# Patient Record
Sex: MALE | Race: WHITE | NOT HISPANIC OR LATINO | Employment: OTHER | URBAN - METROPOLITAN AREA
[De-identification: names, ages, dates, MRNs, and addresses within clinical notes are randomized per-mention and may not be internally consistent; named-entity substitution may affect disease eponyms.]

---

## 2017-06-12 ENCOUNTER — HISTORICAL (OUTPATIENT)
Dept: ADMINISTRATIVE | Facility: HOSPITAL | Age: 72
End: 2017-06-12

## 2017-06-12 LAB
CHOLEST SERPL-MCNC: 213 MG/DL (ref 0–200)
CHOLEST/HDLC SERPL: 3.4 {RATIO} (ref 0–5)
HDLC SERPL-MCNC: 62 MG/DL (ref 35–60)
LDLC SERPL CALC-MCNC: 143 MG/DL (ref 0–129)
PSA SERPL-MCNC: 1.09 NG/ML (ref 0–4)
TRIGL SERPL-MCNC: 42 MG/DL (ref 30–150)
VLDLC SERPL CALC-MCNC: 8 MG/DL

## 2018-05-01 ENCOUNTER — HISTORICAL (OUTPATIENT)
Dept: LAB | Facility: HOSPITAL | Age: 73
End: 2018-05-01

## 2018-05-01 LAB
ABS NEUT (OLG): 4.55 X10(3)/MCL (ref 2.1–9.2)
ALBUMIN SERPL-MCNC: 3.9 GM/DL (ref 3.4–5)
ALBUMIN/GLOB SERPL: 1.2 RATIO (ref 1.1–2)
ALP SERPL-CCNC: 89 UNIT/L (ref 50–136)
ALT SERPL-CCNC: 25 UNIT/L (ref 12–78)
APPEARANCE, UA: CLEAR
AST SERPL-CCNC: 14 UNIT/L (ref 15–37)
BACTERIA SPEC CULT: NORMAL /HPF
BASOPHILS # BLD AUTO: 0 X10(3)/MCL (ref 0–0.2)
BASOPHILS NFR BLD AUTO: 0 %
BILIRUB SERPL-MCNC: 0.8 MG/DL (ref 0.2–1)
BILIRUB UR QL STRIP: NEGATIVE
BILIRUBIN DIRECT+TOT PNL SERPL-MCNC: 0.1 MG/DL (ref 0–0.5)
BILIRUBIN DIRECT+TOT PNL SERPL-MCNC: 0.7 MG/DL (ref 0–0.8)
BUN SERPL-MCNC: 20 MG/DL (ref 7–18)
CALCIUM SERPL-MCNC: 8.7 MG/DL (ref 8.5–10.1)
CHLORIDE SERPL-SCNC: 108 MMOL/L (ref 98–107)
CHOLEST SERPL-MCNC: 228 MG/DL (ref 0–200)
CHOLEST/HDLC SERPL: 3.8 {RATIO} (ref 0–5)
CO2 SERPL-SCNC: 30 MMOL/L (ref 21–32)
COLOR UR: YELLOW
CREAT SERPL-MCNC: 0.95 MG/DL (ref 0.7–1.3)
EOSINOPHIL # BLD AUTO: 0.1 X10(3)/MCL (ref 0–0.9)
EOSINOPHIL NFR BLD AUTO: 2 %
ERYTHROCYTE [DISTWIDTH] IN BLOOD BY AUTOMATED COUNT: 13.2 % (ref 11.5–17)
GLOBULIN SER-MCNC: 3.2 GM/DL (ref 2.4–3.5)
GLUCOSE (UA): NEGATIVE
GLUCOSE SERPL-MCNC: 90 MG/DL (ref 74–106)
HCT VFR BLD AUTO: 45.2 % (ref 42–52)
HDLC SERPL-MCNC: 60 MG/DL (ref 35–60)
HGB BLD-MCNC: 14.7 GM/DL (ref 14–18)
HGB UR QL STRIP: NEGATIVE
KETONES UR QL STRIP: NEGATIVE
LDLC SERPL CALC-MCNC: 155 MG/DL (ref 0–129)
LEUKOCYTE ESTERASE UR QL STRIP: NEGATIVE
LYMPHOCYTES # BLD AUTO: 2.1 X10(3)/MCL (ref 0.6–4.6)
LYMPHOCYTES NFR BLD AUTO: 28 %
MCH RBC QN AUTO: 31.5 PG (ref 27–31)
MCHC RBC AUTO-ENTMCNC: 32.5 GM/DL (ref 33–36)
MCV RBC AUTO: 96.8 FL (ref 80–94)
MONOCYTES # BLD AUTO: 0.6 X10(3)/MCL (ref 0.1–1.3)
MONOCYTES NFR BLD AUTO: 8 %
NEUTROPHILS # BLD AUTO: 4.55 X10(3)/MCL (ref 1.4–7.9)
NEUTROPHILS NFR BLD AUTO: 61 %
NITRITE UR QL STRIP: NEGATIVE
PH UR STRIP: 6.5 [PH] (ref 5–9)
PLATELET # BLD AUTO: 228 X10(3)/MCL (ref 130–400)
PMV BLD AUTO: 9.9 FL (ref 9.4–12.4)
POTASSIUM SERPL-SCNC: 3.7 MMOL/L (ref 3.5–5.1)
PROT SERPL-MCNC: 7.1 GM/DL (ref 6.4–8.2)
PROT UR QL STRIP: NEGATIVE
PSA SERPL-MCNC: 0.68 NG/ML (ref 0–4)
RBC # BLD AUTO: 4.67 X10(6)/MCL (ref 4.7–6.1)
RBC #/AREA URNS HPF: NORMAL /[HPF]
SODIUM SERPL-SCNC: 142 MMOL/L (ref 136–145)
SP GR UR STRIP: 1.01 (ref 1–1.03)
SQUAMOUS EPITHELIAL, UA: NORMAL
TRIGL SERPL-MCNC: 63 MG/DL (ref 30–150)
UROBILINOGEN UR STRIP-ACNC: 0.2
VLDLC SERPL CALC-MCNC: 13 MG/DL
WBC # SPEC AUTO: 7.4 X10(3)/MCL (ref 4.5–11.5)
WBC #/AREA URNS HPF: NORMAL /HPF

## 2018-11-07 ENCOUNTER — HISTORICAL (OUTPATIENT)
Dept: LAB | Facility: HOSPITAL | Age: 73
End: 2018-11-07

## 2018-11-07 LAB
BUN SERPL-MCNC: 18 MG/DL (ref 7–18)
CALCIUM SERPL-MCNC: 8.8 MG/DL (ref 8.5–10.1)
CHLORIDE SERPL-SCNC: 109 MMOL/L (ref 98–107)
CO2 SERPL-SCNC: 29 MMOL/L (ref 21–32)
CREAT SERPL-MCNC: 0.84 MG/DL (ref 0.7–1.3)
CREAT/UREA NIT SERPL: 21.4
GLUCOSE SERPL-MCNC: 90 MG/DL (ref 74–106)
POTASSIUM SERPL-SCNC: 4.2 MMOL/L (ref 3.5–5.1)
SODIUM SERPL-SCNC: 142 MMOL/L (ref 136–145)

## 2018-11-08 ENCOUNTER — HISTORICAL (OUTPATIENT)
Dept: CARDIOLOGY | Facility: HOSPITAL | Age: 73
End: 2018-11-08

## 2019-05-13 ENCOUNTER — HISTORICAL (OUTPATIENT)
Dept: ADMINISTRATIVE | Facility: HOSPITAL | Age: 74
End: 2019-05-13

## 2019-05-13 LAB
ABS NEUT (OLG): 5.87 X10(3)/MCL (ref 2.1–9.2)
ALBUMIN SERPL-MCNC: 3.6 GM/DL (ref 3.4–5)
ALBUMIN/GLOB SERPL: 1.2 RATIO (ref 1.1–2)
ALP SERPL-CCNC: 89 UNIT/L (ref 50–136)
ALT SERPL-CCNC: 27 UNIT/L (ref 12–78)
APPEARANCE, UA: CLEAR
AST SERPL-CCNC: 19 UNIT/L (ref 15–37)
BACTERIA SPEC CULT: NORMAL /HPF
BASOPHILS # BLD AUTO: 0 X10(3)/MCL (ref 0–0.2)
BASOPHILS NFR BLD AUTO: 0 %
BILIRUB SERPL-MCNC: 0.5 MG/DL (ref 0.2–1)
BILIRUB UR QL STRIP: NEGATIVE
BILIRUBIN DIRECT+TOT PNL SERPL-MCNC: 0.1 MG/DL (ref 0–0.5)
BILIRUBIN DIRECT+TOT PNL SERPL-MCNC: 0.4 MG/DL (ref 0–0.8)
BUN SERPL-MCNC: 20 MG/DL (ref 7–18)
CALCIUM SERPL-MCNC: 8.9 MG/DL (ref 8.5–10.1)
CHLORIDE SERPL-SCNC: 109 MMOL/L (ref 98–107)
CHOLEST SERPL-MCNC: 197 MG/DL (ref 0–200)
CHOLEST/HDLC SERPL: 3.1 {RATIO} (ref 0–5)
CO2 SERPL-SCNC: 30 MMOL/L (ref 21–32)
COLOR UR: YELLOW
CREAT SERPL-MCNC: 0.88 MG/DL (ref 0.7–1.3)
DEPRECATED CALCIDIOL+CALCIFEROL SERPL-MC: 33.69 NG/ML (ref 30–80)
EOSINOPHIL # BLD AUTO: 0.1 X10(3)/MCL (ref 0–0.9)
EOSINOPHIL NFR BLD AUTO: 1 %
ERYTHROCYTE [DISTWIDTH] IN BLOOD BY AUTOMATED COUNT: 13.1 % (ref 11.5–17)
EST. AVERAGE GLUCOSE BLD GHB EST-MCNC: 120 MG/DL
GLOBULIN SER-MCNC: 3.1 GM/DL (ref 2.4–3.5)
GLUCOSE (UA): NEGATIVE
GLUCOSE SERPL-MCNC: 95 MG/DL (ref 74–106)
HBA1C MFR BLD: 5.8 % (ref 4.2–6.3)
HCT VFR BLD AUTO: 45.8 % (ref 42–52)
HDLC SERPL-MCNC: 64 MG/DL (ref 35–60)
HGB BLD-MCNC: 14.8 GM/DL (ref 14–18)
HGB UR QL STRIP: NEGATIVE
KETONES UR QL STRIP: NEGATIVE
LDLC SERPL CALC-MCNC: 124 MG/DL (ref 0–129)
LEUKOCYTE ESTERASE UR QL STRIP: NEGATIVE
LYMPHOCYTES # BLD AUTO: 1.2 X10(3)/MCL (ref 0.6–4.6)
LYMPHOCYTES NFR BLD AUTO: 16 %
MCH RBC QN AUTO: 30.7 PG (ref 27–31)
MCHC RBC AUTO-ENTMCNC: 32.3 GM/DL (ref 33–36)
MCV RBC AUTO: 95 FL (ref 80–94)
MONOCYTES # BLD AUTO: 0.5 X10(3)/MCL (ref 0.1–1.3)
MONOCYTES NFR BLD AUTO: 6 %
NEUTROPHILS # BLD AUTO: 5.87 X10(3)/MCL (ref 2.1–9.2)
NEUTROPHILS NFR BLD AUTO: 76 %
NITRITE UR QL STRIP: NEGATIVE
PH UR STRIP: 6.5 [PH] (ref 5–9)
PLATELET # BLD AUTO: 217 X10(3)/MCL (ref 130–400)
PMV BLD AUTO: 9.9 FL (ref 9.4–12.4)
POTASSIUM SERPL-SCNC: 4.5 MMOL/L (ref 3.5–5.1)
PROT SERPL-MCNC: 6.7 GM/DL (ref 6.4–8.2)
PROT UR QL STRIP: NEGATIVE
RBC # BLD AUTO: 4.82 X10(6)/MCL (ref 4.7–6.1)
RBC #/AREA URNS HPF: NORMAL /[HPF]
SODIUM SERPL-SCNC: 142 MMOL/L (ref 136–145)
SP GR UR STRIP: 1.01 (ref 1–1.03)
SQUAMOUS EPITHELIAL, UA: NORMAL
TRIGL SERPL-MCNC: 47 MG/DL (ref 30–150)
TSH SERPL-ACNC: 2.48 MIU/L (ref 0.36–3.74)
UROBILINOGEN UR STRIP-ACNC: 0.2
VLDLC SERPL CALC-MCNC: 9 MG/DL
WBC # SPEC AUTO: 7.7 X10(3)/MCL (ref 4.5–11.5)
WBC #/AREA URNS HPF: NORMAL /HPF

## 2019-11-14 ENCOUNTER — HISTORICAL (OUTPATIENT)
Dept: ADMINISTRATIVE | Facility: HOSPITAL | Age: 74
End: 2019-11-14

## 2019-11-14 LAB
BUN SERPL-MCNC: 23 MG/DL (ref 7–18)
CALCIUM SERPL-MCNC: 8.5 MG/DL (ref 8.5–10.1)
CHLORIDE SERPL-SCNC: 110 MMOL/L (ref 98–107)
CHOLEST SERPL-MCNC: 209 MG/DL (ref 0–200)
CHOLEST/HDLC SERPL: 3.5 {RATIO} (ref 0–5)
CO2 SERPL-SCNC: 30 MMOL/L (ref 21–32)
CREAT SERPL-MCNC: 0.9 MG/DL (ref 0.7–1.3)
CREAT/UREA NIT SERPL: 25.6
GLUCOSE SERPL-MCNC: 102 MG/DL (ref 74–106)
HDLC SERPL-MCNC: 60 MG/DL (ref 35–60)
LDLC SERPL CALC-MCNC: 140 MG/DL (ref 0–129)
POTASSIUM SERPL-SCNC: 3.9 MMOL/L (ref 3.5–5.1)
SODIUM SERPL-SCNC: 143 MMOL/L (ref 136–145)
TRIGL SERPL-MCNC: 45 MG/DL (ref 30–150)
VLDLC SERPL CALC-MCNC: 9 MG/DL

## 2020-07-16 ENCOUNTER — HISTORICAL (OUTPATIENT)
Dept: ADMINISTRATIVE | Facility: HOSPITAL | Age: 75
End: 2020-07-16

## 2020-07-16 LAB
ABS NEUT (OLG): 4.69 X10(3)/MCL (ref 2.1–9.2)
ALBUMIN SERPL-MCNC: 4 GM/DL (ref 3.4–4.8)
ALBUMIN/GLOB SERPL: 1.7 RATIO (ref 1.1–2)
ALP SERPL-CCNC: 76 UNIT/L (ref 40–150)
ALT SERPL-CCNC: 20 UNIT/L (ref 0–55)
APPEARANCE, UA: CLEAR
AST SERPL-CCNC: 21 UNIT/L (ref 5–34)
BACTERIA SPEC CULT: NORMAL /HPF
BASOPHILS # BLD AUTO: 0 X10(3)/MCL (ref 0–0.2)
BASOPHILS NFR BLD AUTO: 0 %
BILIRUB SERPL-MCNC: 0.6 MG/DL
BILIRUB UR QL STRIP: NEGATIVE
BILIRUBIN DIRECT+TOT PNL SERPL-MCNC: 0.2 MG/DL (ref 0–0.5)
BILIRUBIN DIRECT+TOT PNL SERPL-MCNC: 0.4 MG/DL (ref 0–0.8)
BUN SERPL-MCNC: 24.8 MG/DL (ref 8.4–25.7)
CALCIUM SERPL-MCNC: 8.9 MG/DL (ref 8.8–10)
CHLORIDE SERPL-SCNC: 106 MMOL/L (ref 98–107)
CHOLEST SERPL-MCNC: 218 MG/DL
CHOLEST/HDLC SERPL: 4 {RATIO} (ref 0–5)
CO2 SERPL-SCNC: 31 MMOL/L (ref 23–31)
COLOR UR: YELLOW
CREAT SERPL-MCNC: 0.85 MG/DL (ref 0.73–1.18)
DEPRECATED CALCIDIOL+CALCIFEROL SERPL-MC: 37.8 NG/ML (ref 6.6–49.9)
EOSINOPHIL # BLD AUTO: 0.1 X10(3)/MCL (ref 0–0.9)
EOSINOPHIL NFR BLD AUTO: 2 %
ERYTHROCYTE [DISTWIDTH] IN BLOOD BY AUTOMATED COUNT: 13.4 % (ref 11.5–17)
EST. AVERAGE GLUCOSE BLD GHB EST-MCNC: 105.4 MG/DL
GLOBULIN SER-MCNC: 2.4 GM/DL (ref 2.4–3.5)
GLUCOSE (UA): NEGATIVE
GLUCOSE SERPL-MCNC: 90 MG/DL (ref 82–115)
HBA1C MFR BLD: 5.3 %
HCT VFR BLD AUTO: 43.6 % (ref 42–52)
HDLC SERPL-MCNC: 56 MG/DL (ref 35–60)
HGB BLD-MCNC: 14.1 GM/DL (ref 14–18)
HGB UR QL STRIP: NEGATIVE
KETONES UR QL STRIP: NEGATIVE
LDLC SERPL CALC-MCNC: 152 MG/DL (ref 50–140)
LEUKOCYTE ESTERASE UR QL STRIP: NEGATIVE
LYMPHOCYTES # BLD AUTO: 1.2 X10(3)/MCL (ref 0.6–4.6)
LYMPHOCYTES NFR BLD AUTO: 18 %
MCH RBC QN AUTO: 30.9 PG (ref 27–31)
MCHC RBC AUTO-ENTMCNC: 32.3 GM/DL (ref 33–36)
MCV RBC AUTO: 95.4 FL (ref 80–94)
MONOCYTES # BLD AUTO: 0.5 X10(3)/MCL (ref 0.1–1.3)
MONOCYTES NFR BLD AUTO: 8 %
NEUTROPHILS # BLD AUTO: 4.69 X10(3)/MCL (ref 2.1–9.2)
NEUTROPHILS NFR BLD AUTO: 72 %
NITRITE UR QL STRIP: NEGATIVE
PH UR STRIP: 6.5 [PH] (ref 5–9)
PLATELET # BLD AUTO: 229 X10(3)/MCL (ref 130–400)
PMV BLD AUTO: 9.8 FL (ref 9.4–12.4)
POTASSIUM SERPL-SCNC: 4.6 MMOL/L (ref 3.5–5.1)
PROT SERPL-MCNC: 6.4 GM/DL (ref 5.8–7.6)
PROT UR QL STRIP: NEGATIVE
PSA SERPL-MCNC: 0.64 NG/ML
RBC # BLD AUTO: 4.57 X10(6)/MCL (ref 4.7–6.1)
RBC #/AREA URNS HPF: NORMAL /[HPF]
SODIUM SERPL-SCNC: 143 MMOL/L (ref 136–145)
SP GR UR STRIP: 1.01 (ref 1–1.03)
SQUAMOUS EPITHELIAL, UA: NORMAL
TRIGL SERPL-MCNC: 51 MG/DL (ref 34–140)
TSH SERPL-ACNC: 2.3 UIU/ML (ref 0.35–4.94)
UROBILINOGEN UR STRIP-ACNC: 0.2
VLDLC SERPL CALC-MCNC: 10 MG/DL
WBC # SPEC AUTO: 6.5 X10(3)/MCL (ref 4.5–11.5)
WBC #/AREA URNS HPF: NORMAL /HPF

## 2021-01-21 ENCOUNTER — HISTORICAL (OUTPATIENT)
Dept: ADMINISTRATIVE | Facility: HOSPITAL | Age: 76
End: 2021-01-21

## 2021-01-21 LAB
ALBUMIN SERPL-MCNC: 3.9 GM/DL (ref 3.4–4.8)
ALP SERPL-CCNC: 92 UNIT/L (ref 40–150)
ALT SERPL-CCNC: 18 UNIT/L (ref 0–55)
AST SERPL-CCNC: 19 UNIT/L (ref 5–34)
BILIRUB SERPL-MCNC: 0.4 MG/DL
BILIRUBIN DIRECT+TOT PNL SERPL-MCNC: 0.2 MG/DL (ref 0–0.5)
BILIRUBIN DIRECT+TOT PNL SERPL-MCNC: 0.2 MG/DL (ref 0–0.8)
BUN SERPL-MCNC: 27.5 MG/DL (ref 8.4–25.7)
CALCIUM SERPL-MCNC: 8.7 MG/DL (ref 8.8–10)
CHLORIDE SERPL-SCNC: 107 MMOL/L (ref 98–107)
CHOLEST SERPL-MCNC: 181 MG/DL
CHOLEST/HDLC SERPL: 3 {RATIO} (ref 0–5)
CO2 SERPL-SCNC: 28 MMOL/L (ref 23–31)
CREAT SERPL-MCNC: 0.87 MG/DL (ref 0.73–1.18)
CREAT/UREA NIT SERPL: 32
GLUCOSE SERPL-MCNC: 103 MG/DL (ref 82–115)
HDLC SERPL-MCNC: 53 MG/DL (ref 35–60)
LDLC SERPL CALC-MCNC: 123 MG/DL (ref 50–140)
LIVER PROFILE INTERP: NORMAL
POTASSIUM SERPL-SCNC: 4.8 MMOL/L (ref 3.5–5.1)
PROT SERPL-MCNC: 6.4 GM/DL (ref 5.8–7.6)
SODIUM SERPL-SCNC: 144 MMOL/L (ref 136–145)
TRIGL SERPL-MCNC: 27 MG/DL (ref 34–140)
VLDLC SERPL CALC-MCNC: 5 MG/DL

## 2021-04-30 ENCOUNTER — HISTORICAL (OUTPATIENT)
Dept: ADMINISTRATIVE | Facility: HOSPITAL | Age: 76
End: 2021-04-30

## 2021-04-30 LAB
ABS NEUT (OLG): 4.78 X10(3)/MCL (ref 2.1–9.2)
BASOPHILS # BLD AUTO: 0 X10(3)/MCL (ref 0–0.2)
BASOPHILS NFR BLD AUTO: 1 %
EOSINOPHIL # BLD AUTO: 0.1 X10(3)/MCL (ref 0–0.9)
EOSINOPHIL NFR BLD AUTO: 1 %
ERYTHROCYTE [DISTWIDTH] IN BLOOD BY AUTOMATED COUNT: 13.9 % (ref 11.5–17)
HCT VFR BLD AUTO: 43.5 % (ref 42–52)
HGB BLD-MCNC: 13.9 GM/DL (ref 14–18)
LYMPHOCYTES # BLD AUTO: 1.4 X10(3)/MCL (ref 0.6–4.6)
LYMPHOCYTES NFR BLD AUTO: 21 %
MCH RBC QN AUTO: 30.6 PG (ref 27–31)
MCHC RBC AUTO-ENTMCNC: 32 GM/DL (ref 33–36)
MCV RBC AUTO: 95.8 FL (ref 80–94)
MONOCYTES # BLD AUTO: 0.5 X10(3)/MCL (ref 0.1–1.3)
MONOCYTES NFR BLD AUTO: 7 %
NEUTROPHILS # BLD AUTO: 4.78 X10(3)/MCL (ref 2.1–9.2)
NEUTROPHILS NFR BLD AUTO: 70 %
PLATELET # BLD AUTO: 297 X10(3)/MCL (ref 130–400)
PMV BLD AUTO: 10 FL (ref 9.4–12.4)
RBC # BLD AUTO: 4.54 X10(6)/MCL (ref 4.7–6.1)
WBC # SPEC AUTO: 6.9 X10(3)/MCL (ref 4.5–11.5)

## 2022-02-08 ENCOUNTER — HISTORICAL (OUTPATIENT)
Dept: ADMINISTRATIVE | Facility: HOSPITAL | Age: 77
End: 2022-02-08

## 2022-02-08 LAB
ABS NEUT (OLG): 6.25 (ref 2.1–9.2)
ALBUMIN SERPL-MCNC: 3.9 G/DL (ref 3.4–4.8)
ALBUMIN/GLOB SERPL: 1.3 {RATIO} (ref 1.1–2)
ALP SERPL-CCNC: 98 U/L (ref 40–150)
ALT SERPL-CCNC: 30 U/L (ref 0–55)
APPEARANCE, UA: CLEAR
APTT PPP: 31.1 S (ref 23.2–33.7)
AST SERPL-CCNC: 27 U/L (ref 5–34)
BACTERIA SPEC CULT: NORMAL
BASOPHILS # BLD AUTO: 0 10*3/UL (ref 0–0.2)
BASOPHILS NFR BLD AUTO: 0 %
BILIRUB SERPL-MCNC: 0.6 MG/DL
BILIRUB UR QL STRIP: NORMAL
BILIRUBIN DIRECT+TOT PNL SERPL-MCNC: 0.3 (ref 0–0.5)
BILIRUBIN DIRECT+TOT PNL SERPL-MCNC: 0.3 (ref 0–0.8)
BUDDING YEAST: NORMAL
BUN SERPL-MCNC: 28.2 MG/DL (ref 8.4–25.7)
CALCIUM SERPL-MCNC: 9.5 MG/DL (ref 8.7–10.5)
CASTS, UA: 14
CHLORIDE SERPL-SCNC: 107 MMOL/L (ref 98–107)
CO2 SERPL-SCNC: 29 MMOL/L (ref 23–31)
COLOR UR: NORMAL
CREAT SERPL-MCNC: 1.25 MG/DL (ref 0.73–1.18)
CRYSTALS: NORMAL
EOSINOPHIL # BLD AUTO: 0 10*3/UL (ref 0–0.9)
EOSINOPHIL NFR BLD AUTO: 0 %
ERYTHROCYTE [DISTWIDTH] IN BLOOD BY AUTOMATED COUNT: 13.8 % (ref 11.5–17)
GLOBULIN SER-MCNC: 3 G/DL (ref 2.4–3.5)
GLUCOSE (UA): NEGATIVE
GLUCOSE SERPL-MCNC: 96 MG/DL (ref 82–115)
HCT VFR BLD AUTO: 45.3 % (ref 42–52)
HEMOLYSIS INTERF INDEX SERPL-ACNC: <0
HGB BLD-MCNC: 14.6 G/DL (ref 14–18)
HGB UR QL STRIP: NEGATIVE
HYALINE CASTS #/AREA URNS LPF: NORMAL /[LPF]
ICTERIC INTERF INDEX SERPL-ACNC: 1
INR PPP: 1 (ref 0–1.3)
KETONES UR QL STRIP: NORMAL
LEUKOCYTE ESTERASE UR QL STRIP: NORMAL
LIPEMIC INTERF INDEX SERPL-ACNC: 19
LYMPHOCYTES # BLD AUTO: 2 10*3/UL (ref 0.6–4.6)
LYMPHOCYTES NFR BLD AUTO: 22 %
MANUAL DIFF? (OHS): NO
MCH RBC QN AUTO: 30.9 PG (ref 27–31)
MCHC RBC AUTO-ENTMCNC: 32.2 G/DL (ref 33–36)
MCV RBC AUTO: 96 FL (ref 80–94)
MONOCYTES # BLD AUTO: 0.6 10*3/UL (ref 0.1–1.3)
MONOCYTES NFR BLD AUTO: 7 %
MUCOUS THREADS URNS QL MICRO: NORMAL
NEUTROPHILS # BLD AUTO: 6.25 10*3/UL (ref 2.1–9.2)
NEUTROPHILS NFR BLD AUTO: 70 %
NITRITE UR QL STRIP: NEGATIVE
PH UR STRIP: 5 [PH] (ref 5–9)
PLATELET # BLD AUTO: 256 10*3/UL (ref 130–400)
PMV BLD AUTO: 9.8 FL (ref 9.4–12.4)
POTASSIUM SERPL-SCNC: 4.3 MMOL/L (ref 3.5–5.1)
PROT SERPL-MCNC: 6.9 G/DL (ref 5.8–7.6)
PROT UR QL STRIP: NORMAL
PROTHROMBIN TIME: 13.4 S (ref 12.5–14.5)
RBC # BLD AUTO: 4.72 10*6/UL (ref 4.7–6.1)
RBC #/AREA URNS HPF: NORMAL /[HPF] (ref 0–2)
SMALL ROUND CELLS, UA: NORMAL
SODIUM SERPL-SCNC: 146 MMOL/L (ref 136–145)
SP GR UR STRIP: 1.02 (ref 1–1.03)
SPERM URNS QL MICRO: NORMAL
SQUAMOUS EPITHELIAL, UA: NORMAL (ref 0–4)
UROBILINOGEN UR STRIP-ACNC: 1
WBC # SPEC AUTO: 9 10*3/UL (ref 4.5–11.5)
WBC #/AREA URNS HPF: NORMAL /[HPF] (ref 0–2)

## 2022-04-11 ENCOUNTER — HISTORICAL (OUTPATIENT)
Dept: ADMINISTRATIVE | Facility: HOSPITAL | Age: 77
End: 2022-04-11
Payer: MEDICARE

## 2022-04-29 VITALS
DIASTOLIC BLOOD PRESSURE: 71 MMHG | SYSTOLIC BLOOD PRESSURE: 123 MMHG | OXYGEN SATURATION: 96 % | BODY MASS INDEX: 26.4 KG/M2 | HEIGHT: 68 IN | WEIGHT: 174.19 LBS

## 2022-06-29 ENCOUNTER — OFFICE VISIT (OUTPATIENT)
Dept: INTERNAL MEDICINE | Facility: CLINIC | Age: 77
End: 2022-06-29
Payer: MEDICARE

## 2022-06-29 VITALS
HEART RATE: 60 BPM | SYSTOLIC BLOOD PRESSURE: 144 MMHG | BODY MASS INDEX: 28.44 KG/M2 | DIASTOLIC BLOOD PRESSURE: 86 MMHG | TEMPERATURE: 98 F | RESPIRATION RATE: 16 BRPM | HEIGHT: 68 IN | OXYGEN SATURATION: 99 % | WEIGHT: 187.63 LBS

## 2022-06-29 DIAGNOSIS — G57.02 PIRIFORMIS SYNDROME OF LEFT SIDE: ICD-10-CM

## 2022-06-29 PROCEDURE — 96372 PR INJECTION,THERAP/PROPH/DIAG2ST, IM OR SUBCUT: ICD-10-PCS | Mod: ,,, | Performed by: INTERNAL MEDICINE

## 2022-06-29 PROCEDURE — 96372 THER/PROPH/DIAG INJ SC/IM: CPT | Mod: ,,, | Performed by: INTERNAL MEDICINE

## 2022-06-29 PROCEDURE — 99214 PR OFFICE/OUTPT VISIT, EST, LEVL IV, 30-39 MIN: ICD-10-PCS | Mod: 25,,, | Performed by: INTERNAL MEDICINE

## 2022-06-29 PROCEDURE — 99214 OFFICE O/P EST MOD 30 MIN: CPT | Mod: 25,,, | Performed by: INTERNAL MEDICINE

## 2022-06-29 RX ORDER — AMLODIPINE AND BENAZEPRIL HYDROCHLORIDE 10; 40 MG/1; MG/1
1 CAPSULE ORAL DAILY
COMMUNITY
Start: 2022-06-24 | End: 2022-09-29

## 2022-06-29 RX ORDER — HYDROXYCHLOROQUINE SULFATE 200 MG/1
200 TABLET, FILM COATED ORAL 2 TIMES DAILY
COMMUNITY
Start: 2022-06-09

## 2022-06-29 RX ORDER — KETOROLAC TROMETHAMINE 30 MG/ML
30 INJECTION, SOLUTION INTRAMUSCULAR; INTRAVENOUS
Status: COMPLETED | OUTPATIENT
Start: 2022-06-29 | End: 2022-06-29

## 2022-06-29 RX ORDER — PREDNISONE 5 MG/1
5 TABLET ORAL DAILY
Status: ON HOLD | COMMUNITY
Start: 2022-06-03 | End: 2023-02-24

## 2022-06-29 RX ORDER — ZINC GLUCONATE 50 MG
50 TABLET ORAL DAILY
COMMUNITY

## 2022-06-29 RX ORDER — CHOLECALCIFEROL (VITAMIN D3) 25 MCG
1000 TABLET ORAL DAILY
COMMUNITY

## 2022-06-29 RX ORDER — MELOXICAM 15 MG/1
15 TABLET ORAL DAILY
Status: ON HOLD | COMMUNITY
Start: 2022-06-03 | End: 2023-02-24

## 2022-06-29 RX ORDER — MONTELUKAST SODIUM 4 MG/1
1 TABLET, CHEWABLE ORAL 2 TIMES DAILY
COMMUNITY
Start: 2022-04-27 | End: 2022-09-29

## 2022-06-29 RX ORDER — CYCLOBENZAPRINE HCL 10 MG
10 TABLET ORAL 3 TIMES DAILY PRN
Qty: 30 TABLET | Refills: 0 | Status: SHIPPED | OUTPATIENT
Start: 2022-06-29 | End: 2022-07-11

## 2022-06-29 RX ORDER — ASPIRIN 81 MG/1
81 TABLET ORAL DAILY
Status: ON HOLD | COMMUNITY
End: 2023-02-24

## 2022-06-29 RX ORDER — TAMSULOSIN HYDROCHLORIDE 0.4 MG/1
1 CAPSULE ORAL 2 TIMES DAILY
COMMUNITY
Start: 2022-05-05 | End: 2022-09-01

## 2022-06-29 RX ORDER — DEXAMETHASONE SODIUM PHOSPHATE 4 MG/ML
4 INJECTION, SOLUTION INTRA-ARTICULAR; INTRALESIONAL; INTRAMUSCULAR; INTRAVENOUS; SOFT TISSUE
Status: COMPLETED | OUTPATIENT
Start: 2022-06-29 | End: 2022-06-29

## 2022-06-29 RX ADMIN — KETOROLAC TROMETHAMINE 30 MG: 30 INJECTION, SOLUTION INTRAMUSCULAR; INTRAVENOUS at 02:06

## 2022-06-29 RX ADMIN — DEXAMETHASONE SODIUM PHOSPHATE 4 MG: 4 INJECTION, SOLUTION INTRA-ARTICULAR; INTRALESIONAL; INTRAMUSCULAR; INTRAVENOUS; SOFT TISSUE at 02:06

## 2022-06-29 NOTE — PROGRESS NOTES
Subjective:      Patient ID: Deshaun Lang is a 76 y.o. male.    Chief Complaint: Hip Pain (Left hip pain since Sunday 6/26/22)      HPI:  76-year-old  male here today with complaints of left buttock/piriformis discomfort present since Sotero morning he was working on 1 of his port scars on Saturdays and lifted up on a convertible top that was extremely heavy he denies hearing any pop had no pain at that time but over the course of several hours began having pain in the left buttock and paresthesias down in the left leg.  He also reports somewhat not as strong urinary stream but reports that he is not incontinent of urine and has full control of his bowels as well.  Straight leg raise is negative tenderness noted in the sciatic notch in the area of the piriformis on the left;  normal external range of motion of the left hip        Problem List Items Addressed This Visit        Neuro    Piriformis syndrome of left side              Past Medical History:  Past Medical History:   Diagnosis Date    Acute renal failure syndrome     Carpal tunnel syndrome     Cataracts, bilateral     HLD (hyperlipidemia)     HTN (hypertension)     Unspecified osteoarthritis, unspecified site      Past Surgical History:   Procedure Laterality Date    CARPAL TUNNEL RELEASE      CATARACT EXTRACTION      NEUROPLASTY  03/22/2012    TOTAL KNEE ARTHROPLASTY  08/22/2017    TRANSURETHRAL RESECTION OF PROSTATE  08/30/2017     Review of patient's allergies indicates:   Allergen Reactions    Penicillins Other (See Comments)     Unknown what type of reaction      Statins-hmg-coa reductase inhibitors Other (See Comments)     Unknown what type of reaction     Current Outpatient Medications on File Prior to Visit   Medication Sig Dispense Refill    amLODIPine-benazepriL (LOTREL) 10-40 mg per capsule Take 1 capsule by mouth Daily.      apixaban (ELIQUIS) 5 mg Tab Take 5 mg by mouth 2 (two) times a day.      aspirin  (ECOTRIN) 81 MG EC tablet Take 81 mg by mouth Daily.      colestipoL (COLESTID) 1 gram Tab Take 1 tablet by mouth 2 (two) times a day. With a full glass of water      eszopiclone (LUNESTA) 2 MG Tab TAKE ONE TABLET AT BEDTIME AS NEEDED FOR insomnia 30 tablet 0    hydrOXYchloroQUINE (PLAQUENIL) 200 mg tablet Take 200 mg by mouth 2 (two) times daily.      meloxicam (MOBIC) 15 MG tablet Take 15 mg by mouth once daily.      predniSONE (DELTASONE) 5 MG tablet Take 5 mg by mouth once daily.      tamsulosin (FLOMAX) 0.4 mg Cap Take 1 capsule by mouth 2 (two) times daily.      traMADoL (ULTRAM) 50 mg tablet TAKE ONE TABLET EVERY 6 HOURS. AS NEEDED FOR PAIN. MUST LAST 30 DAYS 60 tablet 0    vitamin D (VITAMIN D3) 1000 units Tab Take 1,000 Units by mouth once daily.      zinc gluconate 50 mg tablet Take 50 mg by mouth once daily.       No current facility-administered medications on file prior to visit.     Social History     Socioeconomic History    Marital status:    Tobacco Use    Smoking status: Never Smoker    Smokeless tobacco: Never Used   Substance and Sexual Activity    Alcohol use: Not Currently    Drug use: Never    Sexual activity: Not Currently     History reviewed. No pertinent family history.        Review of Systems  Constitutional: No fever,  no fatigue, no chills, no night sweats, no weight gain, no weight loss, no changes in appetite.   Eye: No redness, no acute vision loss, no blurred vision, no double vision, no eye pain  ENMT: No sore throat, no nasal drainage, no nose bleeds,  no headache, no ear pain, no ear drainage, no acute hearing loss  Respiratory: No cough, no sputum production, no shortness of breath, no hemoptysis, no wheezing.  Cardiovascular: No chest pain, no chest tightness, no [place], no PND, no orthopnea, no swelling, no palpitations.  Gastrointestinal: No abdominal pain, no nausea, no vomiting, no diarrhea, no constipation, no difficulty swallowing, no change in bowel  "habits, no rectal bleeding  Genitourinary: no urgency, no frequency, no burning or pain when urinating, no blood in urine, no incontinence  Heme/Lymph: No easy bruising and/or bleeding, no swollen or painful glands.  Endocrine: No polyuria, no polydipsia, no polyphagia, no heat or cold intolerance.  Musculoskeletal: + muscle pain, no muscle weakness, no joint pain, no red or swollen joints.  Integumentary: No rash, no pruritis, no hair or nail changes.  Neurologic: No dizziness, no fainting, no tremors, + tingling and/ or numbness.  Psychiatric: No anxiety, no depression, no memory loss  All Other ROS: Negative with exception of what is documented in the history of present illness     Objective:   BP (!) 144/86 (BP Location: Left arm, Patient Position: Sitting, BP Method: Medium (Manual))   Pulse 60   Temp 97.5 °F (36.4 °C) (Temporal)   Resp 16   Ht 5' 8" (1.727 m)   Wt 85.1 kg (187 lb 9.6 oz)   SpO2 99%   BMI 28.52 kg/m²     Physical Exam  General : Alert and oriented, No acute distress, well, developed, well nourished, afebrile   Eye : PERRLA. EOMI.   Musculoskeletal :  Tenderness in the left sciatic notch, straight leg raise negative, normal external range of motion of the left hip    Neurologic : Alert and oriented  Psychiatric : Cooperative, Appropriate mood & affect. Normal judgment.          Assessment:     1. Piriformis syndrome of left side          Plan:       I am having Luis Armando Lang start on cyclobenzaprine. I am also having him maintain his traMADoL, eszopiclone, amLODIPine-benazepriL, colestipoL, meloxicam, predniSONE, tamsulosin, hydrOXYchloroQUINE, vitamin D, zinc gluconate, apixaban, and aspirin. We will continue to administer dexamethasone and ketorolac.      Problem List Items Addressed This Visit        Neuro    Piriformis syndrome of left side            Deshaun was seen today for hip pain.    Diagnoses and all orders for this visit:    Piriformis syndrome of left side    Other " orders  -     dexamethasone injection 4 mg  -     ketorolac injection 30 mg  -     cyclobenzaprine (FLEXERIL) 10 MG tablet; Take 1 tablet (10 mg total) by mouth 3 (three) times daily as needed for Muscle spasms.            Medications Ordered This Encounter   Medications    cyclobenzaprine (FLEXERIL) 10 MG tablet     Sig: Take 1 tablet (10 mg total) by mouth 3 (three) times daily as needed for Muscle spasms.     Dispense:  30 tablet     Refill:  0    dexamethasone injection 4 mg    ketorolac injection 30 mg     [unfilled]  No orders of the defined types were placed in this encounter.      Medication List with Changes/Refills   New Medications    CYCLOBENZAPRINE (FLEXERIL) 10 MG TABLET    Take 1 tablet (10 mg total) by mouth 3 (three) times daily as needed for Muscle spasms.   Current Medications    AMLODIPINE-BENAZEPRIL (LOTREL) 10-40 MG PER CAPSULE    Take 1 capsule by mouth Daily.    APIXABAN (ELIQUIS) 5 MG TAB    Take 5 mg by mouth 2 (two) times a day.    ASPIRIN (ECOTRIN) 81 MG EC TABLET    Take 81 mg by mouth Daily.    COLESTIPOL (COLESTID) 1 GRAM TAB    Take 1 tablet by mouth 2 (two) times a day. With a full glass of water    ESZOPICLONE (LUNESTA) 2 MG TAB    TAKE ONE TABLET AT BEDTIME AS NEEDED FOR insomnia    HYDROXYCHLOROQUINE (PLAQUENIL) 200 MG TABLET    Take 200 mg by mouth 2 (two) times daily.    MELOXICAM (MOBIC) 15 MG TABLET    Take 15 mg by mouth once daily.    PREDNISONE (DELTASONE) 5 MG TABLET    Take 5 mg by mouth once daily.    TAMSULOSIN (FLOMAX) 0.4 MG CAP    Take 1 capsule by mouth 2 (two) times daily.    TRAMADOL (ULTRAM) 50 MG TABLET    TAKE ONE TABLET EVERY 6 HOURS. AS NEEDED FOR PAIN. MUST LAST 30 DAYS    VITAMIN D (VITAMIN D3) 1000 UNITS TAB    Take 1,000 Units by mouth once daily.    ZINC GLUCONATE 50 MG TABLET    Take 50 mg by mouth once daily.      Medication List with Changes/Refills   New Medications    CYCLOBENZAPRINE (FLEXERIL) 10 MG TABLET    Take 1 tablet (10 mg total) by mouth  3 (three) times daily as needed for Muscle spasms.       Start Date: 6/29/2022 End Date: 7/9/2022   Current Medications    AMLODIPINE-BENAZEPRIL (LOTREL) 10-40 MG PER CAPSULE    Take 1 capsule by mouth Daily.       Start Date: 6/24/2022 End Date: --    APIXABAN (ELIQUIS) 5 MG TAB    Take 5 mg by mouth 2 (two) times a day.       Start Date: 2/8/2022  End Date: --    ASPIRIN (ECOTRIN) 81 MG EC TABLET    Take 81 mg by mouth Daily.       Start Date: --        End Date: --    COLESTIPOL (COLESTID) 1 GRAM TAB    Take 1 tablet by mouth 2 (two) times a day. With a full glass of water       Start Date: 4/27/2022 End Date: --    ESZOPICLONE (LUNESTA) 2 MG TAB    TAKE ONE TABLET AT BEDTIME AS NEEDED FOR insomnia       Start Date: 6/9/2022  End Date: --    HYDROXYCHLOROQUINE (PLAQUENIL) 200 MG TABLET    Take 200 mg by mouth 2 (two) times daily.       Start Date: 6/9/2022  End Date: --    MELOXICAM (MOBIC) 15 MG TABLET    Take 15 mg by mouth once daily.       Start Date: 6/3/2022  End Date: --    PREDNISONE (DELTASONE) 5 MG TABLET    Take 5 mg by mouth once daily.       Start Date: 6/3/2022  End Date: --    TAMSULOSIN (FLOMAX) 0.4 MG CAP    Take 1 capsule by mouth 2 (two) times daily.       Start Date: 5/5/2022  End Date: --    TRAMADOL (ULTRAM) 50 MG TABLET    TAKE ONE TABLET EVERY 6 HOURS. AS NEEDED FOR PAIN. MUST LAST 30 DAYS       Start Date: 6/3/2022  End Date: --    VITAMIN D (VITAMIN D3) 1000 UNITS TAB    Take 1,000 Units by mouth once daily.       Start Date: --        End Date: --    ZINC GLUCONATE 50 MG TABLET    Take 50 mg by mouth once daily.       Start Date: --        End Date: --            Follow up if symptoms worsen or fail to improve.

## 2022-06-29 NOTE — ASSESSMENT & PLAN NOTE
Toradol x1, Decadron x1  Patient on chronic Mobic, Flexeril sent to pharmacy  If no improvement over the next several days will consider imaging of the lumbar and sacrum

## 2022-07-05 ENCOUNTER — TELEPHONE (OUTPATIENT)
Dept: INTERNAL MEDICINE | Facility: CLINIC | Age: 77
End: 2022-07-05
Payer: MEDICARE

## 2022-07-05 DIAGNOSIS — M54.9 DORSALGIA, UNSPECIFIED: ICD-10-CM

## 2022-07-05 DIAGNOSIS — G54.1 LUMBOSACRAL PLEXUS DISORDERS: ICD-10-CM

## 2022-07-05 NOTE — TELEPHONE ENCOUNTER
----- Message from Letty Rowe sent at 7/5/2022 10:06 AM CDT -----  Pt stated his hip pain is getting worse and the medication is not working .  Pt would like to get the MRI done

## 2022-07-05 NOTE — TELEPHONE ENCOUNTER
Is it possible for him to go to the ER at the orthopedic [place] for us to get his MRI done today please; the 8th is just too long

## 2022-07-05 NOTE — TELEPHONE ENCOUNTER
----- Message from Lenard Miranda MD sent at 7/5/2022 10:45 AM CDT -----  Mri orders placed    ----- Message -----  From: Letty Rowe  Sent: 7/5/2022  10:08 AM CDT  To: Lenard Miranda MD    Pt stated his hip pain is getting worse and the medication is not working .  Pt would like to get the MRI done

## 2022-07-06 ENCOUNTER — TELEPHONE (OUTPATIENT)
Dept: ADMINISTRATIVE | Facility: HOSPITAL | Age: 77
End: 2022-07-06
Payer: MEDICARE

## 2022-07-06 NOTE — TELEPHONE ENCOUNTER
Type:  Patient Returning Call    Who Called:self  Who Left Message for Patient:kaz  Does the patient know what this is regarding?:no  Would the patient rather a call back or a response via [facility]? Call back  Best Call Back Number:[phone]  Additional Information:

## 2022-07-07 ENCOUNTER — HOSPITAL ENCOUNTER (EMERGENCY)
Facility: HOSPITAL | Age: 77
Discharge: HOME | End: 2022-07-07
Attending: FAMILY MEDICINE
Payer: MEDICARE

## 2022-07-07 VITALS
TEMPERATURE: 98 F | WEIGHT: 189 LBS | HEART RATE: 91 BPM | RESPIRATION RATE: 18 BRPM | SYSTOLIC BLOOD PRESSURE: 114 MMHG | OXYGEN SATURATION: 97 % | DIASTOLIC BLOOD PRESSURE: 68 MMHG | BODY MASS INDEX: 28.64 KG/M2 | HEIGHT: 68 IN

## 2022-07-07 DIAGNOSIS — M54.32 LEFT SIDED SCIATICA: Primary | ICD-10-CM

## 2022-07-07 PROCEDURE — 63600175 PHARM REV CODE 636 W HCPCS: Performed by: FAMILY MEDICINE

## 2022-07-07 PROCEDURE — 96372 THER/PROPH/DIAG INJ SC/IM: CPT | Performed by: FAMILY MEDICINE

## 2022-07-07 PROCEDURE — 99284 EMERGENCY DEPT VISIT MOD MDM: CPT

## 2022-07-07 RX ORDER — DEXAMETHASONE SODIUM PHOSPHATE 4 MG/ML
8 INJECTION, SOLUTION INTRA-ARTICULAR; INTRALESIONAL; INTRAMUSCULAR; INTRAVENOUS; SOFT TISSUE
Status: COMPLETED | OUTPATIENT
Start: 2022-07-07 | End: 2022-07-07

## 2022-07-07 RX ADMIN — DEXAMETHASONE SODIUM PHOSPHATE 8 MG: 4 INJECTION, SOLUTION INTRA-ARTICULAR; INTRALESIONAL; INTRAMUSCULAR; INTRAVENOUS; SOFT TISSUE at 10:07

## 2022-07-07 NOTE — ED PROVIDER NOTES
Encounter Date: 7/7/2022       History     Chief Complaint   Patient presents with    Back Pain     Pt c/o pain to back, left hip radiating down to left knee. States feels like his sciatic nerve.     76-year-old male presents to the ED with complaint of back pain.  Onset 1 week ago. Pt reports he was working on his car and the next day started having back pain. Pt reports he went to his PCP who gave him a shot and a muscle relaxer.  Pt reports the pain got so bad so he called PCP today who tole him to come to the ER to get an MRI.  Patient reports back pain radiates down to left leg, feels like my sciatica nerve again .  Patient denies lower extremity weakness, bowel or bladder incontinence, saddle numbness, urinary symptoms, chest pain, shortness of breath, headache, fever, chills.    The history is provided by the patient. No  was used.     Review of patient's allergies indicates:   Allergen Reactions    Penicillins Other (See Comments)     Unknown what type of reaction      Statins-hmg-coa reductase inhibitors Other (See Comments)     Unknown what type of reaction     Past Medical History:   Diagnosis Date    Acute renal failure syndrome     Carpal tunnel syndrome     Cataracts, bilateral     HLD (hyperlipidemia)     HTN (hypertension)     Unspecified osteoarthritis, unspecified site      Past Surgical History:   Procedure Laterality Date    CARPAL TUNNEL RELEASE      CATARACT EXTRACTION      NEUROPLASTY  03/22/2012    TOTAL KNEE ARTHROPLASTY  08/22/2017    TRANSURETHRAL RESECTION OF PROSTATE  08/30/2017     No family history on file.  Social History     Tobacco Use    Smoking status: Never Smoker    Smokeless tobacco: Never Used   Substance Use Topics    Alcohol use: Not Currently    Drug use: Never     Review of Systems   Constitutional: Negative for fever.   Respiratory: Negative for shortness of breath.    Cardiovascular: Negative for chest pain.   Gastrointestinal:  "Negative for nausea and vomiting.   Genitourinary: Negative for dysuria.   Musculoskeletal: Positive for back pain.   Skin: Negative for rash.   Neurological: Negative for weakness.   All other systems reviewed and are negative.      Physical Exam     Initial Vitals [07/07/22 0919]   BP Pulse Resp Temp SpO2   114/68 91 18 97.9 °F (36.6 °C) 97 %      MAP       --         Physical Exam    Nursing note and vitals reviewed.  Constitutional: He appears well-developed and well-nourished. No distress.   HENT:   Head: Normocephalic and atraumatic.   Eyes: Conjunctivae are normal.   Cardiovascular: Regular rhythm.   Pulmonary/Chest: Breath sounds normal.   Abdominal: Abdomen is soft. Bowel sounds are normal. There is no abdominal tenderness. There is no rebound and no guarding.   Musculoskeletal:         General: Normal range of motion.      Lumbar back: Spasms and tenderness present. No swelling, deformity, signs of trauma or bony tenderness.      Comments: Lumbar spine- left paravertebral tenderness.       Neurological: He is alert and oriented to person, place, and time. He has normal strength. Gait normal.   Pt able to get off ER bed with no difficulty or assistance. Pt reports after a few minutes of walking, " I will start to feel the pain"    Skin: Skin is warm and dry.   Psychiatric: He has a normal mood and affect. His behavior is normal. Judgment and thought content normal.         ED Course   Procedures  Labs Reviewed - No data to display       Imaging Results    None          Medications   dexamethasone injection 8 mg (8 mg Intramuscular Given 7/7/22 1023)     Medical Decision Making:   ED Management:  76-year-old male presents to the ER with complaint of sciatic pain.  Patient requesting MRI per his PCP.  Reviewed medical records.  Patient was sent by his PCP today for an MRI however his MRI scheduled for tomorrow, 07/08/2022.  Patient with no red flags regarding his back pain.  The patient denies bowel or " bladder incontinence, lower extremity weakness, saddle numbness.  He is able to get off the hospital bed with no difficulty or assistance and walk normally With no assistance observed by me.   Reviewed patient's medications-patient prescribed tramadol and Flexeril by PCP on 07/05/2022.  Discussed situation with the charge nurse Margaret and house supervisor  Pancho.  At this time no emergent MRI needed from the emergency department.  Explained this to the patient.  Patient is upset however verbalized understanding.  Patient given steroid shot in the ED today.  Strict ER precautions given to the patient who verbalized understanding.  Patient is stable for discharge.                      Clinical Impression:   Final diagnoses:  [M54.32] Left sided sciatica (Primary)          ED Disposition Condition    Discharge Stable        ED Prescriptions     None        Follow-up Information     Follow up With Specialties Details Why Contact Info    Lenard Miranda MD Internal Medicine In 1 day  [address]  [facility] [address]  [phone]      [facility] - Emergency Dept Emergency Medicine  As needed, If symptoms worsen [id number]  [facility] [id number] [phone]    [facility]  On 7/8/2022 Arrive at 545 am for your MRI [address]   [facility] [address]           Mary Ann De Los Santos MD  07/07/22 [id number]

## 2022-07-07 NOTE — ED NOTES
Chronic back pain w/no improvement, referred to ED by PCP to have MRI< MRI scheduled for tomorrow @ 06AM, denies bladder or bowel incontinence, ambulates w/ cane.

## 2022-07-08 ENCOUNTER — TELEPHONE (OUTPATIENT)
Dept: INTERNAL MEDICINE | Facility: CLINIC | Age: 77
End: 2022-07-08
Payer: MEDICARE

## 2022-07-08 DIAGNOSIS — M51.36 DDD (DEGENERATIVE DISC DISEASE), LUMBAR: Primary | ICD-10-CM

## 2022-07-08 DIAGNOSIS — M48.062 SPINAL STENOSIS OF LUMBAR REGION WITH NEUROGENIC CLAUDICATION: ICD-10-CM

## 2022-07-08 NOTE — TELEPHONE ENCOUNTER
----- Message from JOSE Sweeney sent at 7/8/2022  9:59 AM CDT -----  Dr. mcgowan reviewed MRI, patient with severe arthritis and stenosis of lumbar spine   Recommends referral to dr becerra, spinal specialist, referral placed  She would also like patient to go to physical therapy, ask where he would like to go and send referral  Dx: lumbar DDD

## 2022-07-08 NOTE — TELEPHONE ENCOUNTER
----- Message from Maeve Hong sent at 7/8/2022  8:11 AM CDT -----  Regarding: APPT  .Type:  Needs Medical Advice    Who Called: Pt  Symptoms (please be specific):    How long has patient had these symptoms:    Pharmacy name and phone #:    Would the patient rather a call back or a response via [facility]? Call back  Best Call Back Number: [phone]  Additional Information: Pt had MRI's done this morning and said he was supposed to f/u with clinic today. NO APPT is on pt's chart, pls f/u ..

## 2022-07-08 NOTE — TELEPHONE ENCOUNTER
Patient requested labs but did not want to do labs. Patient states he had labs done with Dr. Frazier, did not recall when those labs were done. Results requested 07.08.22 @11:07am

## 2022-07-08 NOTE — TELEPHONE ENCOUNTER
Patient informed and verbalized understanding       Patient states he wished to hold off on treatment , he schedule a visit with Dr. Lee 07.12.22 he will discuss results at that visit

## 2022-07-12 ENCOUNTER — OFFICE VISIT (OUTPATIENT)
Dept: INTERNAL MEDICINE | Facility: CLINIC | Age: 77
End: 2022-07-12
Payer: MEDICARE

## 2022-07-12 VITALS
TEMPERATURE: 98 F | HEART RATE: 56 BPM | SYSTOLIC BLOOD PRESSURE: 126 MMHG | WEIGHT: 187.63 LBS | OXYGEN SATURATION: 99 % | HEIGHT: 68 IN | DIASTOLIC BLOOD PRESSURE: 82 MMHG | RESPIRATION RATE: 16 BRPM | BODY MASS INDEX: 28.44 KG/M2

## 2022-07-12 DIAGNOSIS — G57.00 PIRIFORMIS SYNDROME, UNSPECIFIED LATERALITY: Primary | ICD-10-CM

## 2022-07-12 DIAGNOSIS — M54.59 DISCOGENIC LUMBAR PAIN: ICD-10-CM

## 2022-07-12 DIAGNOSIS — G57.02 PIRIFORMIS SYNDROME OF LEFT SIDE: ICD-10-CM

## 2022-07-12 DIAGNOSIS — M51.37 DEGENERATIVE DISC DISEASE AT L5-S1 LEVEL: ICD-10-CM

## 2022-07-12 DIAGNOSIS — R29.898 LEFT LEG WEAKNESS: ICD-10-CM

## 2022-07-12 PROBLEM — M51.379 DEGENERATIVE DISC DISEASE AT L5-S1 LEVEL: Status: ACTIVE | Noted: 2022-07-12

## 2022-07-12 PROCEDURE — 99213 PR OFFICE/OUTPT VISIT, EST, LEVL III, 20-29 MIN: ICD-10-PCS | Mod: ,,, | Performed by: INTERNAL MEDICINE

## 2022-07-12 PROCEDURE — 99213 OFFICE O/P EST LOW 20 MIN: CPT | Mod: ,,, | Performed by: INTERNAL MEDICINE

## 2022-07-12 RX ORDER — METHOCARBAMOL 500 MG/1
500 TABLET, FILM COATED ORAL 4 TIMES DAILY
Qty: 40 TABLET | Refills: 0 | Status: SHIPPED | OUTPATIENT
Start: 2022-07-12 | End: 2022-07-22

## 2022-07-12 NOTE — ASSESSMENT & PLAN NOTE
Patient has been treated with IM steroids both here and at the emergency room he reports that the Flexeril is very helpful but it is too sedating so will try him on some Robaxin pain he has pending appointment with [facility] in 2 weeks.  Has not med and attempt to go to physical therapy yet I have encouraged him the importance of doing that so send referral again today and hopefully they can see him tomorrow

## 2022-07-12 NOTE — PROGRESS NOTES
"TIME UP & GO (TUG)  Test begins with patient sitting back in standard arm chair.   When "Go" is said, the patient stands up and walks 10 feet at a normal pace before turning, walking back and sitting down.    Observe the patients postural stability, gait, stride length, and sway.  Check all that apply:  ? [] Slow tentative pace  ? [] Loss of balance  ? [] Short strides  ? [] Little or no arm swing  ? [] Steadying self on walls  ? [] Shuffling  ? [] En bloc turning  ? [] Not using assistive device properly    Time in seconds:  10 Seconds  (Older adults who takes = or > 12 seconds to complete TUG is at risk for falling.      WHISPER TEST  Test begins with patient standing arms length away (2 feet), facing away from examiner.  Patient covers the ear that is NOT being tested with one finger over the tragus.  Whisper a number-letter-number combination.  If a patient gets 3 total letters and/or numbers correct after a second attempt, it is considered a pass.    Right Ear: passed    [] 8-M-3   [] K-5-R   [] 2-K-7   [] S-4-G  Left Ear: passed       [] 8-M-3   [] K-5-R   [] 2-K-7   [] S-4-G      VISION SCREENING  unable to measure      MINI-COGNITIVE  Three Word Registration   []Version 1 []Version 2 []Version 3 []Version 4 []Version 5 []Version 6   [facility] Captain Daughter   [place] [employer] Heaven   Chair Table Baby Finger Picture Moutain     Word Recall 3 points  Clock Drawing 2      HOME SAFETY QUESTIONNAIRE  Are emergency numbers kept by the phone and regularly updated? Yes  Are all household members aware of the dangers of smoking, especially in bed? Yes  Are working smoke alarm(s) and fire extinguisher(s) available for use? Yes  Do all household members know how to use them? Yes  Are firearms stored unloaded and securely locked? N/A  Have throw rugs been removed or fastened down? Yes  Are non-slip mats in all bathtubs and showers?  Yes  Do all stairways have a railing or " banister?  Yes  Are sidewalks and all outdoor steps clear of tools, toys and other articles?  Yes  Are doorways, halls, and stairs free of clutter?  Yes  Are all electrical cords in working order, easily seen, and not run under rug/carpets or wrapped around nails? Yes

## 2022-07-12 NOTE — PROGRESS NOTES
Subjective:      Patient ID: Deshaun Lang is a 76 y.o. male.    Chief Complaint: Medicare AWV      HPI:  Medicare wellness is not due to August 3rd  76 year-old  male presents to clinic today for revisit  Past medical history of rheumatoid arthritis treated by Dr. Gely Herman with plaquenil   Dr. Qiu for optho  Also medical history of hypertension, HLD, insmonia and BPH.  He is up-to-date with screening colonoscopy as well as PSA checks- Dr Obando  Patient is intolerant to statins and takes cholestyramine but does not take it as prescribed.  He works on as a restoration  on [employer]   S/P right knee replacement doing excellent so far he said both knees done in both shoulders replaced  TURP with Dallin Obando- last visit was a year ago, due for PSA and prostate exam. 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  He also reports somewhat not as strong urinary stream but reports that he is not incontinent of urine and has full control of his bowels as well.  Straight leg raise is negative tenderness noted in the sciatic notch in the area of the piriformis on the left;  normal external range of motion of the left hip    MRI, patient with severe arthritis and stenosis of lumbar spine   Recommends referral to dr becerra, spinal specialist, referral placed.  Also referral placed to physical therapy last Friday he has not made any attempts to go yet.  His complaints of the same nothing has improved      Problem List Items Addressed This Visit        Neuro    Piriformis syndrome of left side    Degenerative disc disease at L5-S1 level    Current  Assessment & Plan     Patient has been treated with IM steroids both here and at the emergency room he reports that the Flexeril is very helpful but it is too sedating so will try him on some Robaxin pain he has pending appointment with Spine Orthopedics in 2 weeks.  Has not med and attempt to go to physical therapy yet I have encouraged him the importance of doing that so send referral again today and hopefully they can see him tomorrow             Other Visit Diagnoses     Piriformis syndrome, unspecified laterality    -  Primary    Relevant Orders    Ambulatory referral/consult to Physical/Occupational Therapy    Left leg weakness        Relevant Orders    Ambulatory referral/consult to Physical/Occupational Therapy    Discogenic lumbar pain        Relevant Orders    Ambulatory referral/consult to Physical/Occupational Therapy              Past Medical History:  Past Medical History:   Diagnosis Date    Acute renal failure syndrome     Carpal tunnel syndrome     Cataracts, bilateral     HLD (hyperlipidemia)     HTN (hypertension)     Unspecified osteoarthritis, unspecified site      Past Surgical History:   Procedure Laterality Date    APPENDECTOMY  1962    CARPAL TUNNEL RELEASE      CATARACT EXTRACTION      COLON SURGERY  1962    EYE SURGERY  2012    JOINT REPLACEMENT  Both knees  2009    NEUROPLASTY  03/22/2012    TONSILLECTOMY  As a child    TOTAL KNEE ARTHROPLASTY  08/22/2017    TRANSURETHRAL RESECTION OF PROSTATE  08/30/2017     Review of patient's allergies indicates:   Allergen Reactions    Penicillins Other (See Comments)     Unknown what type of reaction      Statins-hmg-coa reductase inhibitors Other (See Comments)     Unknown what type of reaction     Current Outpatient Medications on File Prior to Visit   Medication Sig Dispense Refill    amLODIPine-benazepriL (LOTREL) 10-40 mg per capsule Take 1 capsule by mouth Daily.      apixaban (ELIQUIS) 5 mg Tab Take 5 mg by mouth 2 (two)  times a day.      aspirin (ECOTRIN) 81 MG EC tablet Take 81 mg by mouth Daily.      colestipoL (COLESTID) 1 gram Tab Take 1 tablet by mouth 2 (two) times a day. With a full glass of water      eszopiclone (LUNESTA) 2 MG Tab TAKE ONE TABLET AT BEDTIME AS NEEDED FOR insomnia 30 tablet 0    hydrOXYchloroQUINE (PLAQUENIL) 200 mg tablet Take 200 mg by mouth 2 (two) times daily.      meloxicam (MOBIC) 15 MG tablet Take 15 mg by mouth once daily.      predniSONE (DELTASONE) 5 MG tablet Take 5 mg by mouth once daily.      tamsulosin (FLOMAX) 0.4 mg Cap Take 1 capsule by mouth 2 (two) times daily.      traMADoL (ULTRAM) 50 mg tablet TAKE ONE TABLET EVERY 6 HOURS. AS NEEDED FOR PAIN MUST LAST 30 DAYS 60 tablet 0    vitamin D (VITAMIN D3) 1000 units Tab Take 1,000 Units by mouth once daily.      zinc gluconate 50 mg tablet Take 50 mg by mouth once daily.      [DISCONTINUED] cyclobenzaprine (FLEXERIL) 10 MG tablet TAKE ONE TABLET 3 TIMES DAILY AS NEEDED FOR MUSCLE SPASMS 30 tablet 0     No current facility-administered medications on file prior to visit.     Social History     Socioeconomic History    Marital status:    Tobacco Use    Smoking status: Former Smoker     Years: 10.00     Types: Cigars     Start date: 6/15/1962     Quit date: 6/15/1972     Years since quittin.1    Smokeless tobacco: Never Used   Substance and Sexual Activity    Alcohol use: Not Currently    Drug use: Never    Sexual activity: Not Currently     Family History   Problem Relation Age of Onset    Arthritis Father     Hearing loss Father     Stroke Father     Alcohol abuse Maternal Uncle            Review of Systems  Constitutional: No fever,  no fatigue, no chills, no night sweats, no weight gain, no weight loss, no changes in appetite.   Eye: No redness, no acute vision loss, no blurred vision, no double vision, no eye pain  ENMT: No sore throat, no nasal drainage, no nose bleeds,  no headache, no ear pain, no ear  "drainage, no acute hearing loss  Respiratory: No cough, no sputum production, no shortness of breath, no hemoptysis, no wheezing.  Cardiovascular: No chest pain, no chest tightness, no [place]ST[place], no PND, no orthopnea, no swelling, no palpitations.  Gastrointestinal: No abdominal pain, no nausea, no vomiting, no diarrhea, no constipation, no difficulty swallowing, no change in bowel habits, no rectal bleeding  Genitourinary: no urgency, no frequency, no burning or pain when urinating, no blood in urine, no incontinence  Heme/Lymph: No easy bruising and/or bleeding, no swollen or painful glands.  Endocrine: No polyuria, no polydipsia, no polyphagia, no heat or cold intolerance.  Musculoskeletal: No muscle pain, no muscle weakness, no joint pain, no red or swollen joints.  Integumentary: No rash, no pruritis, no hair or nail changes.  Neurologic: No dizziness, no fainting, no tremors, + tingling and/ or numbness.  Psychiatric: No anxiety, no depression, no memory loss  All Other ROS: Negative with exception of what is documented in the history of present illness     Objective:   /82 (BP Location: Left arm, Patient Position: Sitting, BP Method: Medium (Manual))   Pulse (!) 56   Temp 97.5 °F (36.4 °C) (Temporal)   Resp 16   Ht 5' 8" (1.727 m)   Wt 85.1 kg (187 lb 9.6 oz)   SpO2 99%   BMI 28.52 kg/m²     Physical Exam    General : Alert and oriented, No acute distress, well, developed, well nourished, afebrile   Eye : PERRLA. EOMI.   Musculoskeletal :  Tenderness in the left sciatic notch, straight leg raise negative, normal external range of motion of the left hip    Neurologic : Alert and oriented  Psychiatric : Cooperative, Appropriate mood & affect. Normal judgment.        Assessment:     1. Piriformis syndrome, unspecified laterality    2. Left leg weakness    3. Discogenic lumbar pain    4. Degenerative disc disease at L5-S1 level    5. Piriformis syndrome of left side                  Plan:       I have " discontinued Luis Armando Lang's cyclobenzaprine. I am also having him start on methocarbamoL. Additionally, I am having him maintain his eszopiclone, amLODIPine-benazepriL, colestipoL, meloxicam, predniSONE, tamsulosin, hydrOXYchloroQUINE, vitamin D, zinc gluconate, apixaban, aspirin, and traMADoL.      Problem List Items Addressed This Visit        Neuro    Piriformis syndrome of left side    Degenerative disc disease at L5-S1 level     Patient has been treated with IM steroids both here and at the emergency room he reports that the Flexeril is very helpful but it is too sedating so will try him on some Robaxin pain he has pending appointment with Spine Orthopedics in 2 weeks.  Has not med and attempt to go to physical therapy yet I have encouraged him the importance of doing that so send referral again today and hopefully they can see him tomorrow             Other Visit Diagnoses     Piriformis syndrome, unspecified laterality    -  Primary    Relevant Orders    Ambulatory referral/consult to Physical/Occupational Therapy    Left leg weakness        Relevant Orders    Ambulatory referral/consult to Physical/Occupational Therapy    Discogenic lumbar pain        Relevant Orders    Ambulatory referral/consult to Physical/Occupational Therapy            Deshaun was seen today for medicare awv.    Diagnoses and all orders for this visit:    Piriformis syndrome, unspecified laterality  -     Ambulatory referral/consult to Physical/Occupational Therapy; Future    Left leg weakness  -     Ambulatory referral/consult to Physical/Occupational Therapy; Future    Discogenic lumbar pain  -     Ambulatory referral/consult to Physical/Occupational Therapy; Future    Degenerative disc disease at L5-S1 level    Piriformis syndrome of left side    Other orders  -     methocarbamoL (ROBAXIN) 500 MG Tab; Take 1 tablet (500 mg total) by mouth 4 (four) times daily. for 10 days            Medications Ordered This Encounter    Medications    methocarbamoL (ROBAXIN) 500 MG Tab     Sig: Take 1 tablet (500 mg total) by mouth 4 (four) times daily. for 10 days     Dispense:  40 tablet     Refill:  0     [unfilled]  Orders Placed This Encounter   Procedures    Ambulatory referral/consult to Physical/Occupational Therapy     Standing Status:   Future     Standing Expiration Date:   8/12/2023     Referral Priority:   Routine     Referral Type:   Physical Medicine     Referral Reason:   Specialty Services Required     Number of Visits Requested:   1       Medication List with Changes/Refills   New Medications    METHOCARBAMOL (ROBAXIN) 500 MG TAB    Take 1 tablet (500 mg total) by mouth 4 (four) times daily. for 10 days   Current Medications    AMLODIPINE-BENAZEPRIL (LOTREL) 10-40 MG PER CAPSULE    Take 1 capsule by mouth Daily.    APIXABAN (ELIQUIS) 5 MG TAB    Take 5 mg by mouth 2 (two) times a day.    ASPIRIN (ECOTRIN) 81 MG EC TABLET    Take 81 mg by mouth Daily.    COLESTIPOL (COLESTID) 1 GRAM TAB    Take 1 tablet by mouth 2 (two) times a day. With a full glass of water    ESZOPICLONE (LUNESTA) 2 MG TAB    TAKE ONE TABLET AT BEDTIME AS NEEDED FOR insomnia    HYDROXYCHLOROQUINE (PLAQUENIL) 200 MG TABLET    Take 200 mg by mouth 2 (two) times daily.    MELOXICAM (MOBIC) 15 MG TABLET    Take 15 mg by mouth once daily.    PREDNISONE (DELTASONE) 5 MG TABLET    Take 5 mg by mouth once daily.    TAMSULOSIN (FLOMAX) 0.4 MG CAP    Take 1 capsule by mouth 2 (two) times daily.    TRAMADOL (ULTRAM) 50 MG TABLET    TAKE ONE TABLET EVERY 6 HOURS. AS NEEDED FOR PAIN MUST LAST 30 DAYS    VITAMIN D (VITAMIN D3) 1000 UNITS TAB    Take 1,000 Units by mouth once daily.    ZINC GLUCONATE 50 MG TABLET    Take 50 mg by mouth once daily.   Discontinued Medications    CYCLOBENZAPRINE (FLEXERIL) 10 MG TABLET    TAKE ONE TABLET 3 TIMES DAILY AS NEEDED FOR MUSCLE SPASMS      Medication List with Changes/Refills   New Medications    METHOCARBAMOL (ROBAXIN) 500 MG TAB     Take 1 tablet (500 mg total) by mouth 4 (four) times daily. for 10 days       Start Date: 7/12/2022 End Date: 7/22/2022   Current Medications    AMLODIPINE-BENAZEPRIL (LOTREL) 10-40 MG PER CAPSULE    Take 1 capsule by mouth Daily.       Start Date: 6/24/2022 End Date: --    APIXABAN (ELIQUIS) 5 MG TAB    Take 5 mg by mouth 2 (two) times a day.       Start Date: 2/8/2022  End Date: --    ASPIRIN (ECOTRIN) 81 MG EC TABLET    Take 81 mg by mouth Daily.       Start Date: --        End Date: --    COLESTIPOL (COLESTID) 1 GRAM TAB    Take 1 tablet by mouth 2 (two) times a day. With a full glass of water       Start Date: 4/27/2022 End Date: --    ESZOPICLONE (LUNESTA) 2 MG TAB    TAKE ONE TABLET AT BEDTIME AS NEEDED FOR insomnia       Start Date: 6/9/2022  End Date: --    HYDROXYCHLOROQUINE (PLAQUENIL) 200 MG TABLET    Take 200 mg by mouth 2 (two) times daily.       Start Date: 6/9/2022  End Date: --    MELOXICAM (MOBIC) 15 MG TABLET    Take 15 mg by mouth once daily.       Start Date: 6/3/2022  End Date: --    PREDNISONE (DELTASONE) 5 MG TABLET    Take 5 mg by mouth once daily.       Start Date: 6/3/2022  End Date: --    TAMSULOSIN (FLOMAX) 0.4 MG CAP    Take 1 capsule by mouth 2 (two) times daily.       Start Date: 5/5/2022  End Date: --    TRAMADOL (ULTRAM) 50 MG TABLET    TAKE ONE TABLET EVERY 6 HOURS. AS NEEDED FOR PAIN MUST LAST 30 DAYS       Start Date: 7/5/2022  End Date: --    VITAMIN D (VITAMIN D3) 1000 UNITS TAB    Take 1,000 Units by mouth once daily.       Start Date: --        End Date: --    ZINC GLUCONATE 50 MG TABLET    Take 50 mg by mouth once daily.       Start Date: --        End Date: --   Discontinued Medications    CYCLOBENZAPRINE (FLEXERIL) 10 MG TABLET    TAKE ONE TABLET 3 TIMES DAILY AS NEEDED FOR MUSCLE SPASMS       Start Date: 7/11/2022 End Date: 7/12/2022            Follow up in about 4 weeks (around 8/9/2022) for WELLNESS.

## 2022-08-02 DIAGNOSIS — Z12.5 SCREENING PSA (PROSTATE SPECIFIC ANTIGEN): ICD-10-CM

## 2022-08-02 DIAGNOSIS — I10 HYPERTENSION, UNSPECIFIED TYPE: Primary | ICD-10-CM

## 2022-08-02 DIAGNOSIS — E78.5 HYPERLIPIDEMIA, UNSPECIFIED HYPERLIPIDEMIA TYPE: ICD-10-CM

## 2022-08-08 ENCOUNTER — LAB VISIT (OUTPATIENT)
Dept: LAB | Facility: HOSPITAL | Age: 77
End: 2022-08-08
Attending: INTERNAL MEDICINE
Payer: MEDICARE

## 2022-08-08 DIAGNOSIS — E78.5 HYPERLIPIDEMIA, UNSPECIFIED HYPERLIPIDEMIA TYPE: ICD-10-CM

## 2022-08-08 DIAGNOSIS — Z12.5 SCREENING PSA (PROSTATE SPECIFIC ANTIGEN): ICD-10-CM

## 2022-08-08 DIAGNOSIS — I10 HYPERTENSION, UNSPECIFIED TYPE: ICD-10-CM

## 2022-08-08 LAB
ALBUMIN SERPL-MCNC: 3.8 GM/DL (ref 3.4–4.8)
ALBUMIN/GLOB SERPL: 1.5 RATIO (ref 1.1–2)
ALP SERPL-CCNC: 111 UNIT/L (ref 40–150)
ALT SERPL-CCNC: 21 UNIT/L (ref 0–55)
AST SERPL-CCNC: 19 UNIT/L (ref 5–34)
BASOPHILS # BLD AUTO: 0.03 X10(3)/MCL (ref 0–0.2)
BASOPHILS NFR BLD AUTO: 0.3 %
BILIRUBIN DIRECT+TOT PNL SERPL-MCNC: 0.6 MG/DL
BUN SERPL-MCNC: 22.3 MG/DL (ref 8.4–25.7)
CALCIUM SERPL-MCNC: 8.6 MG/DL (ref 8.8–10)
CHLORIDE SERPL-SCNC: 109 MMOL/L (ref 98–107)
CHOLEST SERPL-MCNC: 201 MG/DL
CHOLEST/HDLC SERPL: 4 {RATIO} (ref 0–5)
CO2 SERPL-SCNC: 28 MMOL/L (ref 23–31)
CREAT SERPL-MCNC: 0.91 MG/DL (ref 0.73–1.18)
EOSINOPHIL # BLD AUTO: 0.13 X10(3)/MCL (ref 0–0.9)
EOSINOPHIL NFR BLD AUTO: 1.4 %
ERYTHROCYTE [DISTWIDTH] IN BLOOD BY AUTOMATED COUNT: 13.9 % (ref 11.5–17)
GFR SERPLBLD CREATININE-BSD FMLA CKD-EPI: >60 MLS/MIN/1.73/M2
GLOBULIN SER-MCNC: 2.6 GM/DL (ref 2.4–3.5)
GLUCOSE SERPL-MCNC: 90 MG/DL (ref 82–115)
HCT VFR BLD AUTO: 46.2 % (ref 42–52)
HDLC SERPL-MCNC: 53 MG/DL (ref 35–60)
HGB BLD-MCNC: 15 GM/DL (ref 14–18)
IMM GRANULOCYTES # BLD AUTO: 0.03 X10(3)/MCL (ref 0–0.04)
IMM GRANULOCYTES NFR BLD AUTO: 0.3 %
LDLC SERPL CALC-MCNC: 137 MG/DL (ref 50–140)
LYMPHOCYTES # BLD AUTO: 2.99 X10(3)/MCL (ref 0.6–4.6)
LYMPHOCYTES NFR BLD AUTO: 31.5 %
MCH RBC QN AUTO: 30.7 PG (ref 27–31)
MCHC RBC AUTO-ENTMCNC: 32.5 MG/DL (ref 33–36)
MCV RBC AUTO: 94.7 FL (ref 80–94)
MONOCYTES # BLD AUTO: 0.81 X10(3)/MCL (ref 0.1–1.3)
MONOCYTES NFR BLD AUTO: 8.5 %
NEUTROPHILS # BLD AUTO: 5.5 X10(3)/MCL (ref 2.1–9.2)
NEUTROPHILS NFR BLD AUTO: 58 %
NRBC BLD AUTO-RTO: 0 %
PLATELET # BLD AUTO: 223 X10(3)/MCL (ref 130–400)
PMV BLD AUTO: 9.8 FL (ref 7.4–10.4)
POTASSIUM SERPL-SCNC: 3.6 MMOL/L (ref 3.5–5.1)
PROT SERPL-MCNC: 6.4 GM/DL (ref 5.8–7.6)
PSA SERPL-MCNC: 0.74 NG/ML
RBC # BLD AUTO: 4.88 X10(6)/MCL (ref 4.7–6.1)
SODIUM SERPL-SCNC: 143 MMOL/L (ref 136–145)
TRIGL SERPL-MCNC: 56 MG/DL (ref 34–140)
TSH SERPL-ACNC: 3.41 UIU/ML (ref 0.35–4.94)
VLDLC SERPL CALC-MCNC: 11 MG/DL
WBC # SPEC AUTO: 9.5 X10(3)/MCL (ref 4.5–11.5)

## 2022-08-08 PROCEDURE — 84153 ASSAY OF PSA TOTAL: CPT

## 2022-08-08 PROCEDURE — 36415 COLL VENOUS BLD VENIPUNCTURE: CPT

## 2022-08-08 PROCEDURE — 80053 COMPREHEN METABOLIC PANEL: CPT

## 2022-08-08 PROCEDURE — 85025 COMPLETE CBC W/AUTO DIFF WBC: CPT

## 2022-08-08 PROCEDURE — 80061 LIPID PANEL: CPT

## 2022-08-08 PROCEDURE — 84443 ASSAY THYROID STIM HORMONE: CPT

## 2022-08-09 ENCOUNTER — OFFICE VISIT (OUTPATIENT)
Dept: INTERNAL MEDICINE | Facility: CLINIC | Age: 77
End: 2022-08-09
Payer: MEDICARE

## 2022-08-09 VITALS
HEART RATE: 74 BPM | TEMPERATURE: 97 F | OXYGEN SATURATION: 99 % | SYSTOLIC BLOOD PRESSURE: 138 MMHG | HEIGHT: 68 IN | RESPIRATION RATE: 16 BRPM | DIASTOLIC BLOOD PRESSURE: 86 MMHG | BODY MASS INDEX: 28.57 KG/M2 | WEIGHT: 188.5 LBS

## 2022-08-09 DIAGNOSIS — E78.5 HYPERLIPIDEMIA, UNSPECIFIED HYPERLIPIDEMIA TYPE: ICD-10-CM

## 2022-08-09 DIAGNOSIS — N40.0 BENIGN PROSTATIC HYPERPLASIA, UNSPECIFIED WHETHER LOWER URINARY TRACT SYMPTOMS PRESENT: ICD-10-CM

## 2022-08-09 DIAGNOSIS — M19.90 OSTEOARTHRITIS, UNSPECIFIED OSTEOARTHRITIS TYPE, UNSPECIFIED SITE: ICD-10-CM

## 2022-08-09 DIAGNOSIS — I10 HYPERTENSION, UNSPECIFIED TYPE: ICD-10-CM

## 2022-08-09 DIAGNOSIS — Z00.00 MEDICARE ANNUAL WELLNESS VISIT, SUBSEQUENT: Primary | ICD-10-CM

## 2022-08-09 PROCEDURE — G0439 PPPS, SUBSEQ VISIT: HCPCS | Mod: ,,, | Performed by: INTERNAL MEDICINE

## 2022-08-09 PROCEDURE — G0439 PR MEDICARE ANNUAL WELLNESS SUBSEQUENT VISIT: ICD-10-PCS | Mod: ,,, | Performed by: INTERNAL MEDICINE

## 2022-08-09 NOTE — PROGRESS NOTES
Patient Name: Deshaun Lang     Date of service:  8/9/22      Member ID: [id number] - (Medicare)    YOB: 1945   Gender: male   Race: White      Ethnicity: Not  or /a   Medical Record Number: [id number]     Reason for Visit:   Chief Complaint   Patient presents with    Medicare AWV        History of Present Illness:   77 year-old  male presents to clinic today for wellness  Past medical history of rheumatoid arthritis treated by Dr. Gely Herman with plaquenil   Dr. Qiu for optho  Also medical history of hypertension, HLD, insmonia and BPH.  He is up-to-date with screening colonoscopy as well as PSA checks- Dr Obando  Patient is intolerant to statins and takes cholestyramine but does not take it as prescribed.  He works on as a restoration  on airplanes    S/P right knee replacement doing excellent so far he said both knees done in both shoulders replaced  TURP with Dallin Obando- last visit was a year ago, due for PSA and prostate exam. 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, flies once a week   [facility] for Derm- precancer to scalp, starts 5FU in Feb 1    Past Medical History:   Diagnosis Date    Acute renal failure syndrome     Carpal tunnel syndrome     Cataracts, bilateral     HLD (hyperlipidemia)     HTN (hypertension)     Unspecified osteoarthritis, unspecified site       Providers regularly involved with care (specialists/suppliers):   Patient Care Team:  Lenard Miranda MD as PCP - General (Internal Medicine)  MD Gerardo Vasquez MD as Consulting Physician (Urology)  Gely Herman MD as Consulting Physician (Rheumatology)     Past Surgical History:   Procedure Laterality Date    APPENDECTOMY  1962    CARPAL TUNNEL RELEASE      CATARACT EXTRACTION      COLON SURGERY  1962    EYE SURGERY  2012    JOINT REPLACEMENT  Both knees  2009    NEUROPLASTY  03/22/2012     TONSILLECTOMY  As a child    TOTAL KNEE ARTHROPLASTY  2017    TRANSURETHRAL RESECTION OF PROSTATE  2017        Medication reconciliation completed.      No current facility-administered medications for this visit.      There are no discontinued medications.     Review of patient's allergies indicates:   Allergen Reactions    Penicillins Other (See Comments)     Unknown what type of reaction      Statins-hmg-coa reductase inhibitors Other (See Comments)     Unknown what type of reaction     Social History     Socioeconomic History    Marital status:    Tobacco Use    Smoking status: Former Smoker     Years: 10.00     Types: Cigars     Start date: 6/15/1962     Quit date: 6/15/1972     Years since quittin.1    Smokeless tobacco: Never Used   Substance and Sexual Activity    Alcohol use: Not Currently    Drug use: Never    Sexual activity: Not Currently        Family History   Problem Relation Age of Onset    Arthritis Father     Hearing loss Father     Stroke Father     Alcohol abuse Maternal Uncle           Review of Systems   Constitutional: No fever, no chills, no night sweats, no changes in weight, no changes in appetite.  Eye: No blurring of vision, no double vision, no conjunctival injection, no acute vision loss  ENMT: No trauma, No sore throat, no rhinorrhea, no tinnitus, no headache, no vertigo, no ear pain or discharge  Respiratory: No cough, no sputum production, no shortness of breath, no hemoptysis, no wheezing.  Cardiovascular: No chest pain, no [place], no PND, no orthopnea, no edema, no palpitations.  Gastrointestinal: No abdominal pain, nausea, no vomiting, no changes in frequency or consistency of stools, no diarrhea, no constipation, no BRBPR.  Genitourinary: No dysuria, no hematuria, no foul-smelling urine, no straining to urinate, no increase in frequency  Heme/Lymph: No easy bruising/ bleeding, no swollen or painful glands.  Endocrine: No polyuria, no polydipsia,  "no polyphagia, no heat or cold intolerance.  Musculoskeletal: No muscle pain, no muscle weakness, no joint pain, no difficulties on reference to range of motion.  Integumentary: No rash, no pruritis.  Neurologic: No sensory deficit, no motor deficit, no headache, no neck rigidity, no paresthesias, no syncope.  Psychiatric: no mood changes, no anxiety, no depression, no tension, no memory defecits  All Other ROS: Negative with exception of what is documented in the history of present illness  Vitals:    22 1509   BP: 138/86   Pulse: 74   Resp: 16   Temp: 97.4 °F (36.3 °C)   TempSrc: Temporal   SpO2: 99%   Weight: 85.5 kg (188 lb 8 oz)   Height: 5' 8" (1.727 m)      Body mass index is 28.66 kg/m².       Physical Exam   General : Alert and oriented, No acute distress, afebrile.  Eye : PERRLA. EOMI  Respiratory : Respirations are non-labored and clear to auscultation bilaterally. Symmetrical air entry bilaterally, no crackles, no wheezes, no rhonchi. No cyanosis, no clubbing.  Cardiovascular : Normal rate, Regular rhythm. No murmurs, rubs, or gallops. Pulses are 2+ throughout. No JVD. No Edema.  Gastrointestinal : Soft, nontender, non-distended, bowel sounds are present in all quadrants, no organomegaly, no guarding, no rebound.  Musculoskeletal : Normal range of motion throughout. No muscle tenderness.  Integumentary : Warm, moist, intact.  Neurologic : Alert, Oriented  Psychiatric : Cooperative, Appropriate mood & affect.    Health Maintenance Topics with due status: Not Due       Topic Last Completion Date    Influenza Vaccine 2019    Lipid Panel 2022        Social History     Tobacco Use   Smoking Status Former Smoker    Years: 10.00    Types: Cigars    Start date: 6/15/1962    Quit date: 6/15/1972    Years since quittin.1   Smokeless Tobacco Never Used          Immunization History   Administered Date(s) Administered    COVID-19, MRNA, LN-S, PF (MODERNA FULL 0.5 ML DOSE) 2021, " 03/09/2021, 11/30/2021    Influenza - Trivalent - PF (ADULT) 11/07/2016, 11/06/2017, 11/07/2018, 11/18/2019    Influenza Whole 11/07/2012    Meningococcal Conjugate (MCV4P) 05/13/2013    Pneumococcal Conjugate - 13 Valent 05/13/2013, 06/12/2017    Pneumococcal Polysaccharide - 23 Valent 06/28/2018    Tdap 01/04/2007    Yellow Fever 01/04/2007    Zoster 07/26/2016, 06/24/2019, 09/06/2019    Zoster Recombinant 06/24/2019, 09/06/2019        Lab Results   Component Value Date    CHOL 201 (H) 08/08/2022    TRIG 56 08/08/2022    HDL 53 08/08/2022    TOTALCHOLEST 4 08/08/2022        The following assessments were completed and reviewed.  Timed Get Up and Go  Depression screening  Whisper Test  Nutrition screening  Cognitive function screening  ADLs/Functional status assessment  Physical Activity Questionnaire (PAQ)  Functional/Cognitive Status: Disability Status      Diagnoses with ICD-10 code:    ICD-10-CM ICD-9-CM   1. Medicare annual wellness visit, subsequent  Z00.00 V70.0   2. Osteoarthritis, unspecified osteoarthritis type, unspecified site  M19.90 715.90   3. Benign prostatic hyperplasia, unspecified whether lower urinary tract symptoms present  N40.0 600.00   4. Hypertension, unspecified type  I10 401.9   5. Hyperlipidemia, unspecified hyperlipidemia type  E78.5 272.4      Diagnoses with associated orders:  Problem List Items Addressed This Visit        Cardiac/Vascular    HTN (hypertension)    HLD (hyperlipidemia)       Renal/    Benign prostatic hyperplasia    Overview     Formatting of this note might be different from the original.  Replacing Dx inactivated by 10/1 regulatory import              Orthopedic    Osteoarthritis    Overview     Formatting of this note might be different from the original.  Hands              Other    Medicare annual wellness visit, subsequent - Primary        Plan:  1.  Continue current medications  2.  Side effects and expected results discussed  3.  Diet and exercise as  tolerated  4.  Nutritional support  5.  Health maintenance updated accordingly  6.  Call with increased complaints or concerns      Screening/prevention plan for the next 10 years:  Goal of exercise is 150 minutes a week.   Encouraged to follow balanced diet with daily servings of fresh fruit and vegetables.  Make sure to schedule all health maintenance appointments to achieve health care goals.   Annual check up is due every 12 months with your designated provider/care team.  Health Maintenance Due   Topic Date Due    Hepatitis C Screening  Never done    Sign Pain Contract  Never done    Complete Opioid Risk Tool  Never done    TETANUS VACCINE  01/04/2017    Shingles Vaccine (2 of 3) 11/01/2019    COVID-19 Vaccine (4 - Booster for Moderna series) 03/30/2022      Follow-up: Follow up in about 6 months (around 2/9/2023) for with labs prior to visit.

## 2022-08-15 DIAGNOSIS — G47.09 OTHER INSOMNIA: ICD-10-CM

## 2022-08-15 RX ORDER — ESZOPICLONE 2 MG/1
2 TABLET, FILM COATED ORAL NIGHTLY
Qty: 30 TABLET | Refills: 0 | Status: SHIPPED | OUTPATIENT
Start: 2022-08-15 | End: 2022-09-15

## 2022-08-15 NOTE — TELEPHONE ENCOUNTER
----- Message from Letty Rowe sent at 8/15/2022  2:01 PM CDT -----  Eszopiclone 2MG, Qty: 30, [facility]

## 2022-09-19 ENCOUNTER — TELEPHONE (OUTPATIENT)
Dept: INTERNAL MEDICINE | Facility: CLINIC | Age: 77
End: 2022-09-19
Payer: MEDICARE

## 2022-09-19 DIAGNOSIS — M51.37 DEGENERATIVE DISC DISEASE AT L5-S1 LEVEL: Primary | ICD-10-CM

## 2022-09-19 NOTE — TELEPHONE ENCOUNTER
----- Message from Lenard Miranda MD sent at 9/19/2022 11:48 AM CDT -----  Yes ok to send to PT of patients choice  ----- Message -----  From: Kelly Prater  Sent: 9/19/2022  11:02 AM CDT  To: Lenard Miranda MD    Pt asked if you would be able to provide a referral for him to do Physical Therapy, he has been having Neck Pain. He has a strain muscle and spoke with the Therapist, but in order for him to get the Therapy, they told him he needs to do it the right way and get his Doctor to send a referral.

## 2022-10-05 ENCOUNTER — TELEPHONE (OUTPATIENT)
Dept: INTERNAL MEDICINE | Facility: CLINIC | Age: 77
End: 2022-10-05
Payer: MEDICARE

## 2022-10-05 DIAGNOSIS — I48.91 UNSPECIFIED ATRIAL FIBRILLATION: ICD-10-CM

## 2022-10-05 DIAGNOSIS — I10 HYPERTENSION, UNSPECIFIED TYPE: ICD-10-CM

## 2022-10-05 DIAGNOSIS — E78.5 HYPERLIPIDEMIA, UNSPECIFIED HYPERLIPIDEMIA TYPE: Primary | ICD-10-CM

## 2022-10-05 NOTE — TELEPHONE ENCOUNTER
----- Message from Kelly Prater sent at 10/5/2022  8:42 AM CDT -----  Pt requested 1 months supply of Eliquis, instead of 15 days at a time.    Med: Eliquis 5MG  Qty: 60 (for 1mths supply)  Pharmacy: [facility]

## 2022-10-06 RX ORDER — APIXABAN 5 MG/1
TABLET, FILM COATED ORAL
Qty: 30 TABLET | Refills: 11 | OUTPATIENT
Start: 2022-10-06

## 2022-10-11 ENCOUNTER — TELEPHONE (OUTPATIENT)
Dept: INTERNAL MEDICINE | Facility: CLINIC | Age: 77
End: 2022-10-11
Payer: MEDICARE

## 2022-10-11 DIAGNOSIS — M54.12 CERVICAL RADICULOPATHY: Primary | ICD-10-CM

## 2022-10-11 NOTE — TELEPHONE ENCOUNTER
----- Message from Lenard Miranda MD sent at 10/11/2022 10:04 AM CDT -----  Can we get a mri of the c spine wo contrast for cervical radiculopathy please  ----- Message -----  From: Letty Jae  Sent: 10/11/2022   9:55 AM CDT  To: Lenard Miranda MD    Pt wanted to see if he can get and order for a MRI . Pt stated he still having a lot of neck pain. Pt been taking his muscle relaxer's and its not helping his pain. Pt have and appt with  next Monday but wanted to get the MRI done before. Please advise?

## 2022-10-14 ENCOUNTER — TELEPHONE (OUTPATIENT)
Dept: INTERNAL MEDICINE | Facility: CLINIC | Age: 77
End: 2022-10-14
Payer: MEDICARE

## 2022-10-14 NOTE — TELEPHONE ENCOUNTER
----- Message from Lenard Miranda MD sent at 10/13/2022 10:45 AM CDT -----  MRI of the cervical spine reviewed with evidence diffuse advanced cervical spine discogenic disease.  Worse at C5-C6 on the right.  No evidence of cord compromise or stenosis.    Follow-up with Dr. James as scheduled.

## 2022-11-14 PROBLEM — Z00.00 MEDICARE ANNUAL WELLNESS VISIT, SUBSEQUENT: Status: RESOLVED | Noted: 2022-08-09 | Resolved: 2022-11-14

## 2022-11-29 DIAGNOSIS — M50.30 DDD (DEGENERATIVE DISC DISEASE), CERVICAL: Primary | ICD-10-CM

## 2022-12-01 DIAGNOSIS — M50.30 DDD (DEGENERATIVE DISC DISEASE), CERVICAL: Primary | ICD-10-CM

## 2022-12-05 RX ORDER — TRAMADOL HYDROCHLORIDE 50 MG/1
50 TABLET ORAL EVERY 6 HOURS PRN
Qty: 60 TABLET | Refills: 0 | Status: SHIPPED | OUTPATIENT
Start: 2022-12-05 | End: 2023-01-04

## 2022-12-08 ENCOUNTER — TELEPHONE (OUTPATIENT)
Dept: NEUROSURGERY | Facility: CLINIC | Age: 77
End: 2022-12-08
Payer: MEDICARE

## 2022-12-08 DIAGNOSIS — M50.30 DDD (DEGENERATIVE DISC DISEASE), CERVICAL: Primary | ICD-10-CM

## 2022-12-08 NOTE — TELEPHONE ENCOUNTER
I scheduled the patient on 1/5/22 @ 11:00 w/ XR prior at 10am. The patient had injections w/ Dr. Alves we have the records in his chart. He hasn't had PT or any surgery on his neck. He also has not seen any other doctor regarding this problem.

## 2022-12-14 ENCOUNTER — TELEPHONE (OUTPATIENT)
Dept: NEUROSURGERY | Facility: CLINIC | Age: 77
End: 2022-12-14

## 2022-12-14 ENCOUNTER — OFFICE VISIT (OUTPATIENT)
Dept: NEUROSURGERY | Facility: CLINIC | Age: 77
End: 2022-12-14
Payer: MEDICARE

## 2022-12-14 VITALS
HEART RATE: 89 BPM | HEIGHT: 68 IN | DIASTOLIC BLOOD PRESSURE: 86 MMHG | BODY MASS INDEX: 28.04 KG/M2 | WEIGHT: 185 LBS | RESPIRATION RATE: 16 BRPM | SYSTOLIC BLOOD PRESSURE: 116 MMHG

## 2022-12-14 DIAGNOSIS — M47.812 CERVICAL SPONDYLOSIS: Primary | ICD-10-CM

## 2022-12-14 PROCEDURE — 99203 OFFICE O/P NEW LOW 30 MIN: CPT | Mod: ,,, | Performed by: NEUROLOGICAL SURGERY

## 2022-12-14 PROCEDURE — 99203 PR OFFICE/OUTPT VISIT, NEW, LEVL III, 30-44 MIN: ICD-10-PCS | Mod: ,,, | Performed by: NEUROLOGICAL SURGERY

## 2022-12-14 NOTE — PROGRESS NOTES
[facility]  Neurosurgery        Deshaun Lang   [id number]   1945       SUBJECTIVE:     CHIEF COMPLAINT:  neck pain     HPI:  Deshaun Lang is a 77 y.o. male who presents for neurosurgical evaluation.  He complains of pain in the left neck with radiation up behind the left ear.  He describes the pain as aching, sharp, and shocking .  The pain came on suddenly and after working under his vehicle in July , and it has been persistent ever since.  The pain gets better with  nothing , and the pain is worse with  rotation in either direction . He tried epidural steroid injections with no relief.  Patient denies accidents or trauma, denies bowel or bladder symptoms, and denies difficulty with balance.        Review of patient's allergies indicates:   Allergen Reactions    Penicillins Other (See Comments)     Unknown what type of reaction      Statins-hmg-coa reductase inhibitors Other (See Comments)     Unknown what type of reaction       Current Outpatient Medications:     amLODIPine-benazepriL (LOTREL) 10-40 mg per capsule, TAKE ONE CAPSULE ONCE DAILY, Disp: 30 capsule, Rfl: 11    apixaban (ELIQUIS) 5 mg Tab, Take 1 tablet (5 mg total) by mouth 2 (two) times daily., Disp: 180 tablet, Rfl: 0    colestipoL (COLESTID) 1 gram Tab, TAKE 1 TABLET BY MOUTH TWICE A DAY WITH A FULL GLASS OF WATER, Disp: 180 tablet, Rfl: 3    eszopiclone (LUNESTA) 2 MG Tab, Take 1 tablet (2 mg total) by mouth nightly., Disp: 30 tablet, Rfl: 1    hydrOXYchloroQUINE (PLAQUENIL) 200 mg tablet, Take 200 mg by mouth 2 (two) times daily., Disp: , Rfl:     meloxicam (MOBIC) 15 MG tablet, Take 15 mg by mouth once daily., Disp: , Rfl:     tamsulosin (FLOMAX) 0.4 mg Cap, TAKE ONE CAPSULE TWICE DAILY, Disp: 120 capsule, Rfl: 3    traMADoL (ULTRAM) 50 mg tablet, Take 1 tablet (50 mg total) by mouth every 6 (six) hours as needed for Pain., Disp: 60 tablet, Rfl: 0    vitamin D (VITAMIN D3) 1000 units Tab, Take 1,000 Units  by mouth once daily., Disp: , Rfl:     zinc gluconate 50 mg tablet, Take 50 mg by mouth once daily., Disp: , Rfl:     aspirin (ECOTRIN) 81 MG EC tablet, Take 81 mg by mouth Daily., Disp: , Rfl:     predniSONE (DELTASONE) 5 MG tablet, Take 5 mg by mouth once daily., Disp: , Rfl:    Past Medical History:   Diagnosis Date    Acute renal failure syndrome     Carpal tunnel syndrome     Cataracts, bilateral     HLD (hyperlipidemia)     HTN (hypertension)     Unspecified osteoarthritis, unspecified site      Past Surgical History:   Procedure Laterality Date    APPENDECTOMY      CARPAL TUNNEL RELEASE      CATARACT EXTRACTION      COLON SURGERY      EYE SURGERY      JOINT REPLACEMENT  Both knees  2009    NEUROPLASTY  2012    TONSILLECTOMY  As a child    TOTAL KNEE ARTHROPLASTY  2017    TRANSURETHRAL RESECTION OF PROSTATE  2017     Family History   Problem Relation Age of Onset    Arthritis Father     Hearing loss Father     Stroke Father     Alcohol abuse Maternal Uncle      Social History     Tobacco Use    Smoking status: Former     Types: Cigars     Start date: 6/15/1962     Quit date: 6/15/1972     Years since quittin.5    Smokeless tobacco: Never   Substance Use Topics    Alcohol use: Not Currently    Drug use: Never        Review of Systems:    Pertinent items are noted in HPI.        OBJECTIVE:     Vital Signs (Most Recent)  Pulse: 89 (22 1037)  Resp: 16 (22 1037)  BP: 116/86 (22 1037)    Physical Exam:  General:  Pleasant. Well-nourished. Alert. No acute distress.    Head:  Normocephalic, without obvious abnormality, atraumatic    Lungs:  Quiet, non-labored     Neurological:    Oriented to Person, Place, Time   Speech:  normal  Memory, cognition, and affect are appropriate.    Muscle strength against resistance:    Strength  Deltoids Triceps Biceps Wrist Extension Intrinsics Hand    Upper: R 5/5 5/5 5/5 5/5 5/5 5/5    L 5/5 5/5 5/5 5/5 5/5 5/5     Reflexes:    Right Left   Triceps 2+ 2+   Biceps 2+ 2+   Brachioradialis 2+ 2+         Gait:  normal      Musculoskeletal:   Cervical ROM: severely limited rotation, left greater than right with positive reproduction of left neck pain    His symptoms certainly sound like focal atlantoaxial spondylosis and C2 nerve root impingement.  MRI suggests this, but studies are incomplete up to this level.    ASSESSMENT/PLAN:     1. Cervical spondylosis     - CT of the cervical spine up through foramen magnum  - Xrays of the cervical spine with flexion/extension views  - Will call patient with results of imaging and give recommendation on further injections  - Keep follow up as scheduled on 1/5/23    I, Dr. Davon Chapman, personally performed the services described in this documentation. All medical record entries made by the scribe, Tere Gilliam RN, were at my direction and in my presence.  I have reviewed the chart and agree that the record reflects my personal performance and is accurate and complete. Davon Chapman MD.  5:30 PM 12/14/2022       Davon Chapman MD FACS [facility]

## 2022-12-20 ENCOUNTER — TELEPHONE (OUTPATIENT)
Dept: NEUROSURGERY | Facility: CLINIC | Age: 77
End: 2022-12-20
Payer: MEDICARE

## 2022-12-20 NOTE — TELEPHONE ENCOUNTER
I spoke to the patient.  He is concerned that he will only be seeing me when he returns on 1/5/2023.  He is interested in having Dr. Chapman's input in his case.  He is experiencing a great deal of pain and dysfunction with his cervical spine.  I will discuss his situation with Dr. Chapman.

## 2022-12-29 ENCOUNTER — TELEPHONE (OUTPATIENT)
Dept: NEUROSURGERY | Facility: CLINIC | Age: 77
End: 2022-12-29
Payer: MEDICARE

## 2022-12-29 DIAGNOSIS — M47.812 CERVICAL SPONDYLOSIS: Primary | ICD-10-CM

## 2022-12-29 NOTE — TELEPHONE ENCOUNTER
I spoke with Anamaria at Dr. Alves's office.  She said Dr. Alves would do the injection, but the earliest they have available to schedule is 1/24.  I let her know that he was scheduled to follow up here on 1/5, but we will push the appt back if necessary.  She asked that I send records and recommendation to their office, which was done.  I spoke with the patient and let him know the plan.  I told him we could leave the appt on 1/5 for now until he has a definite date for the injection, but would likely have to push it back.

## 2022-12-29 NOTE — TELEPHONE ENCOUNTER
----- Message from Karlee Buenrostro PA-C sent at 12/21/2022  3:15 PM CST -----  Regarding: injection  Dr. Chapman reviewed the patient's CT and x-rays.  Please call Dr. Alves's office to see if he would be able to do a SNB on the left at C3-4.  We want to know if this level is a pain generator.  Please let the patient know if he does.

## 2023-01-05 NOTE — TELEPHONE ENCOUNTER
I spoke to the patient.  He has not heard from Dr. Church's office in regards to an appointment.  I tentatively rescheduled him to 2/13/23 at 1:00  Stephanie CODY yesterday for Anamaria to check the status.  I will check in today if we do not here back.  I reschedule his appointment once we know when the injection is scheduled.  The patient is aware...BH

## 2023-01-09 ENCOUNTER — TELEPHONE (OUTPATIENT)
Dept: NEUROSURGERY | Facility: CLINIC | Age: 78
End: 2023-01-09
Payer: MEDICARE

## 2023-01-17 ENCOUNTER — TELEPHONE (OUTPATIENT)
Dept: INTERNAL MEDICINE | Facility: CLINIC | Age: 78
End: 2023-01-17
Payer: MEDICARE

## 2023-01-17 NOTE — TELEPHONE ENCOUNTER
----- Message from Kelly Prater sent at 1/17/2023  8:42 AM CST -----  PA needed for [facility]

## 2023-01-18 ENCOUNTER — TELEPHONE (OUTPATIENT)
Dept: INTERNAL MEDICINE | Facility: CLINIC | Age: 78
End: 2023-01-18
Payer: MEDICARE

## 2023-01-18 DIAGNOSIS — G47.09 OTHER INSOMNIA: ICD-10-CM

## 2023-01-18 RX ORDER — ESZOPICLONE 2 MG/1
2 TABLET, FILM COATED ORAL NIGHTLY
Qty: 30 TABLET | Refills: 5 | Status: SHIPPED | OUTPATIENT
Start: 2023-01-18 | End: 2023-07-06

## 2023-01-18 NOTE — TELEPHONE ENCOUNTER
PA sent on Cover my meds; waiting on insurance to respond.    Pharmacy also stated that they needed a refill sent. RX sent.

## 2023-02-08 ENCOUNTER — OFFICE VISIT (OUTPATIENT)
Dept: NEUROSURGERY | Facility: CLINIC | Age: 78
End: 2023-02-08
Payer: MEDICARE

## 2023-02-08 VITALS
HEART RATE: 64 BPM | DIASTOLIC BLOOD PRESSURE: 68 MMHG | HEIGHT: 68 IN | BODY MASS INDEX: 28.95 KG/M2 | RESPIRATION RATE: 17 BRPM | WEIGHT: 191 LBS | SYSTOLIC BLOOD PRESSURE: 129 MMHG

## 2023-02-08 DIAGNOSIS — M47.22 CERVICAL SPONDYLOSIS WITH RADICULOPATHY: Primary | ICD-10-CM

## 2023-02-08 DIAGNOSIS — M50.30 DDD (DEGENERATIVE DISC DISEASE), CERVICAL: ICD-10-CM

## 2023-02-08 PROCEDURE — 99214 OFFICE O/P EST MOD 30 MIN: CPT | Mod: ,,, | Performed by: PHYSICIAN ASSISTANT

## 2023-02-08 PROCEDURE — 99214 PR OFFICE/OUTPT VISIT, EST, LEVL IV, 30-39 MIN: ICD-10-PCS | Mod: ,,, | Performed by: PHYSICIAN ASSISTANT

## 2023-02-08 NOTE — PROGRESS NOTES
[facility]  [facility]  Neurosurgery      Deshaun Lang   [id number]   1945       CHIEF COMPLAINT:  Neck pain, left    HPI:  Deshaun Lang is a 77 y.o. male who presents for follow up appointment.  The patient continues with left-sided neck pain especially with left rotation.  He has minimal to no left cervical rotation secondary to pain.  He was initially seen by Dr. Chapman on 12/14/2022.  His symptoms have improved since that time.  Now, he only experiences the sharp pain at the left side of his neck with rotation.  Previously, movement of his arms and walking caused an increase in the neck pain.  The pain travels up through the left side of the neck up to posterior to the left ear.  In addition, his neck pain increases with leaning forward to take a bite of food.  This is likely due to cervical extension.  On 01/24/2023, the patient underwent left C4 diagnostic selective nerve block.  The injection reduced his pain by 50% for 1.5 hours.      His symptoms began in July of 2022.  He was working on his airplane in an awkward position.  The pain began after that activity, and has been persistent.  He denies disturbances in bowel or bladder function.  He does not have difficulties with his balance.      Past Medical History:   Diagnosis Date    Carpal tunnel syndrome     Cataracts, bilateral     HLD (hyperlipidemia)     HTN (hypertension)     Unspecified osteoarthritis, unspecified site        Past Surgical History:   Procedure Laterality Date    APPENDECTOMY  1962    CARPAL TUNNEL RELEASE      CATARACT EXTRACTION      COLON SURGERY  1962    EYE SURGERY  2012    JOINT REPLACEMENT  Both knees  2009    NEUROPLASTY  03/22/2012    TONSILLECTOMY  As a child    TOTAL KNEE ARTHROPLASTY  08/22/2017    TRANSURETHRAL RESECTION OF PROSTATE  08/30/2017       Family History   Problem Relation Age of Onset    Arthritis Father     Hearing loss Father     Stroke Father     Alcohol abuse  Maternal Uncle        Social History     Socioeconomic History    Marital status:    Tobacco Use    Smoking status: Former     Types: Cigars     Start date: 6/15/1962     Quit date: 6/15/1972     Years since quittin.7    Smokeless tobacco: Never   Substance and Sexual Activity    Alcohol use: Not Currently    Drug use: Never    Sexual activity: Not Currently       Review of patient's allergies indicates:   Allergen Reactions    Penicillins Other (See Comments)     Unknown what type of reaction      Statins-hmg-coa reductase inhibitors Other (See Comments)     Unknown what type of reaction        Medication List with Changes/Refills   Current Medications    AMLODIPINE-BENAZEPRIL (LOTREL) 10-40 MG PER CAPSULE    TAKE ONE CAPSULE ONCE DAILY    APIXABAN (ELIQUIS) 5 MG TAB    Take 1 tablet (5 mg total) by mouth 2 (two) times daily.    ASPIRIN (ECOTRIN) 81 MG EC TABLET    Take 81 mg by mouth Daily.    COLESTIPOL (COLESTID) 1 GRAM TAB    TAKE 1 TABLET BY MOUTH TWICE A DAY WITH A FULL GLASS OF WATER    ESZOPICLONE (LUNESTA) 2 MG TAB    Take 1 tablet (2 mg total) by mouth nightly.    HYDROXYCHLOROQUINE (PLAQUENIL) 200 MG TABLET    Take 200 mg by mouth 2 (two) times daily.    MELOXICAM (MOBIC) 15 MG TABLET    Take 15 mg by mouth once daily.    PREDNISONE (DELTASONE) 5 MG TABLET    Take 5 mg by mouth once daily.    TAMSULOSIN (FLOMAX) 0.4 MG CAP    TAKE ONE CAPSULE TWICE DAILY    TRAMADOL (ULTRAM) 50 MG TABLET    TAKE ONE TABLET EVERY 6 HOURS. AS NEEDED FOR PAIN    VITAMIN D (VITAMIN D3) 1000 UNITS TAB    Take 1,000 Units by mouth once daily.    ZINC GLUCONATE 50 MG TABLET    Take 50 mg by mouth once daily.        ROS:    Review of Systems   Constitutional:  Positive for activity change. Negative for chills and fever.   HENT:  Positive for ear pain. Negative for nosebleeds and sore throat.    Eyes:  Negative for pain and visual disturbance.   Respiratory:  Negative for cough, chest tightness and shortness of  "breath.    Cardiovascular:  Negative for chest pain.   Gastrointestinal:  Negative for diarrhea, nausea and vomiting.   Genitourinary:  Negative for difficulty urinating, dysuria and hematuria.   Musculoskeletal:  Positive for neck pain. Negative for gait problem and myalgias.   Skin:  Negative for rash.   Neurological:  Positive for headaches. Negative for dizziness, facial asymmetry, weakness and numbness.   Psychiatric/Behavioral:  Negative for confusion and sleep disturbance. The patient is not nervous/anxious.      Physical Examination:    Vital Signs:  /68 (BP Location: Other (Comment), Patient Position: Sitting)   Pulse 64   Resp 17   Ht 5' 8" (1.727 m)   Wt 86.6 kg (191 lb)   BMI 29.04 kg/m²        General:  Pleasant. Well-nourished. Well-groomed.    Lungs:  Breathing is quiet, non-labored     Musculoskeletal:  Cervical ROM:  Severely restricted with extension and left rotation, moderately limited with right rotation.      Neurological:    Oriented to Person, Place, Time   Speech:  normal  Memory, cognition, and affect are appropriate.  Muscle strength against resistance:   Strength   Deltoids Triceps Biceps Wrist Extension Intrinsics Hand    Upper: R 5/5 5/5 5/5 5/5 5/5 5/5     L 5/5 5/5 5/5 5/5 5/5 5/5   Reflexes:    Right Left   Triceps 2+ 2+   Biceps 2+ 2+   Brachioradialis 2+ 2+   Gait is normal.      Imaging:  CT of the cervical spine was obtained on 12/15/2022.  This study shows severe left foraminal stenosis at C3-4 due to uncinate and facet hypertrophy.      ASSESSMENT/PLAN:     1. Cervical spondylosis with radiculopathy        2. DDD (degenerative disc disease), cervical          The patient was seen and examined by Dr. Chapman.  Options were discussed at length with the patient and his wife.  Risk, benefits, and possible complications were discussed at length.  Dr. Chapman discussed left anterior C3-4 foraminotomy.  Including C1-2 fusion was discussed.  Dr. Chapman and the patient " prefer to move forward with only left C3-4 foraminotomy.      A total of 31 minutes was spent face-to-face with the patient during this encounter.  Over half of that time was spent on counseling and coordination of care.  Additional time was used to reviewed the patient's chart including cervical MRI, x-rays, and CT imaging and reports, and work on office note.      Karlee Buenrostro PA-C

## 2023-02-13 ENCOUNTER — CLINICAL SUPPORT (OUTPATIENT)
Dept: NEUROSURGERY | Facility: CLINIC | Age: 78
End: 2023-02-13
Payer: MEDICARE

## 2023-02-13 DIAGNOSIS — M47.22 CERVICAL SPONDYLOSIS WITH RADICULOPATHY: Primary | ICD-10-CM

## 2023-02-13 DIAGNOSIS — Z01.818 PREOPERATIVE TESTING: ICD-10-CM

## 2023-02-13 DIAGNOSIS — I10 HYPERTENSION, UNSPECIFIED TYPE: ICD-10-CM

## 2023-02-13 DIAGNOSIS — N17.9 ACUTE RENAL FAILURE, UNSPECIFIED ACUTE RENAL FAILURE TYPE: ICD-10-CM

## 2023-02-14 ENCOUNTER — TELEPHONE (OUTPATIENT)
Dept: NEUROSURGERY | Facility: CLINIC | Age: 78
End: 2023-02-14
Payer: MEDICARE

## 2023-02-14 NOTE — TELEPHONE ENCOUNTER
He is concerned about the consents and documentation in history that is not accurate.    He will come in Friday at 10 to see me and sign consents.  Is there a way to change the tramadol to say 0.5 in the directions as opposed to comments.  He would like that changed.

## 2023-02-14 NOTE — PATIENT INSTRUCTIONS
-STOP Eliquis on 2/21/2023.   -Nothing to eat or drink after midnight the night before surgery, EXCEPT for Lotrel the morning of surgery with a small sip of water.   -Clean front of neck with hibiclens (or Dial soap if unable to find hibiclens) the night before and morning of surgery.

## 2023-02-14 NOTE — PROGRESS NOTES
[facility]  Neurosurgery        Deshaun Lang   [id number]   1945       SUBJECTIVE:     History of Present Illness:  Patient is a 77 y.o. male who presents for a preoperative visit.    He continues with left sided neck pain, especially with left cervical rotation.  Previously, movement of arms and walking caused increased pain.  Now he experiences sharp pain at the left side of his neck only with rotation that extends through neck and behind left ear.  He also has increased neck pain with leaning forward to take a bite of food, probably due to extension.     On 1/24/2023, he had a left C4 SNB diagnostic reduced neck pain by 50% for 1.5 hrs.          Past Medical History:   Diagnosis Date    Acute renal failure syndrome     Carpal tunnel syndrome     Cataracts, bilateral     HLD (hyperlipidemia)     HTN (hypertension)     Unspecified osteoarthritis, unspecified site       Past Surgical History:   Procedure Laterality Date    APPENDECTOMY  1962    CARPAL TUNNEL RELEASE      CATARACT EXTRACTION      COLON SURGERY  1962    EYE SURGERY  2012    JOINT REPLACEMENT  Both knees  2009    NEUROPLASTY  03/22/2012    TONSILLECTOMY  As a child    TOTAL KNEE ARTHROPLASTY  08/22/2017    TRANSURETHRAL RESECTION OF PROSTATE  08/30/2017      Current Outpatient Medications   Medication Sig Dispense Refill    amLODIPine-benazepriL (LOTREL) 10-40 mg per capsule TAKE ONE CAPSULE ONCE DAILY 30 capsule 11    apixaban (ELIQUIS) 5 mg Tab Take 1 tablet (5 mg total) by mouth 2 (two) times daily. 180 tablet 0    aspirin (ECOTRIN) 81 MG EC tablet Take 81 mg by mouth Daily.      colestipoL (COLESTID) 1 gram Tab TAKE 1 TABLET BY MOUTH TWICE A DAY WITH A FULL GLASS OF WATER 180 tablet 3    eszopiclone (LUNESTA) 2 MG Tab Take 1 tablet (2 mg total) by mouth nightly. 30 tablet 5    hydrOXYchloroQUINE (PLAQUENIL) 200 mg tablet Take 200 mg by mouth 2 (two) times daily.      meloxicam (MOBIC) 15 MG tablet Take 15 mg by mouth  once daily.      predniSONE (DELTASONE) 5 MG tablet Take 5 mg by mouth once daily.      tamsulosin (FLOMAX) 0.4 mg Cap TAKE ONE CAPSULE TWICE DAILY 120 capsule 3    traMADoL (ULTRAM) 50 mg tablet TAKE ONE TABLET EVERY 6 HOURS. AS NEEDED FOR PAIN (Patient taking differently: Patient takes 1/2 every six hours as needed) 60 tablet 0    vitamin D (VITAMIN D3) 1000 units Tab Take 1,000 Units by mouth once daily.      zinc gluconate 50 mg tablet Take 50 mg by mouth once daily.       No current facility-administered medications for this visit.      Review of patient's allergies indicates:   Allergen Reactions    Penicillins Other (See Comments)     Unknown what type of reaction      Statins-hmg-coa reductase inhibitors Other (See Comments)     Unknown what type of reaction      Social History     Tobacco Use    Smoking status: Former     Types: Cigars     Start date: 6/15/1962     Quit date: 6/15/1972     Years since quittin.7    Smokeless tobacco: Never   Substance Use Topics    Alcohol use: Not Currently      Family History   Problem Relation Age of Onset    Arthritis Father     Hearing loss Father     Stroke Father     Alcohol abuse Maternal Uncle        Review of Systems:    Respiratory: negative  Cardiovascular: negative for chest pain  Gastrointestinal: negative for abdominal pain  Behavioral/Psych: negative      OBJECTIVE:     Vital Signs     There is no height or weight on file to calculate BMI.    General:  healthy, alert, no distress, cooperative    Head:  Normocephalic, without obvious abnormality    Lungs:   Breathing is quiet, non-lablored    Neurological:    Oriented to Person, Place, Time   Speech:  normal  Memory, cognition, and affect are appropriate.  Extraocular movements are intact.  Movements of facial expression are intact and symmetric.  Motor Strength: Moves all extremities spontaneously with good tone.  No abnormal movements seen.    Gait is normal.        ASSESSMENT/PLAN:     1. Cervical  spondylosis with radiculopathy    2. Hypertension, unspecified type    3. Acute renal failure, unspecified acute renal failure type    4. Preoperative testing     All questions were answered.  He is in agreement with proceeding with a left C3-4 foraminotomy as planned. It is scheduled for 2/24/2023.     Tere Vazquez LPN

## 2023-02-17 ENCOUNTER — OFFICE VISIT (OUTPATIENT)
Dept: NEUROSURGERY | Facility: CLINIC | Age: 78
End: 2023-02-17
Payer: MEDICARE

## 2023-02-17 VITALS
TEMPERATURE: 98 F | BODY MASS INDEX: 28.19 KG/M2 | HEART RATE: 97 BPM | RESPIRATION RATE: 20 BRPM | HEIGHT: 68 IN | DIASTOLIC BLOOD PRESSURE: 50 MMHG | WEIGHT: 186 LBS | SYSTOLIC BLOOD PRESSURE: 111 MMHG

## 2023-02-17 DIAGNOSIS — M47.22 CERVICAL SPONDYLOSIS WITH RADICULOPATHY: Primary | ICD-10-CM

## 2023-02-17 PROCEDURE — 99024 PR POST-OP FOLLOW-UP VISIT: ICD-10-PCS | Mod: POP,,, | Performed by: PHYSICIAN ASSISTANT

## 2023-02-17 PROCEDURE — 99024 POSTOP FOLLOW-UP VISIT: CPT | Mod: POP,,, | Performed by: PHYSICIAN ASSISTANT

## 2023-02-17 NOTE — H&P (VIEW-ONLY)
[facility]  History & Physical  Neurosurgery      Deshaun Lang   [id number]   1945       CHIEF COMPLAINT:  Neck pain    HPI:  Deshaun Lang is a 77 y.o. male who presents for follow up appointment.  The patient was scheduled for left C3-4 anterior foraminotomy on 2023.  He continues with left-sided neck pain especially with left rotation and with extension.  He had additional questions regarding the procedure.  He presents today to discuss further.      Past Medical History:   Diagnosis Date    Carpal tunnel syndrome     Cataracts, bilateral     HLD (hyperlipidemia)     HTN (hypertension)     Paroxysmal atrial fibrillation     Rheumatoid arthritis, unspecified     Unspecified osteoarthritis, unspecified site        Past Surgical History:   Procedure Laterality Date    APPENDECTOMY      CARPAL TUNNEL RELEASE      CATARACT EXTRACTION      COLON SURGERY      EYE SURGERY      JOINT REPLACEMENT  Both knees  2009    NEUROPLASTY  2012    TONSILLECTOMY  As a child    TOTAL KNEE ARTHROPLASTY  2017    TRANSURETHRAL RESECTION OF PROSTATE  2017       Family History   Problem Relation Age of Onset    Arthritis Father     Hearing loss Father     Stroke Father     Alcohol abuse Maternal Uncle        Social History     Socioeconomic History    Marital status:    Tobacco Use    Smoking status: Former     Types: Cigars     Start date: 6/15/1962     Quit date: 6/15/1972     Years since quittin.7    Smokeless tobacco: Never   Substance and Sexual Activity    Alcohol use: Not Currently    Drug use: Never    Sexual activity: Not Currently       Review of patient's allergies indicates:   Allergen Reactions    Penicillins Other (See Comments)     Unknown what type of reaction      Statins-hmg-coa reductase inhibitors Other (See Comments)     Unknown what type of reaction        Medication List with Changes/Refills   Current Medications     AMLODIPINE-BENAZEPRIL (LOTREL) 10-40 MG PER CAPSULE    TAKE ONE CAPSULE ONCE DAILY    APIXABAN (ELIQUIS) 5 MG TAB    Take 1 tablet (5 mg total) by mouth 2 (two) times daily.    ASPIRIN (ECOTRIN) 81 MG EC TABLET    Take 81 mg by mouth Daily.    COLESTIPOL (COLESTID) 1 GRAM TAB    TAKE 1 TABLET BY MOUTH TWICE A DAY WITH A FULL GLASS OF WATER    ESZOPICLONE (LUNESTA) 2 MG TAB    Take 1 tablet (2 mg total) by mouth nightly.    HYDROXYCHLOROQUINE (PLAQUENIL) 200 MG TABLET    Take 200 mg by mouth 2 (two) times daily.    MELOXICAM (MOBIC) 15 MG TABLET    Take 15 mg by mouth once daily.    PREDNISONE (DELTASONE) 5 MG TABLET    Take 5 mg by mouth once daily.    TAMSULOSIN (FLOMAX) 0.4 MG CAP    TAKE ONE CAPSULE TWICE DAILY    TRAMADOL (ULTRAM) 25 MG TABLET    Take by mouth every 6 (six) hours as needed for Pain.    TRAMADOL (ULTRAM) 50 MG TABLET    TAKE ONE TABLET EVERY 6 HOURS. AS NEEDED FOR PAIN    VITAMIN D (VITAMIN D3) 1000 UNITS TAB    Take 1,000 Units by mouth once daily.    ZINC GLUCONATE 50 MG TABLET    Take 50 mg by mouth once daily.        ROS:     Review of Systems   Constitutional:  Positive for activity change. Negative for chills and fever.   HENT:  Positive for ear pain. Negative for nosebleeds and sore throat.    Eyes:  Negative for pain and visual disturbance.   Respiratory:  Negative for cough, chest tightness and shortness of breath.    Cardiovascular:  Negative for chest pain.   Gastrointestinal:  Negative for diarrhea, nausea and vomiting.   Genitourinary:  Negative for difficulty urinating, dysuria and hematuria.   Musculoskeletal:  Positive for neck pain. Negative for gait problem and myalgias.   Skin:  Negative for rash.   Neurological:  Positive for headaches. Negative for dizziness, facial asymmetry, weakness and numbness.   Psychiatric/Behavioral:  Negative for confusion and sleep disturbance. The patient is not nervous/anxious.        Physical Examination:    Vital Signs:  BP (!) 111/50 (BP  "Location: Other (Comment), Patient Position: Sitting)   Pulse 97   Temp 97.6 °F (36.4 °C)   Resp 20   Ht 5' 8" (1.727 m)   Wt 84.4 kg (186 lb)   BMI 28.28 kg/m²      General:  Pleasant. Well-nourished. Well-groomed.    Lungs:  Breathing is quiet, non-labored     Musculoskeletal:  Cervical ROM:  Severely restricted with extension and left rotation, moderately limited with right rotation.    Neurological:    Oriented to Person, Place, Time   Speech:  normal  Memory, cognition, and affect are appropriate.  Gait is normal.  Coordination is normal      ASSESSMENT/PLAN:     1. Cervical spondylosis with radiculopathy          Consents were signed at this time.  Risks, benefits, and possible complications were discussed.  All the patient's questions were answered.  Plan is for left C3-4 anterior foraminotomy.  This is tentatively scheduled for 03/24/2023.        Karlee Buenrostro PA-C    "

## 2023-02-17 NOTE — PROGRESS NOTES
[facility]  History & Physical  Neurosurgery      Deshaun Lang   [id number]   1945       CHIEF COMPLAINT:  Neck pain    HPI:  Deshaun Lang is a 77 y.o. male who presents for follow up appointment.  The patient was scheduled for left C3-4 anterior foraminotomy on 2023.  He continues with left-sided neck pain especially with left rotation and with extension.  He had additional questions regarding the procedure.  He presents today to discuss further.      Past Medical History:   Diagnosis Date    Carpal tunnel syndrome     Cataracts, bilateral     HLD (hyperlipidemia)     HTN (hypertension)     Paroxysmal atrial fibrillation     Rheumatoid arthritis, unspecified     Unspecified osteoarthritis, unspecified site        Past Surgical History:   Procedure Laterality Date    APPENDECTOMY      CARPAL TUNNEL RELEASE      CATARACT EXTRACTION      COLON SURGERY      EYE SURGERY      JOINT REPLACEMENT  Both knees  2009    NEUROPLASTY  2012    TONSILLECTOMY  As a child    TOTAL KNEE ARTHROPLASTY  2017    TRANSURETHRAL RESECTION OF PROSTATE  2017       Family History   Problem Relation Age of Onset    Arthritis Father     Hearing loss Father     Stroke Father     Alcohol abuse Maternal Uncle        Social History     Socioeconomic History    Marital status:    Tobacco Use    Smoking status: Former     Types: Cigars     Start date: 6/15/1962     Quit date: 6/15/1972     Years since quittin.7    Smokeless tobacco: Never   Substance and Sexual Activity    Alcohol use: Not Currently    Drug use: Never    Sexual activity: Not Currently       Review of patient's allergies indicates:   Allergen Reactions    Penicillins Other (See Comments)     Unknown what type of reaction      Statins-hmg-coa reductase inhibitors Other (See Comments)     Unknown what type of reaction        Medication List with Changes/Refills   Current Medications     AMLODIPINE-BENAZEPRIL (LOTREL) 10-40 MG PER CAPSULE    TAKE ONE CAPSULE ONCE DAILY    APIXABAN (ELIQUIS) 5 MG TAB    Take 1 tablet (5 mg total) by mouth 2 (two) times daily.    ASPIRIN (ECOTRIN) 81 MG EC TABLET    Take 81 mg by mouth Daily.    COLESTIPOL (COLESTID) 1 GRAM TAB    TAKE 1 TABLET BY MOUTH TWICE A DAY WITH A FULL GLASS OF WATER    ESZOPICLONE (LUNESTA) 2 MG TAB    Take 1 tablet (2 mg total) by mouth nightly.    HYDROXYCHLOROQUINE (PLAQUENIL) 200 MG TABLET    Take 200 mg by mouth 2 (two) times daily.    MELOXICAM (MOBIC) 15 MG TABLET    Take 15 mg by mouth once daily.    PREDNISONE (DELTASONE) 5 MG TABLET    Take 5 mg by mouth once daily.    TAMSULOSIN (FLOMAX) 0.4 MG CAP    TAKE ONE CAPSULE TWICE DAILY    TRAMADOL (ULTRAM) 25 MG TABLET    Take by mouth every 6 (six) hours as needed for Pain.    TRAMADOL (ULTRAM) 50 MG TABLET    TAKE ONE TABLET EVERY 6 HOURS. AS NEEDED FOR PAIN    VITAMIN D (VITAMIN D3) 1000 UNITS TAB    Take 1,000 Units by mouth once daily.    ZINC GLUCONATE 50 MG TABLET    Take 50 mg by mouth once daily.        ROS:     Review of Systems   Constitutional:  Positive for activity change. Negative for chills and fever.   HENT:  Positive for ear pain. Negative for nosebleeds and sore throat.    Eyes:  Negative for pain and visual disturbance.   Respiratory:  Negative for cough, chest tightness and shortness of breath.    Cardiovascular:  Negative for chest pain.   Gastrointestinal:  Negative for diarrhea, nausea and vomiting.   Genitourinary:  Negative for difficulty urinating, dysuria and hematuria.   Musculoskeletal:  Positive for neck pain. Negative for gait problem and myalgias.   Skin:  Negative for rash.   Neurological:  Positive for headaches. Negative for dizziness, facial asymmetry, weakness and numbness.   Psychiatric/Behavioral:  Negative for confusion and sleep disturbance. The patient is not nervous/anxious.        Physical Examination:    Vital Signs:  BP (!) 111/50 (BP  "Location: Other (Comment), Patient Position: Sitting)   Pulse 97   Temp 97.6 °F (36.4 °C)   Resp 20   Ht 5' 8" (1.727 m)   Wt 84.4 kg (186 lb)   BMI 28.28 kg/m²      General:  Pleasant. Well-nourished. Well-groomed.    Lungs:  Breathing is quiet, non-labored     Musculoskeletal:  Cervical ROM:  Severely restricted with extension and left rotation, moderately limited with right rotation.    Neurological:    Oriented to Person, Place, Time   Speech:  normal  Memory, cognition, and affect are appropriate.  Gait is normal.  Coordination is normal      ASSESSMENT/PLAN:     1. Cervical spondylosis with radiculopathy          Consents were signed at this time.  Risks, benefits, and possible complications were discussed.  All the patient's questions were answered.  Plan is for left C3-4 anterior foraminotomy.  This is tentatively scheduled for 03/24/2023.        Karlee Buenrostro PA-C    "

## 2023-02-22 ENCOUNTER — HOSPITAL ENCOUNTER (OUTPATIENT)
Dept: RADIOLOGY | Facility: HOSPITAL | Age: 78
Discharge: HOME | End: 2023-02-22
Attending: NEUROLOGICAL SURGERY
Payer: MEDICARE

## 2023-02-22 ENCOUNTER — ANESTHESIA EVENT (OUTPATIENT)
Dept: SURGERY | Facility: HOSPITAL | Age: 78
End: 2023-02-22
Payer: MEDICARE

## 2023-02-22 DIAGNOSIS — I10 HYPERTENSION, UNSPECIFIED TYPE: ICD-10-CM

## 2023-02-22 DIAGNOSIS — Z01.818 PREOPERATIVE TESTING: ICD-10-CM

## 2023-02-22 DIAGNOSIS — M47.22 CERVICAL SPONDYLOSIS WITH RADICULOPATHY: ICD-10-CM

## 2023-02-22 PROCEDURE — 71046 X-RAY EXAM CHEST 2 VIEWS: CPT | Mod: TC

## 2023-02-23 ENCOUNTER — TELEPHONE (OUTPATIENT)
Dept: NEUROSURGERY | Facility: CLINIC | Age: 78
End: 2023-02-23
Payer: MEDICARE

## 2023-02-23 DIAGNOSIS — M47.22 CERVICAL SPONDYLOSIS WITH RADICULOPATHY: Primary | ICD-10-CM

## 2023-02-23 RX ORDER — MUPIROCIN 20 MG/G
1 OINTMENT TOPICAL 2 TIMES DAILY
Status: CANCELLED | OUTPATIENT
Start: 2023-02-23 | End: 2023-02-24

## 2023-02-23 NOTE — TELEPHONE ENCOUNTER
----- Message from Tere Vazquez LPN sent at 2/14/2023  1:05 PM CST -----  Regarding: RE: CARDIAC CLEARANCE- AA    ----- Message -----  From: Tere Vazquez LPN  Sent: 2/14/2023   1:04 PM CST  To: Tere Vazquez LPN  Subject: CARDIAC CLEARANCE- AA                            Faxed clearance request to Dr. Wei. Patient is on Eliquis.

## 2023-02-23 NOTE — ANESTHESIA PREPROCEDURE EVALUATION
02/23/2023  Deshaun Lang is a 77 y.o., male continues with left-sided neck pain especially with left rotation and with extension. Radiculopathy.  For C3-4 ant foraminotomy.    Pre-operative evaluation for Procedure(s) (LRB):  FORAMINOTOMY, SPINE (Left)    Deshaun Lang is a 77 y.o. male     Patient Active Problem List   Diagnosis    Piriformis syndrome of left side    Degenerative disc disease at L5-S1 level    Osteoarthritis    HTN (hypertension)    HLD (hyperlipidemia)    Benign prostatic hyperplasia       Review of patient's allergies indicates:   Allergen Reactions    Penicillins Other (See Comments)     Unknown what type of reaction      Statins-hmg-coa reductase inhibitors Other (See Comments)     Unknown what type of reaction       No current facility-administered medications on file prior to encounter.     Current Outpatient Medications on File Prior to Encounter   Medication Sig Dispense Refill    amLODIPine-benazepriL (LOTREL) 10-40 mg per capsule TAKE ONE CAPSULE ONCE DAILY 30 capsule 11    apixaban (ELIQUIS) 5 mg Tab Take 1 tablet (5 mg total) by mouth 2 (two) times daily. 180 tablet 0    aspirin (ECOTRIN) 81 MG EC tablet Take 81 mg by mouth Daily.      colestipoL (COLESTID) 1 gram Tab TAKE 1 TABLET BY MOUTH TWICE A DAY WITH A FULL GLASS OF WATER 180 tablet 3    eszopiclone (LUNESTA) 2 MG Tab Take 1 tablet (2 mg total) by mouth nightly. 30 tablet 5    hydrOXYchloroQUINE (PLAQUENIL) 200 mg tablet Take 200 mg by mouth 2 (two) times daily.      meloxicam (MOBIC) 15 MG tablet Take 15 mg by mouth once daily.      predniSONE (DELTASONE) 5 MG tablet Take 5 mg by mouth once daily.      tamsulosin (FLOMAX) 0.4 mg Cap TAKE ONE CAPSULE TWICE DAILY 120 capsule 3    tramadol (ULTRAM) 25 mg tablet Take by mouth every 6 (six) hours as needed for Pain.      traMADoL  (ULTRAM) 50 mg tablet TAKE ONE TABLET EVERY 6 HOURS. AS NEEDED FOR PAIN (Patient not taking: Reported on 2/14/2023) 60 tablet 0    vitamin D (VITAMIN D3) 1000 units Tab Take 1,000 Units by mouth once daily.      zinc gluconate 50 mg tablet Take 50 mg by mouth once daily.         Past Surgical History:   Procedure Laterality Date    APPENDECTOMY  1962    CARPAL TUNNEL RELEASE      CATARACT EXTRACTION      COLON SURGERY  1962    EYE SURGERY  2012    JOINT REPLACEMENT  Both knees  2009    NEUROPLASTY  03/22/2012    TONSILLECTOMY  As a child    TOTAL KNEE ARTHROPLASTY  08/22/2017    TRANSURETHRAL RESECTION OF PROSTATE  08/30/2017       CBC:   Recent Labs     02/22/23  1017   WBC 7.6   RBC 5.06   HGB 15.4   HCT 47.4      MCV 93.7   MCH 30.4   MCHC 32.5*       CMP: eGFR: WNL.  Recent Labs     02/22/23  1017      K 4.6   CO2 29   BUN 22.6   CREATININE 0.92   CALCIUM 9.0   ALBUMIN 3.9   ALKPHOS 99   ALT 24   AST 22   BILITOT 0.6       INR  No results for input(s): PT, INR, PROTIME, APTT in the last 72 hours.        Diagnostic Studies:  CXR 2/22/2023: NAPD    EKG 2/22/2023: Afib=79. NSIVCB      2D Echo :  No results found for this or any previous visit.     CT Neck12/15/2022: Grade 1 anterolisthesis at C2-C3 and C7-T1.  Advanced disc disease from C3-C4 through C7-T1, with multilevel advanced facet arthropathy and intervertebral foraminal stenosis as above.  Abnormal right angle of the mandible, consider dedicated mandible imaging.  Advanced atherosclerosis in the carotid bulbs, ultrasound correlation recommended.    Pre-op Assessment    I have reviewed the Patient Summary Reports.     I have reviewed the Nursing Notes. I have reviewed the NPO Status.   I have reviewed the Medications.   Prednisone    Review of Systems  Anesthesia Hx:  No problems with previous Anesthesia  History of prior surgery of interest to airway management or planning: Previous anesthesia: General AP:; TKR:; Colon Sx:; with  general anesthesia.  Airway issues documented on chart review include GETA  Denies Family Hx of Anesthesia complications.   Denies Personal Hx of Anesthesia complications.   Social:  Former Smoker Cigar smoker x 10 yrs. Quit 1972.   Hematology/Oncology:  Hematology Normal   Oncology Normal     EENT/Dental:EENT/Dental Normal   Cardiovascular:   Exercise tolerance: good Denies Pacemaker. Hypertension  Denies MI.   Denies CABG/stent. Dysrhythmias atrial fibrillation  Denies Angina. hyperlipidemia ECG has been reviewed. PAF on Eliquis.  Carotid Art Disease:11/8/2018 (3rd US): B <50%. Stable since 2016. Carotoid Artery Disease, bilateral , Left stenosis is <50% ( 2018)% , Right stenosis is  <50%(2018)% Hypertension  Disorder of Cardiac Rhythm, Atrial Fibrillation, Paroxysmal Atrial Fibrillation, controlled ventricular response  Disorder of Cardiac Conduction, Intraventricular Conduct Defect, Non-Specific Intraventricular Conduction Delay    Pulmonary:  Pulmonary Normal  Denies COPD.  Denies Asthma.    Renal/:  Renal/ Normal  TURP   Hepatic/GI:  Hepatic/GI Normal  Denies GERD.    Musculoskeletal:   Arthritis  Grade 1 anterolisthesis at C2-C3 and C7-T1.    RA and OA: Plaquenil and Prednisone 5mg daily x 4-5 yrs. Held Pred x 3 days.   Phx R jaw Fx injury healed by itself, no jaw fusion. Spine Disorders: lumbar and cervical Degenerative disease    Neurological:   Neuromuscular Disease,   Peripheral Neuropathy    Endocrine:  Endocrine Normal    Dermatological:  Skin Normal    Psych:  Psychiatric Normal           Physical Exam  General: Well nourished, Cooperative, Alert and Oriented    Airway:  Mallampati: II / I  Mouth Opening: Normal  Tongue: Normal  Neck ROM: Extension Decreased, Left Lateral Motion Decreased  Can extend up approx 35 degrees. L Lat marked limitation. R lat better ROM.  Dental:  Intact  Px denies any loose teeth.  Musculoskeletal:Bilat ulnar perfu checked with [facility].      Anesthesia Plan  Type of  Anesthesia, risks & benefits discussed:    Anesthesia Type: Gen ETT  Intra-op Monitoring Plan: Standard ASA Monitors  Post Op Pain Control Plan: multimodal analgesia  Induction:  IV  Airway Plan: Direct  Informed Consent: Informed consent signed with the Patient and all parties understand the risks and agree with anesthesia plan.  All questions answered.   ASA Score: 3  Day of Surgery Review of History & Physical: H&P Update referred to the surgeon/provider.I have interviewed and examined the patient. I have reviewed the patient's H&P dated: 2/17/2023.   Anesthesia Plan Notes: Maintain neutral. Videoscope. TIVA    Ready For Surgery From Anesthesia Perspective.     .

## 2023-02-24 ENCOUNTER — ANESTHESIA (OUTPATIENT)
Dept: SURGERY | Facility: HOSPITAL | Age: 78
End: 2023-02-24
Payer: MEDICARE

## 2023-02-24 ENCOUNTER — HOSPITAL ENCOUNTER (OUTPATIENT)
Facility: HOSPITAL | Age: 78
Discharge: HOME | End: 2023-02-24
Attending: NEUROLOGICAL SURGERY | Admitting: NEUROLOGICAL SURGERY
Payer: MEDICARE

## 2023-02-24 DIAGNOSIS — M47.22 CERVICAL SPONDYLOSIS WITH RADICULOPATHY: ICD-10-CM

## 2023-02-24 PROCEDURE — 63600175 PHARM REV CODE 636 W HCPCS: Performed by: NEUROLOGICAL SURGERY

## 2023-02-24 PROCEDURE — 63075 PR DISK SURG,ANTER,CERVICAL,SINGLE LVL: ICD-10-PCS | Mod: ,,, | Performed by: NEUROLOGICAL SURGERY

## 2023-02-24 PROCEDURE — 25000003 PHARM REV CODE 250: Performed by: NURSE ANESTHETIST, CERTIFIED REGISTERED

## 2023-02-24 PROCEDURE — 36000711: Performed by: NEUROLOGICAL SURGERY

## 2023-02-24 PROCEDURE — 27201423 OPTIME MED/SURG SUP & DEVICES STERILE SUPPLY: Performed by: NEUROLOGICAL SURGERY

## 2023-02-24 PROCEDURE — 37000008 HC ANESTHESIA 1ST 15 MINUTES: Performed by: NEUROLOGICAL SURGERY

## 2023-02-24 PROCEDURE — 25000003 PHARM REV CODE 250: Performed by: NEUROLOGICAL SURGERY

## 2023-02-24 PROCEDURE — 71000015 HC POSTOP RECOV 1ST HR: Performed by: NEUROLOGICAL SURGERY

## 2023-02-24 PROCEDURE — 71000016 HC POSTOP RECOV ADDL HR: Performed by: NEUROLOGICAL SURGERY

## 2023-02-24 PROCEDURE — 25000003 PHARM REV CODE 250: Performed by: ANESTHESIOLOGY

## 2023-02-24 PROCEDURE — 71000033 HC RECOVERY, INTIAL HOUR: Performed by: NEUROLOGICAL SURGERY

## 2023-02-24 PROCEDURE — 63600175 PHARM REV CODE 636 W HCPCS: Performed by: ANESTHESIOLOGY

## 2023-02-24 PROCEDURE — 37000009 HC ANESTHESIA EA ADD 15 MINS: Performed by: NEUROLOGICAL SURGERY

## 2023-02-24 PROCEDURE — 36000710: Performed by: NEUROLOGICAL SURGERY

## 2023-02-24 PROCEDURE — 63600175 PHARM REV CODE 636 W HCPCS: Performed by: NURSE ANESTHETIST, CERTIFIED REGISTERED

## 2023-02-24 PROCEDURE — 63075 NECK SPINE DISK SURGERY: CPT | Mod: ,,, | Performed by: NEUROLOGICAL SURGERY

## 2023-02-24 RX ORDER — TRAMADOL HYDROCHLORIDE 50 MG/1
TABLET ORAL
Qty: 40 TABLET | Refills: 0 | Status: SHIPPED | OUTPATIENT
Start: 2023-02-24 | End: 2023-03-28

## 2023-02-24 RX ORDER — LIDOCAINE HYDROCHLORIDE 10 MG/ML
0.5 INJECTION, SOLUTION EPIDURAL; INFILTRATION; INTRACAUDAL; PERINEURAL ONCE
Status: DISCONTINUED | OUTPATIENT
Start: 2023-02-24 | End: 2023-02-24 | Stop reason: HOSPADM

## 2023-02-24 RX ORDER — LIDOCAINE HYDROCHLORIDE 20 MG/ML
INJECTION, SOLUTION EPIDURAL; INFILTRATION; INTRACAUDAL; PERINEURAL
Status: DISCONTINUED | OUTPATIENT
Start: 2023-02-24 | End: 2023-02-24

## 2023-02-24 RX ORDER — METHOCARBAMOL 750 MG/1
750 TABLET, FILM COATED ORAL EVERY 6 HOURS PRN
Qty: 40 TABLET | Refills: 2 | Status: SHIPPED | OUTPATIENT
Start: 2023-02-24 | End: 2023-03-29

## 2023-02-24 RX ORDER — METHOCARBAMOL 750 MG/1
750 TABLET, FILM COATED ORAL ONCE
Status: COMPLETED | OUTPATIENT
Start: 2023-02-24 | End: 2023-02-24

## 2023-02-24 RX ORDER — PHENYLEPHRINE HCL IN 0.9% NACL 1 MG/10 ML
SYRINGE (ML) INTRAVENOUS
Status: DISCONTINUED | OUTPATIENT
Start: 2023-02-24 | End: 2023-02-24

## 2023-02-24 RX ORDER — FAMOTIDINE 20 MG/1
40 TABLET, FILM COATED ORAL ONCE
Status: COMPLETED | OUTPATIENT
Start: 2023-02-24 | End: 2023-02-24

## 2023-02-24 RX ORDER — DEXAMETHASONE SODIUM PHOSPHATE 4 MG/ML
INJECTION, SOLUTION INTRA-ARTICULAR; INTRALESIONAL; INTRAMUSCULAR; INTRAVENOUS; SOFT TISSUE
Status: DISCONTINUED | OUTPATIENT
Start: 2023-02-24 | End: 2023-02-24

## 2023-02-24 RX ORDER — HYDROMORPHONE HYDROCHLORIDE 2 MG/ML
0.4 INJECTION, SOLUTION INTRAMUSCULAR; INTRAVENOUS; SUBCUTANEOUS EVERY 10 MIN PRN
Status: DISCONTINUED | OUTPATIENT
Start: 2023-02-24 | End: 2023-02-24 | Stop reason: HOSPADM

## 2023-02-24 RX ORDER — ACETAMINOPHEN 325 MG/1
650 TABLET ORAL
Status: COMPLETED | OUTPATIENT
Start: 2023-02-24 | End: 2023-02-24

## 2023-02-24 RX ORDER — BUPIVACAINE HYDROCHLORIDE 5 MG/ML
INJECTION, SOLUTION EPIDURAL; INTRACAUDAL
Status: DISCONTINUED | OUTPATIENT
Start: 2023-02-24 | End: 2023-02-24 | Stop reason: HOSPADM

## 2023-02-24 RX ORDER — MEPERIDINE HYDROCHLORIDE 25 MG/ML
12.5 INJECTION INTRAMUSCULAR; INTRAVENOUS; SUBCUTANEOUS EVERY 10 MIN PRN
Status: DISCONTINUED | OUTPATIENT
Start: 2023-02-24 | End: 2023-02-24 | Stop reason: HOSPADM

## 2023-02-24 RX ORDER — ONDANSETRON HYDROCHLORIDE 2 MG/ML
INJECTION, SOLUTION INTRAMUSCULAR; INTRAVENOUS
Status: DISCONTINUED | OUTPATIENT
Start: 2023-02-24 | End: 2023-02-24

## 2023-02-24 RX ORDER — TRAMADOL HYDROCHLORIDE 50 MG/1
50 TABLET ORAL EVERY 6 HOURS PRN
Status: DISCONTINUED | OUTPATIENT
Start: 2023-02-24 | End: 2023-02-24 | Stop reason: HOSPADM

## 2023-02-24 RX ORDER — SODIUM CHLORIDE, SODIUM LACTATE, POTASSIUM CHLORIDE, CALCIUM CHLORIDE 600; 310; 30; 20 MG/100ML; MG/100ML; MG/100ML; MG/100ML
INJECTION, SOLUTION INTRAVENOUS CONTINUOUS
Status: ACTIVE | OUTPATIENT
Start: 2023-02-24 | End: 2023-02-24

## 2023-02-24 RX ORDER — GLYCOPYRROLATE 0.2 MG/ML
INJECTION INTRAMUSCULAR; INTRAVENOUS
Status: DISCONTINUED | OUTPATIENT
Start: 2023-02-24 | End: 2023-02-24

## 2023-02-24 RX ORDER — CALCIUM CHLORIDE INJECTION 100 MG/ML
INJECTION, SOLUTION INTRAVENOUS
Status: DISCONTINUED | OUTPATIENT
Start: 2023-02-24 | End: 2023-02-24

## 2023-02-24 RX ORDER — DEXMEDETOMIDINE HYDROCHLORIDE 100 UG/ML
INJECTION, SOLUTION INTRAVENOUS
Status: DISCONTINUED | OUTPATIENT
Start: 2023-02-24 | End: 2023-02-24

## 2023-02-24 RX ORDER — ONDANSETRON 2 MG/ML
4 INJECTION INTRAMUSCULAR; INTRAVENOUS ONCE AS NEEDED
Status: DISCONTINUED | OUTPATIENT
Start: 2023-02-24 | End: 2023-02-24 | Stop reason: HOSPADM

## 2023-02-24 RX ORDER — VANCOMYCIN HYDROCHLORIDE 1 G/20ML
INJECTION, POWDER, LYOPHILIZED, FOR SOLUTION INTRAVENOUS
Status: DISCONTINUED | OUTPATIENT
Start: 2023-02-24 | End: 2023-02-24 | Stop reason: HOSPADM

## 2023-02-24 RX ORDER — ROCURONIUM BROMIDE 10 MG/ML
INJECTION, SOLUTION INTRAVENOUS
Status: DISCONTINUED | OUTPATIENT
Start: 2023-02-24 | End: 2023-02-24

## 2023-02-24 RX ORDER — EPHEDRINE SULFATE 50 MG/ML
INJECTION, SOLUTION INTRAVENOUS
Status: DISCONTINUED | OUTPATIENT
Start: 2023-02-24 | End: 2023-02-24

## 2023-02-24 RX ORDER — CLINDAMYCIN PHOSPHATE 900 MG/50ML
900 INJECTION, SOLUTION INTRAVENOUS
Status: COMPLETED | OUTPATIENT
Start: 2023-02-24 | End: 2023-02-24

## 2023-02-24 RX ORDER — FENTANYL CITRATE 50 UG/ML
INJECTION, SOLUTION INTRAMUSCULAR; INTRAVENOUS
Status: DISCONTINUED | OUTPATIENT
Start: 2023-02-24 | End: 2023-02-24

## 2023-02-24 RX ORDER — PROPOFOL 10 MG/ML
VIAL (ML) INTRAVENOUS
Status: DISCONTINUED | OUTPATIENT
Start: 2023-02-24 | End: 2023-02-24

## 2023-02-24 RX ORDER — SODIUM CHLORIDE, SODIUM LACTATE, POTASSIUM CHLORIDE, CALCIUM CHLORIDE 600; 310; 30; 20 MG/100ML; MG/100ML; MG/100ML; MG/100ML
INJECTION, SOLUTION INTRAVENOUS CONTINUOUS
Status: DISCONTINUED | OUTPATIENT
Start: 2023-02-24 | End: 2023-02-24 | Stop reason: HOSPADM

## 2023-02-24 RX ADMIN — Medication 100 MCG: at 08:02

## 2023-02-24 RX ADMIN — EPHEDRINE SULFATE 20 MG: 50 INJECTION INTRAVENOUS at 08:02

## 2023-02-24 RX ADMIN — METHOCARBAMOL 750 MG: 750 TABLET ORAL at 12:02

## 2023-02-24 RX ADMIN — SODIUM CHLORIDE, SODIUM GLUCONATE, SODIUM ACETATE, POTASSIUM CHLORIDE AND MAGNESIUM CHLORIDE: 526; 502; 368; 37; 30 INJECTION, SOLUTION INTRAVENOUS at 07:02

## 2023-02-24 RX ADMIN — LIDOCAINE HYDROCHLORIDE 80 MG: 20 INJECTION, SOLUTION EPIDURAL; INFILTRATION; INTRACAUDAL; PERINEURAL at 07:02

## 2023-02-24 RX ADMIN — TRAMADOL HYDROCHLORIDE 50 MG: 50 TABLET, COATED ORAL at 10:02

## 2023-02-24 RX ADMIN — PROPOFOL 150 MG: 10 INJECTION, EMULSION INTRAVENOUS at 07:02

## 2023-02-24 RX ADMIN — EPHEDRINE SULFATE 10 MG: 50 INJECTION INTRAVENOUS at 09:02

## 2023-02-24 RX ADMIN — DEXMEDETOMIDINE 6 MCG: 200 INJECTION, SOLUTION INTRAVENOUS at 09:02

## 2023-02-24 RX ADMIN — Medication 200 MCG: at 07:02

## 2023-02-24 RX ADMIN — ROCURONIUM BROMIDE 30 MG: 10 SOLUTION INTRAVENOUS at 07:02

## 2023-02-24 RX ADMIN — ONDANSETRON HYDROCHLORIDE 4 MG: 2 INJECTION, SOLUTION INTRAMUSCULAR; INTRAVENOUS at 08:02

## 2023-02-24 RX ADMIN — DEXMEDETOMIDINE 8 MCG: 200 INJECTION, SOLUTION INTRAVENOUS at 07:02

## 2023-02-24 RX ADMIN — GLYCOPYRROLATE 0.2 MG: 0.2 INJECTION INTRAMUSCULAR; INTRAVENOUS at 07:02

## 2023-02-24 RX ADMIN — GLYCOPYRROLATE 0.2 MG: 0.2 INJECTION INTRAMUSCULAR; INTRAVENOUS at 08:02

## 2023-02-24 RX ADMIN — FENTANYL CITRATE 50 MCG: 50 INJECTION, SOLUTION INTRAMUSCULAR; INTRAVENOUS at 07:02

## 2023-02-24 RX ADMIN — CALCIUM CHLORIDE INJECTION 1 G: 100 INJECTION, SOLUTION INTRAVENOUS at 07:02

## 2023-02-24 RX ADMIN — Medication 300 MCG: at 07:02

## 2023-02-24 RX ADMIN — DEXAMETHASONE SODIUM PHOSPHATE 8 MG: 4 INJECTION, SOLUTION INTRA-ARTICULAR; INTRALESIONAL; INTRAMUSCULAR; INTRAVENOUS; SOFT TISSUE at 08:02

## 2023-02-24 RX ADMIN — HYDROMORPHONE HYDROCHLORIDE 0.4 MG: 2 INJECTION, SOLUTION INTRAMUSCULAR; INTRAVENOUS; SUBCUTANEOUS at 09:02

## 2023-02-24 RX ADMIN — CLINDAMYCIN PHOSPHATE 900 MG: 900 INJECTION, SOLUTION INTRAVENOUS at 07:02

## 2023-02-24 RX ADMIN — FAMOTIDINE 40 MG: 20 TABLET, FILM COATED ORAL at 05:02

## 2023-02-24 RX ADMIN — ACETAMINOPHEN 650 MG: 325 TABLET, FILM COATED ORAL at 05:02

## 2023-02-24 RX ADMIN — ROCURONIUM BROMIDE 10 MG: 10 SOLUTION INTRAVENOUS at 08:02

## 2023-02-24 RX ADMIN — SUGAMMADEX 200 MG: 100 INJECTION, SOLUTION INTRAVENOUS at 09:02

## 2023-02-24 NOTE — TRANSFER OF CARE
"Anesthesia Transfer of Care Note    Patient: Deshaun Lang Jr.    Procedure(s) Performed: Procedure(s) (LRB):  FORAMINOTOMY, SPINE (Left)    Patient location: PACU    Anesthesia Type: general    Transport from OR: Transported from OR on room air with adequate spontaneous ventilation    Post pain: adequate analgesia    Post assessment: no apparent anesthetic complications and tolerated procedure well    Post vital signs: stable    Level of consciousness: awake, alert, oriented and responds to stimulation    Nausea/Vomiting: no nausea/vomiting    Complications: none    Transfer of care protocol was followed      Last vitals:   Visit Vitals  /75   Pulse 86   Temp 36.6 °C (97.9 °F) (Oral)   Resp 17   Ht 5' 8.6" (1.742 m)   Wt 85.3 kg (188 lb 0.8 oz)   SpO2 97%   BMI 28.59 kg/m²     "

## 2023-02-24 NOTE — ANESTHESIA POSTPROCEDURE EVALUATION
Anesthesia Post Evaluation    Patient: Deshaun Lang JrForrest    Procedure(s) Performed: Procedure(s) (LRB):  FORAMINOTOMY, SPINE (Left)    Final Anesthesia Type: general      Patient location during evaluation: PACU  Patient participation: Yes- Able to Participate  Level of consciousness: awake and alert, awake and oriented  Post-procedure vital signs: reviewed and stable  Pain management: adequate  Airway patency: patent    PONV status at discharge: No PONV  Anesthetic complications: no      Cardiovascular status: blood pressure returned to baseline, hemodynamically stable and stable  Respiratory status: unassisted, spontaneous ventilation and room air  Hydration status: euvolemic  Follow-up not needed.          Vitals Value Taken Time   /71 02/24/23 1118   Temp 36.6 °C (97.9 °F) 02/24/23 0947   Pulse 96 02/24/23 1122   Resp 18 02/24/23 1043   SpO2 85 % 02/24/23 1122         Event Time   Out of Recovery 10:15:00         Pain/Renata Score: Pain Rating Prior to Med Admin: 10 (2/24/2023 10:43 AM)  Renata Score: 10 (2/24/2023 12:20 PM)  Modified Renata Score: 20 (2/24/2023 11:32 AM)

## 2023-02-24 NOTE — BRIEF OP NOTE
[facility]  Brief Operative Note    Surgery Date: 2/24/2023     Surgeon(s) and Role:     * Davon Chapman MD - Primary    Assisting Surgeon: None    Pre-op Diagnosis:  Cervical spondylosis with radiculopathy [M47.22]    Post-op Diagnosis:  Post-Op Diagnosis Codes:     * Cervical spondylosis with radiculopathy [M47.22]    Procedure(s) (LRB):  FORAMINOTOMY, SPINE (Left)  L C3-4 anterior foraminotomy    Anesthesia: General    Operative Findings: osteophyte removed with excellent nerve root decompression    Estimated Blood Loss: * No values recorded between 2/24/2023  8:00 AM and 2/24/2023  9:39 AM *         Specimens:   Specimen (24h ago, onward)      None              Discharge Note    OUTCOME: Patient tolerated treatment/procedure well without complication and is now ready for discharge.    DISPOSITION: Home or Self Care    FINAL DIAGNOSIS:  <principal problem not specified>    FOLLOWUP: In clinic Patient has appt    DISCHARGE INSTRUCTIONS:  No discharge procedures on file.

## 2023-02-24 NOTE — DISCHARGE INSTRUCTIONS
-NO driving and NO alcohol consumption for 24 hours and while taking narcotic pain medications.    -Keep incisions clean and dry for 48 hours. OK to shower afterwards. Do not tub bathe or submerge incision under water.    -NO heavy lifting. DO not lift objects greater than 10lbs. Use caution when lifting, bending, pulling, pushing, etc.    -Monitor site for infection: redness, swelling, drainage/pus/foul odor, fever, chills.    -Report to your nearest ER AND/OR notify your provider if you experience any SUDDEN/SEVERE chest/abdominal pain, profound weakness, trouble breathing, uncontrolled pain/bleeding.

## 2023-02-27 VITALS
WEIGHT: 188.06 LBS | TEMPERATURE: 98 F | HEIGHT: 69 IN | BODY MASS INDEX: 27.85 KG/M2 | RESPIRATION RATE: 18 BRPM | OXYGEN SATURATION: 85 % | DIASTOLIC BLOOD PRESSURE: 71 MMHG | SYSTOLIC BLOOD PRESSURE: 123 MMHG | HEART RATE: 96 BPM

## 2023-03-06 ENCOUNTER — OFFICE VISIT (OUTPATIENT)
Dept: NEUROSURGERY | Facility: CLINIC | Age: 78
End: 2023-03-06
Payer: MEDICARE

## 2023-03-06 ENCOUNTER — TELEPHONE (OUTPATIENT)
Dept: INTERNAL MEDICINE | Facility: CLINIC | Age: 78
End: 2023-03-06
Payer: MEDICARE

## 2023-03-06 VITALS
WEIGHT: 188 LBS | HEART RATE: 105 BPM | SYSTOLIC BLOOD PRESSURE: 112 MMHG | RESPIRATION RATE: 20 BRPM | BODY MASS INDEX: 28.49 KG/M2 | DIASTOLIC BLOOD PRESSURE: 69 MMHG | HEIGHT: 68 IN

## 2023-03-06 DIAGNOSIS — M47.22 CERVICAL SPONDYLOSIS WITH RADICULOPATHY: Primary | ICD-10-CM

## 2023-03-06 PROCEDURE — 99024 PR POST-OP FOLLOW-UP VISIT: ICD-10-PCS | Mod: POP,,, | Performed by: PHYSICIAN ASSISTANT

## 2023-03-06 PROCEDURE — 99024 POSTOP FOLLOW-UP VISIT: CPT | Mod: POP,,, | Performed by: PHYSICIAN ASSISTANT

## 2023-03-06 RX ORDER — PREDNISONE 5 MG/1
TABLET ORAL DAILY
COMMUNITY
Start: 2023-03-01

## 2023-03-06 NOTE — TELEPHONE ENCOUNTER
----- Message from JOSE Mcclain sent at 3/6/2023 10:50 AM CST -----  This was just filled on 2/24 - patient recently had surgery with Dr. Chapman.   ----- Message -----  From: Kelly Prater  Sent: 3/6/2023  10:32 AM CST  To: JOSE Mcclain    Pt called asking for Refill on Tramadol, stated Dr. Lee usually fills it monthly for him.    Pt stated it was last filled on 2.2.23

## 2023-03-06 NOTE — PROGRESS NOTES
[facility]  History & Physical  Neurosurgery      Deshaun Lang Jr.   [id number]   1945       CHIEF COMPLAINT:  Neck pain    HPI:  Deshaun Lang Jr. is a 77 y.o. male who presents for postoperative follow up appointment.  He is 10 days status post left C3-4 anterior foraminotomy.  Overall, he is doing very well.  He has seen improvement in his range of motion.  He no longer has the sharp stabbing pain at the left side of his neck with left rotation.  His neck pain is well managed.  He presents today for follow-up with his wife.  We will just do his follow-up appointment at this time.      Past Medical History:   Diagnosis Date    Carpal tunnel syndrome     Cataracts, bilateral     HLD (hyperlipidemia)     HTN (hypertension)     Paroxysmal atrial fibrillation     Rheumatoid arthritis, unspecified     Unspecified osteoarthritis, unspecified site        Past Surgical History:   Procedure Laterality Date    APPENDECTOMY      CARPAL TUNNEL RELEASE      CATARACT EXTRACTION      COLON SURGERY      EYE SURGERY      FORAMINOTOMY Left 2023    C3-4 anterior foraminotomy.  Dr. Chapman    JOINT REPLACEMENT  Both knees  2009    NEUROPLASTY  2012    TONSILLECTOMY  As a child    TOTAL KNEE ARTHROPLASTY  2017    TRANSURETHRAL RESECTION OF PROSTATE  2017       Family History   Problem Relation Age of Onset    Arthritis Father     Hearing loss Father     Stroke Father     Alcohol abuse Maternal Uncle        Social History     Socioeconomic History    Marital status:    Tobacco Use    Smoking status: Former     Types: Cigars     Start date: 6/15/1962     Quit date: 6/15/1972     Years since quittin.7    Smokeless tobacco: Never   Substance and Sexual Activity    Alcohol use: Not Currently    Drug use: Never    Sexual activity: Not Currently       Review of patient's allergies indicates:   Allergen Reactions    Penicillins Other (See Comments)      Unknown what type of reaction      Statins-hmg-coa reductase inhibitors Other (See Comments)     Unknown what type of reaction        Medication List with Changes/Refills   Current Medications    AMLODIPINE-BENAZEPRIL (LOTREL) 10-40 MG PER CAPSULE    TAKE ONE CAPSULE ONCE DAILY    APIXABAN (ELIQUIS) 5 MG TAB    Take 1 tablet (5 mg total) by mouth 2 (two) times daily.    COLESTIPOL (COLESTID) 1 GRAM TAB    TAKE 1 TABLET BY MOUTH TWICE A DAY WITH A FULL GLASS OF WATER    ESZOPICLONE (LUNESTA) 2 MG TAB    Take 1 tablet (2 mg total) by mouth nightly.    HYDROXYCHLOROQUINE (PLAQUENIL) 200 MG TABLET    Take 200 mg by mouth 2 (two) times daily.    METHOCARBAMOL (ROBAXIN) 750 MG TAB    Take 1 tablet (750 mg total) by mouth every 6 (six) hours as needed (muscle spasm).    PREDNISONE (DELTASONE) 5 MG TABLET    once daily.    TAMSULOSIN (FLOMAX) 0.4 MG CAP    TAKE ONE CAPSULE TWICE DAILY    TRAMADOL (ULTRAM) 25 MG TABLET    Take by mouth every 6 (six) hours as needed for Pain.    TRAMADOL (ULTRAM) 50 MG TABLET    TAKE ONE TABLET EVERY 6 HOURS. AS NEEDED FOR PAIN    VITAMIN D (VITAMIN D3) 1000 UNITS TAB    Take 1,000 Units by mouth once daily.    ZINC GLUCONATE 50 MG TABLET    Take 50 mg by mouth once daily.        ROS:    Review of Systems   Constitutional:  Negative for chills and fever.   HENT:  Negative for nosebleeds and sore throat.    Eyes:  Negative for pain and visual disturbance.   Respiratory:  Negative for cough, chest tightness and shortness of breath.    Cardiovascular:  Negative for chest pain.   Gastrointestinal:  Negative for diarrhea, nausea and vomiting.   Genitourinary:  Negative for difficulty urinating, dysuria and hematuria.   Musculoskeletal:  Positive for neck pain and neck stiffness. Negative for gait problem and myalgias.   Skin:  Negative for rash.   Neurological:  Negative for dizziness, facial asymmetry and headaches.   Psychiatric/Behavioral:  Negative for confusion and sleep disturbance. The  "patient is not nervous/anxious.        Physical Examination:    Vital Signs:  /69 (BP Location: Other (Comment), Patient Position: Sitting)   Pulse 105   Resp 20   Ht 5' 8" (1.727 m)   Wt 85.3 kg (188 lb)   BMI 28.59 kg/m²      General:  Pleasant. Well-nourished. Well-groomed.    Lungs:  Breathing is quiet, non-labored     Musculoskeletal:  Cervical ROM:  Moderately limited with bilateral rotation, severely limited with extension.  Cervical incision is well healed.    Neurological:    Oriented to Person, Place, Time   He is moving all extremities well.    Gait is normal.  Coordination is normal      ASSESSMENT/PLAN:     1. Cervical spondylosis with radiculopathy          Overall, the patient is doing well.  We discussed a gradual increase in his level of activity.  He voiced understanding.  He will return for follow-up on an as-needed basis.        Karlee Buenrostro PA-C    "

## 2023-03-07 NOTE — OP NOTE
DATE OF OPERATION:  February 24, 2023    PREOPERATIVE DIAGNOSIS:  1.  Left C3-4 spondylosis and foraminal stenosis with radiculopathy    POSTOPERATIVE DIAGNOSIS:  1.  Left C3-4 spondylosis and foraminal stenosis with radiculopathy    SURGEON:  Davon Chapman M.D.   ASSISTANT: ROD    PROCEDURE:  1.  Left C3-4 anterior foraminotomy  2.  Microdissection for spinal procedure    ANESTHESIA:  General endotracheal    BLOOD LOSS:  50 cc    SPECIMEN(s):  None    DRAIN:  None    COMPLICATIONS:  None    HISTORY:  The patient is a 77 year old gentleman with left-sided neck pain especially with left rotation and with extension.  Imaging studies showed spondylosis and foraminal stenosis at C3-4 on the left.  Options were discussed and, after conservative management failed, surgery was elected.  The patient understood and accepted the nature of this surgery as well as its attendant risks.    FINDINGS:  As expected there was severe disc spondylosis and foraminal stenosis at the index level.  There was significant spondylosis and nerve root impingement.  There was excellent nerve root decompression at the completion of the procedure.  The patient tolerated the procedure well.    PROCEDURE IN DETAIL:  After endotracheal intubation and induction of general anesthesia, the patient's head was placed on a doughnut and a roll was placed under the shoulders.  Intraoperative neuro monitoring electrodes were put into place and both EMG and SSEP monitoring were carried out continuously throughout the procedure.  The neck was prepped and draped in the usual fashion.  The patient received intravenous antibiotics prior to the start of the procedure.  Intraoperative C-arm fluoroscopy was used to localize the incision.  An incision was marked out at the appropriate location and infiltrated with local anesthetic containing epinephrine.  The incision was carried down through the skin and subcutaneous tissues with a knife.  Unipolar cautery was used  to incise the platysma.  Circumferential subplatysmal dissection was carried out to allow better retraction.  Then the fascial sheath was divided sharply and sharp and blunt dissection were carried out medial to the sternocleidomastoid and carotid sheath and lateral to the trachea and esophagus down to the level of the prevertebral fascia unilaterally over the index level.  After confirming correct interspace localization, the longus colli musculature was undercut unilaterally at the index level. The self-retaining retractor system was put into place at the index level with out any significant medial retraction of the midline structures.  The operating microscope was then brought into place.  The high-speed drill was used to drill down the lateral portion of the inferior uncinate process.  Minimal discectomy was performed just to be able to access the foramen.  Further dissection was carried down to the proximal foramen and further uncinate osteophytes were removed.  A complete foraminotomy was carried out and hooks were used to ensure no further proximal impingement.    Bleeding points were controlled temporarily with Gelfoam. The wound was closed with 3-0 Vicryl for the platysma and a running 4-0 monocryl suture was used for the subcuticular layer..  Dermabond skin glue was used to cover the incision line.  The patient was taken to the post-anesthetic care unit in satisfactorily condition with correct sponge and needle counts.

## 2023-03-27 RX ORDER — TRAMADOL HYDROCHLORIDE 50 MG/1
TABLET ORAL
Qty: 60 TABLET | Refills: 0 | OUTPATIENT
Start: 2023-03-27

## 2023-03-27 NOTE — TELEPHONE ENCOUNTER
----- Message from Lenard Miranda MD sent at 3/27/2023  1:43 PM CDT -----  He hasn't been seen in over 7 months  Dr Chapman also sent RX last month  ----- Message -----  From: Dustin Krueger MA  Sent: 3/27/2023  12:10 PM CDT  To: Lenard Miranda MD      ----- Message -----  From: Chandan Worley  Sent: 3/27/2023   9:49 AM CDT  To: Mick Weinberg Staff    Pt was advised by pharmacy that his tramadol was denied .. wants to know why?  He stated he has sx and the pain pills help with the arthritis in his hand

## 2023-03-27 NOTE — TELEPHONE ENCOUNTER
Spoke to pt and he stated that the Tramadol 50 was set by Dr. Chapman because he just had Surgery last month. He stated that you have been writing the RX for Tramadol 25 mg for years.

## 2023-03-28 RX ORDER — TRAMADOL HYDROCHLORIDE 50 MG/1
50 TABLET ORAL EVERY 6 HOURS PRN
Qty: 60 TABLET | Refills: 0 | Status: SHIPPED | OUTPATIENT
Start: 2023-03-28 | End: 2023-05-01

## 2023-03-29 ENCOUNTER — OFFICE VISIT (OUTPATIENT)
Dept: INTERNAL MEDICINE | Facility: CLINIC | Age: 78
End: 2023-03-29
Payer: MEDICARE

## 2023-03-29 VITALS
RESPIRATION RATE: 16 BRPM | DIASTOLIC BLOOD PRESSURE: 76 MMHG | WEIGHT: 189 LBS | HEART RATE: 104 BPM | SYSTOLIC BLOOD PRESSURE: 130 MMHG | HEIGHT: 67 IN | TEMPERATURE: 97 F | BODY MASS INDEX: 29.66 KG/M2 | OXYGEN SATURATION: 96 %

## 2023-03-29 DIAGNOSIS — G89.29 OTHER CHRONIC PAIN: ICD-10-CM

## 2023-03-29 DIAGNOSIS — G31.84 MILD COGNITIVE IMPAIRMENT: ICD-10-CM

## 2023-03-29 PROCEDURE — 99213 PR OFFICE/OUTPT VISIT, EST, LEVL III, 20-29 MIN: ICD-10-PCS | Mod: ,,, | Performed by: INTERNAL MEDICINE

## 2023-03-29 PROCEDURE — 99213 OFFICE O/P EST LOW 20 MIN: CPT | Mod: ,,, | Performed by: INTERNAL MEDICINE

## 2023-03-29 RX ORDER — SULFASALAZINE 500 MG/1
TABLET ORAL
COMMUNITY
Start: 2023-03-28 | End: 2024-02-14

## 2023-03-29 NOTE — ASSESSMENT & PLAN NOTE
Patient reports some difficulty and pain his bills just takes him a longer period of time.  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  Did not wish to move forward with any medication management today will discuss further at next visit.

## 2023-03-29 NOTE — ASSESSMENT & PLAN NOTE
Rx tramadol sent to pharmacy advised patient to keep a Q six-month appointments.  Next visit will be for wellness.

## 2023-03-29 NOTE — PROGRESS NOTES
Subjective:      Patient ID: Deshaun Lang Jr. is a 77 y.o. male.    Chief Complaint: Follow-up      HPI:  77 year-old  male presents to clinic today for 6 month revisit  Past medical history of rheumatoid arthritis treated by Dr. Gely Herman with plaquenil   Dr. Qiu for optho  Also medical history of hypertension, HLD, insmonia and BPH.  He is up-to-date with screening colonoscopy as well as PSA checks- Dr Obando  Patient is intolerant to statins and takes cholestyramine but does not take it as prescribed.  He works on as a restoration  on airplanes    S/P right knee replacement doing excellent so far he said both knees done in both shoulders replaced  TURP with Dallin Obando- last visit was a year ago,  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, flies once a week  Reports that paying his bills is taking him a longer period of time, not forgetting familiar places or people  He denies any current call sign, no difficulty with words, no expressive difficulties, blood pressure at goal recently started on sulfasalazine in addition to his Plaquenil for his rheumatoid.      Past Medical History:  Past Medical History:   Diagnosis Date    Carpal tunnel syndrome     Cataracts, bilateral     HLD (hyperlipidemia)     HTN (hypertension)     Paroxysmal atrial fibrillation     Rheumatoid arthritis, unspecified     Unspecified osteoarthritis, unspecified site      Past Surgical History:   Procedure Laterality Date    APPENDECTOMY  1962    CARPAL TUNNEL RELEASE      CATARACT EXTRACTION      COLON SURGERY  1962    EYE SURGERY  2012    FORAMINOTOMY Left 02/24/2023    C3-4 anterior foraminotomy.  Dr. Chapman    JOINT REPLACEMENT  Both knees  2009    NEUROPLASTY  03/22/2012    TONSILLECTOMY  As a child    TOTAL KNEE ARTHROPLASTY  08/22/2017    TRANSURETHRAL RESECTION OF PROSTATE  08/30/2017     Review of patient's allergies indicates:   Allergen Reactions     Penicillins Other (See Comments)     Unknown what type of reaction      Statins-hmg-coa reductase inhibitors Other (See Comments)     Unknown what type of reaction     Current Outpatient Medications on File Prior to Visit   Medication Sig Dispense Refill    amLODIPine-benazepriL (LOTREL) 10-40 mg per capsule TAKE ONE CAPSULE ONCE DAILY 30 capsule 11    apixaban (ELIQUIS) 5 mg Tab Take 1 tablet (5 mg total) by mouth 2 (two) times daily. 180 tablet 0    colestipoL (COLESTID) 1 gram Tab TAKE 1 TABLET BY MOUTH TWICE A DAY WITH A FULL GLASS OF WATER 180 tablet 3    eszopiclone (LUNESTA) 2 MG Tab Take 1 tablet (2 mg total) by mouth nightly. 30 tablet 5    hydrOXYchloroQUINE (PLAQUENIL) 200 mg tablet Take 200 mg by mouth 2 (two) times daily.      predniSONE (DELTASONE) 5 MG tablet once daily.      sulfaSALAzine (AZULFIDINE) 500 mg Tab Take by mouth.      tamsulosin (FLOMAX) 0.4 mg Cap TAKE ONE CAPSULE TWICE DAILY 120 capsule 3    traMADoL (ULTRAM) 50 mg tablet Take 1 tablet (50 mg total) by mouth every 6 (six) hours as needed for Pain. 60 tablet 0    vitamin D (VITAMIN D3) 1000 units Tab Take 1,000 Units by mouth once daily.      zinc gluconate 50 mg tablet Take 50 mg by mouth once daily.      [DISCONTINUED] methocarbamoL (ROBAXIN) 750 MG Tab Take 1 tablet (750 mg total) by mouth every 6 (six) hours as needed (muscle spasm). (Patient not taking: Reported on 3/6/2023) 40 tablet 2     No current facility-administered medications on file prior to visit.     Social History     Socioeconomic History    Marital status:    Tobacco Use    Smoking status: Former     Types: Cigars     Start date: 6/15/1962     Quit date: 6/15/1972     Years since quittin.8    Smokeless tobacco: Never   Substance and Sexual Activity    Alcohol use: Not Currently    Drug use: Never    Sexual activity: Not Currently     Family History   Problem Relation Age of Onset    Arthritis Father     Hearing loss Father     Stroke Father     Alcohol  "abuse Maternal Uncle        Review of Systems  A comprehensive review of systems was performed and was negative with exception of what is documented above.     Objective:   /76 (BP Location: Left arm, Patient Position: Sitting, BP Method: Medium (Manual))   Pulse 104   Temp 97.2 °F (36.2 °C) (Temporal)   Resp 16   Ht 5' 7" (1.702 m)   Wt 85.7 kg (189 lb)   SpO2 96%   BMI 29.60 kg/m²   Physical Exam  General : Alert and oriented, No acute distress, afebrile.  Eye : PERRLA. EOMI. Normal conjunctiva, Sclerae are nonicteric.   Respiratory : Respirations are non-labored and clear to auscultation bilaterally. Symmetrical air entry bilaterally, no crackles, no wheezes, no rhonchi. No cyanosis, no clubbing.  Cardiovascular : Normal rate, Regular rhythm. No murmurs, rubs, or gallops. Pulses are 2+ throughout. No JVD. No Edema.  Neurologic : Alert, Oriented  Psychiatric : Cooperative, Appropriate mood & affect.   Assessment/ Plan:   1. Other chronic pain  Assessment & Plan:  Rx tramadol sent to pharmacy advised patient to keep a Q six-month appointments.  Next visit will be for wellness.      2. Mild cognitive impairment  Assessment & Plan:  Patient reports some difficulty and pain his bills just takes him a longer period of time.  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  Did not wish to move forward with any medication management today will discuss further at next visit.               Follow up in about 6 months (around 9/29/2023) for NURSE PRACTITIONER, WELLNESS, with labs prior to visit.    "

## 2023-05-01 DIAGNOSIS — M19.90 OSTEOARTHRITIS, UNSPECIFIED OSTEOARTHRITIS TYPE, UNSPECIFIED SITE: Primary | ICD-10-CM

## 2023-05-01 RX ORDER — TRAMADOL HYDROCHLORIDE 50 MG/1
TABLET ORAL
Qty: 60 TABLET | Refills: 0 | Status: SHIPPED | OUTPATIENT
Start: 2023-05-01 | End: 2023-05-25 | Stop reason: ALTCHOICE

## 2023-05-25 ENCOUNTER — TELEPHONE (OUTPATIENT)
Dept: INTERNAL MEDICINE | Facility: CLINIC | Age: 78
End: 2023-05-25
Payer: MEDICARE

## 2023-05-25 ENCOUNTER — NURSE TRIAGE (OUTPATIENT)
Dept: ADMINISTRATIVE | Facility: CLINIC | Age: 78
End: 2023-05-25
Payer: MEDICARE

## 2023-05-25 ENCOUNTER — HOSPITAL ENCOUNTER (EMERGENCY)
Facility: HOSPITAL | Age: 78
Discharge: HOME | End: 2023-05-25
Attending: EMERGENCY MEDICINE
Payer: MEDICARE

## 2023-05-25 ENCOUNTER — OFFICE VISIT (OUTPATIENT)
Dept: INTERNAL MEDICINE | Facility: CLINIC | Age: 78
End: 2023-05-25
Payer: MEDICARE

## 2023-05-25 VITALS
TEMPERATURE: 98 F | SYSTOLIC BLOOD PRESSURE: 136 MMHG | HEIGHT: 67 IN | OXYGEN SATURATION: 95 % | DIASTOLIC BLOOD PRESSURE: 78 MMHG | RESPIRATION RATE: 16 BRPM | HEART RATE: 82 BPM | BODY MASS INDEX: 29.19 KG/M2 | WEIGHT: 186 LBS

## 2023-05-25 VITALS
DIASTOLIC BLOOD PRESSURE: 77 MMHG | SYSTOLIC BLOOD PRESSURE: 122 MMHG | RESPIRATION RATE: 22 BRPM | TEMPERATURE: 98 F | OXYGEN SATURATION: 95 % | HEART RATE: 96 BPM

## 2023-05-25 DIAGNOSIS — N20.0 NEPHROLITHIASIS: ICD-10-CM

## 2023-05-25 DIAGNOSIS — N20.1 CALCULUS OF DISTAL RIGHT URETER: Primary | ICD-10-CM

## 2023-05-25 DIAGNOSIS — R10.9 RIGHT FLANK PAIN: ICD-10-CM

## 2023-05-25 DIAGNOSIS — K57.92 ACUTE DIVERTICULITIS: ICD-10-CM

## 2023-05-25 DIAGNOSIS — N39.0 URINARY TRACT INFECTION WITHOUT HEMATURIA, SITE UNSPECIFIED: Primary | ICD-10-CM

## 2023-05-25 LAB
ALBUMIN SERPL-MCNC: 4.2 G/DL (ref 3.4–4.8)
ALBUMIN/GLOB SERPL: 1.6 RATIO (ref 1.1–2)
ALP SERPL-CCNC: 104 UNIT/L (ref 40–150)
ALT SERPL-CCNC: 23 UNIT/L (ref 0–55)
AST SERPL-CCNC: 21 UNIT/L (ref 5–34)
BASOPHILS # BLD AUTO: 0.02 X10(3)/MCL
BASOPHILS NFR BLD AUTO: 0.2 %
BILIRUBIN DIRECT+TOT PNL SERPL-MCNC: 1 MG/DL
BUN SERPL-MCNC: 22.8 MG/DL (ref 8.4–25.7)
CALCIUM SERPL-MCNC: 9.4 MG/DL (ref 8.8–10)
CHLORIDE SERPL-SCNC: 107 MMOL/L (ref 98–107)
CO2 SERPL-SCNC: 21 MMOL/L (ref 23–31)
CREAT SERPL-MCNC: 1.31 MG/DL (ref 0.73–1.18)
EOSINOPHIL # BLD AUTO: 0.02 X10(3)/MCL (ref 0–0.9)
EOSINOPHIL NFR BLD AUTO: 0.2 %
ERYTHROCYTE [DISTWIDTH] IN BLOOD BY AUTOMATED COUNT: 13.7 % (ref 11.5–17)
GFR SERPLBLD CREATININE-BSD FMLA CKD-EPI: 55 MLS/MIN/1.73/M2
GLOBULIN SER-MCNC: 2.7 GM/DL (ref 2.4–3.5)
GLUCOSE SERPL-MCNC: 110 MG/DL (ref 82–115)
HCT VFR BLD AUTO: 46.4 % (ref 42–52)
HGB BLD-MCNC: 15.5 G/DL (ref 14–18)
IMM GRANULOCYTES # BLD AUTO: 0.04 X10(3)/MCL (ref 0–0.04)
IMM GRANULOCYTES NFR BLD AUTO: 0.3 %
LIPASE SERPL-CCNC: 20 U/L
LYMPHOCYTES # BLD AUTO: 1.54 X10(3)/MCL (ref 0.6–4.6)
LYMPHOCYTES NFR BLD AUTO: 12.4 %
MCH RBC QN AUTO: 30.8 PG (ref 27–31)
MCHC RBC AUTO-ENTMCNC: 33.4 G/DL (ref 33–36)
MCV RBC AUTO: 92.2 FL (ref 80–94)
MONOCYTES # BLD AUTO: 0.77 X10(3)/MCL (ref 0.1–1.3)
MONOCYTES NFR BLD AUTO: 6.2 %
NEUTROPHILS # BLD AUTO: 10.03 X10(3)/MCL (ref 2.1–9.2)
NEUTROPHILS NFR BLD AUTO: 80.7 %
NRBC BLD AUTO-RTO: 0 %
PLATELET # BLD AUTO: 244 X10(3)/MCL (ref 130–400)
PMV BLD AUTO: 10.4 FL (ref 7.4–10.4)
POTASSIUM SERPL-SCNC: 3.6 MMOL/L (ref 3.5–5.1)
PROT SERPL-MCNC: 6.9 GM/DL (ref 5.8–7.6)
RBC # BLD AUTO: 5.03 X10(6)/MCL (ref 4.7–6.1)
SODIUM SERPL-SCNC: 141 MMOL/L (ref 136–145)
WBC # SPEC AUTO: 12.42 X10(3)/MCL (ref 4.5–11.5)

## 2023-05-25 PROCEDURE — 99213 PR OFFICE/OUTPT VISIT, EST, LEVL III, 20-29 MIN: ICD-10-PCS | Mod: ,,, | Performed by: INTERNAL MEDICINE

## 2023-05-25 PROCEDURE — 63600175 PHARM REV CODE 636 W HCPCS: Performed by: PHYSICIAN ASSISTANT

## 2023-05-25 PROCEDURE — 96375 TX/PRO/DX INJ NEW DRUG ADDON: CPT

## 2023-05-25 PROCEDURE — 99213 OFFICE O/P EST LOW 20 MIN: CPT | Mod: ,,, | Performed by: INTERNAL MEDICINE

## 2023-05-25 PROCEDURE — 83690 ASSAY OF LIPASE: CPT | Performed by: PHYSICIAN ASSISTANT

## 2023-05-25 PROCEDURE — 85025 COMPLETE CBC W/AUTO DIFF WBC: CPT | Performed by: PHYSICIAN ASSISTANT

## 2023-05-25 PROCEDURE — 63600175 PHARM REV CODE 636 W HCPCS: Performed by: EMERGENCY MEDICINE

## 2023-05-25 PROCEDURE — 99285 EMERGENCY DEPT VISIT HI MDM: CPT | Mod: 25

## 2023-05-25 PROCEDURE — 25000003 PHARM REV CODE 250: Performed by: EMERGENCY MEDICINE

## 2023-05-25 PROCEDURE — 96374 THER/PROPH/DIAG INJ IV PUSH: CPT

## 2023-05-25 PROCEDURE — 80053 COMPREHEN METABOLIC PANEL: CPT | Performed by: PHYSICIAN ASSISTANT

## 2023-05-25 RX ORDER — KETOROLAC TROMETHAMINE 30 MG/ML
15 INJECTION, SOLUTION INTRAMUSCULAR; INTRAVENOUS
Status: COMPLETED | OUTPATIENT
Start: 2023-05-25 | End: 2023-05-25

## 2023-05-25 RX ORDER — ONDANSETRON 2 MG/ML
4 INJECTION INTRAMUSCULAR; INTRAVENOUS
Status: COMPLETED | OUTPATIENT
Start: 2023-05-25 | End: 2023-05-25

## 2023-05-25 RX ORDER — ONDANSETRON 4 MG/1
4 TABLET, ORALLY DISINTEGRATING ORAL EVERY 6 HOURS PRN
Qty: 20 TABLET | Refills: 0 | Status: SHIPPED | OUTPATIENT
Start: 2023-05-25 | End: 2024-02-14

## 2023-05-25 RX ORDER — MORPHINE SULFATE 4 MG/ML
4 INJECTION, SOLUTION INTRAMUSCULAR; INTRAVENOUS
Status: COMPLETED | OUTPATIENT
Start: 2023-05-25 | End: 2023-05-25

## 2023-05-25 RX ORDER — HYDROCODONE BITARTRATE AND ACETAMINOPHEN 7.5; 325 MG/1; MG/1
1 TABLET ORAL EVERY 6 HOURS PRN
Qty: 12 TABLET | Refills: 0 | Status: SHIPPED | OUTPATIENT
Start: 2023-05-25 | End: 2023-09-28

## 2023-05-25 RX ORDER — IBUPROFEN 800 MG/1
800 TABLET ORAL EVERY 6 HOURS PRN
Qty: 20 TABLET | Refills: 0 | Status: SHIPPED | OUTPATIENT
Start: 2023-05-25

## 2023-05-25 RX ORDER — CIPROFLOXACIN 500 MG/1
500 TABLET ORAL 2 TIMES DAILY
Qty: 14 TABLET | Refills: 0 | Status: SHIPPED | OUTPATIENT
Start: 2023-05-25 | End: 2023-06-01

## 2023-05-25 RX ORDER — METRONIDAZOLE 500 MG/1
500 TABLET ORAL 3 TIMES DAILY
Qty: 21 TABLET | Refills: 0 | Status: SHIPPED | OUTPATIENT
Start: 2023-05-25 | End: 2023-06-01

## 2023-05-25 RX ADMIN — MORPHINE SULFATE 4 MG: 4 INJECTION INTRAVENOUS at 03:05

## 2023-05-25 RX ADMIN — KETOROLAC TROMETHAMINE 15 MG: 30 INJECTION, SOLUTION INTRAMUSCULAR; INTRAVENOUS at 02:05

## 2023-05-25 RX ADMIN — ONDANSETRON 4 MG: 2 INJECTION INTRAMUSCULAR; INTRAVENOUS at 03:05

## 2023-05-25 RX ADMIN — SODIUM CHLORIDE 1000 ML: 9 INJECTION, SOLUTION INTRAVENOUS at 03:05

## 2023-05-25 NOTE — DISCHARGE INSTRUCTIONS
Your CT scan showed a 5 mm stone on the right side that is about pass into the bladder.  Make sure uterine plenty of fluids and take the pain medication as needed.  Continue Flomax and call Dr. Obando to follow up as soon as possible.  Return to the ED immediately for pain that is uncontrolled or nausea or vomiting that is uncontrolled, fever.  Your CT scan also showed some cysts on your liver that can be further evaluated by your primary care doctor.  Call her to discuss what evaluation would be indicated.  Your CT scan also showed some diverticulitis.  Given your changes in bowel movements I have prescribed antibiotics and should take until they are completed.

## 2023-05-25 NOTE — ED PROVIDER NOTES
Encounter Date: 5/25/2023    SCRIBE #1 NOTE: I, Bri Jackson, am scribing for, and in the presence of,  Mary Phillips MD. I have scribed the following portions of the note - Other sections scribed: HPI, ROS, PE.   SCRIBE #2 NOTE: I, Bethany Woodardlobo, am scribing for, and in the presence of,  Mary Phillips MD. Other sections scribed: HPI, ROS, PE.   History     Chief Complaint   Patient presents with    Flank Pain     Sent from PCP for kidney stones. Reports R flank pain onset yesterday. Also reports painful urination. Denies fever, N/V.      76 y/o male with hx of HLD, HTN, & paroxysmal atrial fibrillation presents to ED with flank pain. Pt says he woke up @ 0330am yesterday from the pain & had a bowel movement & the pain moderated a little bit but it worsened again this morning. Pt says he has right back side pain that comes around to the right side of his abdomen. Pt says he was told to get a urine test & the urine test was a rust color that contained blood and he was told by his PCP that he had a kidney stone. Pt also c/o urinary urgency but decreased urine output. Pt denies any new medication, nausea, & vomiting.    His urologist is Gerardo Obando M.D.,    The history is provided by the patient. No  was used.   Flank Pain  This is a new problem. The problem occurs constantly. Associated symptoms include abdominal pain. Pertinent negatives include no shortness of breath.   Review of patient's allergies indicates:   Allergen Reactions    Penicillins Other (See Comments)     Unknown what type of reaction      Statins-hmg-coa reductase inhibitors Other (See Comments)     Unknown what type of reaction     Past Medical History:   Diagnosis Date    Carpal tunnel syndrome     Cataracts, bilateral     HLD (hyperlipidemia)     HTN (hypertension)     Paroxysmal atrial fibrillation     Rheumatoid arthritis, unspecified     Unspecified osteoarthritis, unspecified site      Past Surgical  History:   Procedure Laterality Date    APPENDECTOMY      CARPAL TUNNEL RELEASE      CATARACT EXTRACTION      COLON SURGERY      EYE SURGERY      FORAMINOTOMY Left 2023    C3-4 anterior foraminotomy.  Dr. Chapman    JOINT REPLACEMENT  Both knees  2009    NEUROPLASTY  2012    TONSILLECTOMY  As a child    TOTAL KNEE ARTHROPLASTY  2017    TRANSURETHRAL RESECTION OF PROSTATE  2017     Family History   Problem Relation Age of Onset    Arthritis Father     Hearing loss Father     Stroke Father     Alcohol abuse Maternal Uncle      Social History     Tobacco Use    Smoking status: Former     Types: Cigars     Start date: 6/15/1962     Quit date: 6/15/1972     Years since quittin.9    Smokeless tobacco: Never   Substance Use Topics    Alcohol use: Not Currently    Drug use: Never     Review of Systems   Constitutional:  Negative for fever.   Respiratory:  Negative for shortness of breath.    Gastrointestinal:  Positive for abdominal pain. Negative for nausea and vomiting.   Genitourinary:  Positive for decreased urine volume, flank pain and hematuria.   Musculoskeletal:  Positive for back pain.     Physical Exam     Initial Vitals [23 1400]   BP Pulse Resp Temp SpO2   (!) 143/78 101 20 98.3 °F (36.8 °C) (!) 94 %      MAP       --         Physical Exam    Nursing note and vitals reviewed.  Constitutional: He appears well-developed and well-nourished. He is not diaphoretic. No distress.   Appears uncomfortable.    HENT:   Head: Normocephalic and atraumatic.   Nose: Nose normal.   Mouth/Throat: Oropharynx is clear and moist.   Eyes: Conjunctivae and EOM are normal. Pupils are equal, round, and reactive to light.   Neck: Trachea normal. Neck supple.   Normal range of motion.  Cardiovascular:  Normal rate, regular rhythm, normal heart sounds and intact distal pulses.     Exam reveals no gallop and no friction rub.       No murmur heard.  Pulmonary/Chest: Breath sounds normal. No  respiratory distress. He has no wheezes. He has no rhonchi. He has no rales. He exhibits no tenderness.   Abdominal: Abdomen is soft. Bowel sounds are normal. He exhibits no distension and no mass. There is no abdominal tenderness. There is no rebound.   Musculoskeletal:         General: No tenderness or edema. Normal range of motion.      Cervical back: Normal range of motion and neck supple.      Lumbar back: Normal. No tenderness. Normal range of motion.      Comments: Right flank tenderness to palpation, no CVA tenderness to percussion.      Neurological: He is alert and oriented to person, place, and time. He has normal strength. No cranial nerve deficit or sensory deficit.   Skin: Skin is warm and dry. Capillary refill takes less than 2 seconds. No rash and no abscess noted. No erythema. No pallor.   Psychiatric: He has a normal mood and affect. His behavior is normal. Judgment and thought content normal.       ED Course   Procedures  Labs Reviewed   COMPREHENSIVE METABOLIC PANEL - Abnormal; Notable for the following components:       Result Value    Carbon Dioxide 21 (*)     Creatinine 1.31 (*)     All other components within normal limits   CBC WITH DIFFERENTIAL - Abnormal; Notable for the following components:    WBC 12.42 (*)     Neut # 10.03 (*)     All other components within normal limits   LIPASE - Normal   CBC W/ AUTO DIFFERENTIAL    Narrative:     The following orders were created for panel order CBC auto differential.  Procedure                               Abnormality         Status                     ---------                               -----------         ------                     CBC with Differential[[id number]]        Abnormal            Final result                 Please view results for these tests on the individual orders.          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      Electronically signed by: Rose Rogers  Date:    05/25/2023  Time:    15:06               Narrative:    EXAMINATION:  CT ABDOMEN PELVIS WITHOUT CONTRAST    CLINICAL HISTORY:  Flank pain, kidney stone suspected;    TECHNIQUE:  Low dose axial images, sagittal and coronal reformations were obtained from the lung bases to the pubic symphysis.  No contrast was administered.  Automatic exposure control is utilized to reduce patient radiation exposure.    COMPARISON:  None available    FINDINGS:  The lung bases are clear.    The liver is normal in size.  There is a hypoattenuated area seen in the dome of the liver as well as hypoattenuated areas in segment 2 of the liver.  Findings could represent cysts but correlation with contrast enhanced CT scan or ultrasound is recommended..    Gallbladder appears normal.  No gallstones are seen.    The pancreas appears normal.  No inflammatory changes are seen in the pancreatic region.    The spleen appears normal and adrenal glands appear normal.  No adrenal nodule is seen.    There is a exophytic cystic lesion in the upper pole of the right kidney.  There are medullary calculi seen in the right kidney and there is right-sided hydronephrosis and hydroureter secondary to a large stone in the right distal ureter at the UVJ.  It measures 5.6 by 4.6 mm by 5 mm.  There are some medullary calculi in the left kidney as well which are not causing obstruction.  No left-sided  hydronephrosis or hydroureter is seen..    There are some diverticuli in the sigmoid colon with some inflammatory changes seen associated with the diverticuli in the distal descending and proximal sigmoid colon consistent with acute diverticulitis.  No perforation or abscess is seen..    No free air seen.  No free fluid is seen.  The urinary bladder appears normal.    Bones show no acute abnormality.                                       Medications   ketorolac injection 15 mg (15 mg Intravenous Given 5/25/23 1425)   morphine injection 4 mg (4 mg Intravenous Given 5/25/23 1505)   ondansetron injection 4 mg (4 mg Intravenous Given 5/25/23 1505)   sodium chloride 0.9% bolus 1,000 mL 1,000 mL (1,000 mLs Intravenous New Bag 5/25/23 1515)     Medical Decision Making:   History:   Old Medical Records: I decided to obtain old medical records.  Old Records Summarized: records from clinic visits.  Initial Assessment:   See HPI  Clinical Tests:   Lab Tests: Ordered and Reviewed  The following lab test(s) were unremarkable: CBC and CMP  Radiological Study: Ordered and ReviewedMedical Decision Making  Differential diagnosis include, but are not limited to: Kidney stones, pyelonephritis, UTI.  CBC, CMP ordered and reviewed and urinalysis that was performed as an outpatient within the past 12 hours was also reviewed notable for hematuria.  CT abdomen and pelvis without contrast ordered and reviewed showing distal right UVJ stone as well as some diverticulitis.  He has been having a change in bowel movements therefore this will be treated.  His pain was controlled and he felt very comfortable and wished to go home with continued outpatient treatment.  He understands strict return precautions.  He has a urologist that he can follow up with.  You shared decision-making to determine disposition.  Given IV Toradol and morphine with improvement.  He was very comfortable with the plan    Problems Addressed:  Acute diverticulitis: acute  illness or injury that poses a threat to life or bodily functions  Calculus of distal right ureter: acute illness or injury that poses a threat to life or bodily functions  Right flank pain: acute illness or injury that poses a threat to life or bodily functions    Amount and/or Complexity of Data Reviewed  Labs: ordered.  Radiology: ordered.    Risk  OTC drugs.  Prescription drug management.  Parenteral controlled substances.             Scribe Attestation:   Scribe #1: I performed the above scribed service and the documentation accurately describes the services I performed. I attest to the accuracy of the note.  Comments: Attending:   Physician Attestation Statement for Scribe #1: IMary MD, personally performed the services described in this documentation. All medical record entries made by the scribe were at my direction and in my presence.  I have reviewed the chart and agree that the record reflects my personal performance and is accurate and complete.        Attending Attestation:           Physician Attestation for Scribe:  Physician Attestation Statement for Scribe #1: IMary MD, reviewed documentation, as scribed by Bri Jackson in my presence, and it is both accurate and complete.           ED Course as of 05/25/23 1744   Thu May 25, 2023   1550 Pain resolved, has had changes in bm but not llq that he typically has with diverticulitis [BS]   1718 And remains controlled and he was very comfortable with discharge.  Discussed strict return precautions and he voiced understanding and agrees is comfortable plan [BS]      ED Course User Index  [BS] Mary Phillips MD                 Clinical Impression:   Final diagnoses:  [N20.1] Calculus of distal right ureter (Primary)  [K57.92] Acute diverticulitis  [R10.9] Right flank pain        ED Disposition Condition    Discharge Stable          ED Prescriptions       Medication Sig Dispense Start Date End Date Auth. Provider     HYDROcodone-acetaminophen (NORCO) 7.5-325 mg per tablet Take 1 tablet by mouth every 6 (six) hours as needed for Pain (severe pain only). 12 tablet 5/25/2023 -- Mary Phillips MD    ibuprofen (ADVIL,MOTRIN) 800 MG tablet Take 1 tablet (800 mg total) by mouth every 6 (six) hours as needed for Pain (take with food or milk for mild-moderate pain). 20 tablet 5/25/2023 -- Mary Phillips MD    ciprofloxacin HCl (CIPRO) 500 MG tablet Take 1 tablet (500 mg total) by mouth 2 (two) times daily. for 7 days 14 tablet 5/25/2023 6/1/2023 Mary Phillips MD    metroNIDAZOLE (FLAGYL) 500 MG tablet Take 1 tablet (500 mg total) by mouth 3 (three) times daily. for 7 days 21 tablet 5/25/2023 6/1/2023 Mary Phillips MD    ondansetron (ZOFRAN-ODT) 4 MG TbDL Take 1 tablet (4 mg total) by mouth every 6 (six) hours as needed (nausea). 20 tablet 5/25/2023 -- Mary Phillips MD          Follow-up Information       Follow up With Specialties Details Why Contact Info    Lenard Miranda MD Internal Medicine Schedule an appointment as soon as possible for a visit   [address]  [facility] [address]  [phone]      [facility] - Emergency Dept Emergency Medicine  As needed, If symptoms worsen [address] [id number] [phone]    Gerardo Obando MD Urology Schedule an appointment as soon as possible for a visit   [address]  [place] [address]  [phone]               Mary Phillips MD  05/25/23 5897

## 2023-05-25 NOTE — TELEPHONE ENCOUNTER
Pt calling with c/o pain and unable to urinate pt said that he thought was a urinary infection but the pain is in the back kidney area and hasnt been able to urinate now. Pt said that he was trying to get the office and they were at lunch and Pt said that he submitted a U/A just now and he said that he will just drive to the ED as the pain is getting worse. Pt told that's what he should do is go to the ED. Pt to call back if any other questions or concerns or any other issues.           Reason for Disposition   Unable to urinate (or only a few drops) > 4 hours and bladder feels very full (e.g., palpable bladder or strong urge to urinate)    Additional Information   Negative: Shock suspected (e.g., cold/pale/clammy skin, too weak to stand, low BP, rapid pulse)   Negative: Sounds like a life-threatening emergency to the triager    Protocols used: Urinary Symptoms-A-OH

## 2023-05-25 NOTE — FIRST PROVIDER EVALUATION
Medical screening examination initiated.  I have conducted a focused provider triage encounter, findings are as follows:    Chief Complaint   Patient presents with    Flank Pain     Sent from PCP for kidney stones. Reports R flank pain onset yesterday. Also reports painful urination. Denies fever, N/V.      Brief history of present illness:  77 y.o. male presents to the ED with right flank pain radiating to RLQ onset this morning around 3am. States he went ot to his PCP this morning (Dr Lee) and noted to have blood in his urine, sent here for CT. No history of stones. States it feels as though he cant empty his bladder. Denies fever, n/v. No history of stones    Vitals:    05/25/23 1400   BP: (!) 143/78   Pulse: 101   Resp: 20   Temp: 98.3 °F (36.8 °C)   TempSrc: Oral   SpO2: (!) 94%     Pertinent physical exam:  Awake, alert, ambulatory, non-labored respirations, unable to sit still, visibly uncomfortable     Brief workup plan:  labs, UA, CT    Preliminary workup initiated; this workup will be continued and followed by the physician or advanced practice provider that is assigned to the patient when roomed.

## 2023-05-25 NOTE — TELEPHONE ENCOUNTER
----- Message from Johnathan Becerra sent at 5/25/2023 10:48 AM CDT -----  .Type:  Needs Medical Advice    Who Called: Deshaun  Symptoms (please be specific):    How long has patient had these symptoms:    Pharmacy name and phone #:    Would the patient rather a call back or a response via [facility]?   Best Call Back Number: [phone]  Additional Information: He has requested to speak with nurse re:  possible bladder infection.

## 2023-05-25 NOTE — PROGRESS NOTES
Subjective:      Patient ID: Deshaun Lang Jr. is a 77 y.o. male.    Chief Complaint: Back Pain and Abdominal Pain      HPI:  77-year-old  male presents today with complaints of right-sided flank pain that radiates down into his right groin that has been present since 3 a.m. yesterday morning.  He has no history of nephrolithiasis he is clearly very uncomfortable unable to sit still, diaphoretic, breathing heavily.  He has more than 100 RBCs on his urine, no bacteria.    Past medical history of rheumatoid arthritis treated by Dr. Gely Herman with plaquenil   Dr. Qiu for optho  Also medical history of hypertension, HLD, insmonia and BPH.  He is up-to-date with screening colonoscopy as well as PSA checks- Dr Obando  Patient is intolerant to statins and takes cholestyramine but does not take it as prescribed.  He works on as a restoration  on airplanes    S/P right knee replacement doing excellent so far he said both knees done in both shoulders replaced  TURP with Dallin Obando- last visit was a year ago    Past Medical History:  Past Medical History:   Diagnosis Date    Carpal tunnel syndrome     Cataracts, bilateral     HLD (hyperlipidemia)     HTN (hypertension)     Paroxysmal atrial fibrillation     Rheumatoid arthritis, unspecified     Unspecified osteoarthritis, unspecified site      Past Surgical History:   Procedure Laterality Date    APPENDECTOMY  1962    CARPAL TUNNEL RELEASE      CATARACT EXTRACTION      COLON SURGERY  1962    EYE SURGERY  2012    FORAMINOTOMY Left 02/24/2023    C3-4 anterior foraminotomy.  Dr. Chapman    JOINT REPLACEMENT  Both knees  2009    NEUROPLASTY  03/22/2012    TONSILLECTOMY  As a child    TOTAL KNEE ARTHROPLASTY  08/22/2017    TRANSURETHRAL RESECTION OF PROSTATE  08/30/2017     Review of patient's allergies indicates:   Allergen Reactions    Penicillins Other (See Comments)     Unknown what type of reaction      Statins-hmg-coa reductase inhibitors  Other (See Comments)     Unknown what type of reaction     Current Outpatient Medications on File Prior to Visit   Medication Sig Dispense Refill    amLODIPine-benazepriL (LOTREL) 10-40 mg per capsule TAKE ONE CAPSULE ONCE DAILY 30 capsule 11    apixaban (ELIQUIS) 5 mg Tab Take 1 tablet (5 mg total) by mouth 2 (two) times daily. 180 tablet 0    colestipoL (COLESTID) 1 gram Tab TAKE 1 TABLET BY MOUTH TWICE A DAY WITH A FULL GLASS OF WATER 180 tablet 3    eszopiclone (LUNESTA) 2 MG Tab Take 1 tablet (2 mg total) by mouth nightly. 30 tablet 5    hydrOXYchloroQUINE (PLAQUENIL) 200 mg tablet Take 200 mg by mouth 2 (two) times daily.      predniSONE (DELTASONE) 5 MG tablet once daily.      sulfaSALAzine (AZULFIDINE) 500 mg Tab Take by mouth.      tamsulosin (FLOMAX) 0.4 mg Cap TAKE ONE CAPSULE BY MOUTH TWICE DAILY 120 capsule 3    traMADoL (ULTRAM) 50 mg tablet TAKE ONE TABLET BY MOUTH EVERY 6 HOURS AS NEEDED FOR PAIN 60 tablet 0    vitamin D (VITAMIN D3) 1000 units Tab Take 1,000 Units by mouth once daily.      zinc gluconate 50 mg tablet Take 50 mg by mouth once daily.       No current facility-administered medications on file prior to visit.     Social History     Socioeconomic History    Marital status:    Tobacco Use    Smoking status: Former     Types: Cigars     Start date: 6/15/1962     Quit date: 6/15/1972     Years since quittin.9    Smokeless tobacco: Never   Substance and Sexual Activity    Alcohol use: Not Currently    Drug use: Never    Sexual activity: Not Currently     Family History   Problem Relation Age of Onset    Arthritis Father     Hearing loss Father     Stroke Father     Alcohol abuse Maternal Uncle        Review of Systems  A comprehensive review of systems was performed and was negative with exception of what is documented above.     Objective:   /78 (BP Location: Left arm, Patient Position: Sitting, BP Method: Medium (Manual))   Pulse 82   Temp 97.9 °F (36.6 °C) (Temporal)   " Resp 16   Ht 5' 7" (1.702 m)   Wt 84.4 kg (186 lb)   SpO2 95%   BMI 29.13 kg/m²   Physical Exam  General : Alert and oriented, No acute distress, afebrile.  Eye : PERRLA. EOMI. Normal conjunctiva, Sclerae are nonicteric. No conjunctival injection, + pale, clammy, diaphoretic  HEENT : Normocephalic/ atraumatic, Normal hearing  Cardiovascular : Normal rate, Regular rhythm. No murmurs, rubs, or gallops. Pulses are 2+ throughout. No JVD. No Edema.  Gastrointestinal : Soft, nontender, non-distended, bowel sounds are present in all quadrants, no organomegaly, no guarding, no rebound.  Musculoskeletal : Normal range of motion throughout. No muscle tenderness.  Integumentary : Warm, moist, intact.  Neurologic : Alert, Oriented  Psychiatric : Cooperative, Appropriate mood & affect.   Assessment/ Plan:   1. Nephrolithiasis  Assessment & Plan:  Patient clearly uncomfortable, advised to present to the ER for pain control, stat CT scan and based on nephrolithiasis' size will determine treatment approach              "

## 2023-05-25 NOTE — ASSESSMENT & PLAN NOTE
Patient clearly uncomfortable, advised to present to the ER for pain control, stat CT scan and based on nephrolithiasis' size will determine treatment approach

## 2023-05-31 DIAGNOSIS — M19.90 OSTEOARTHRITIS, UNSPECIFIED OSTEOARTHRITIS TYPE, UNSPECIFIED SITE: ICD-10-CM

## 2023-05-31 RX ORDER — TRAMADOL HYDROCHLORIDE 50 MG/1
TABLET ORAL
Qty: 60 TABLET | Refills: 0 | Status: SHIPPED | OUTPATIENT
Start: 2023-05-31 | End: 2023-06-29

## 2023-06-29 DIAGNOSIS — M19.90 OSTEOARTHRITIS, UNSPECIFIED OSTEOARTHRITIS TYPE, UNSPECIFIED SITE: ICD-10-CM

## 2023-06-29 RX ORDER — TRAMADOL HYDROCHLORIDE 50 MG/1
TABLET ORAL
Qty: 60 TABLET | Refills: 0 | Status: SHIPPED | OUTPATIENT
Start: 2023-06-29 | End: 2023-07-31 | Stop reason: SDUPTHER

## 2023-07-06 DIAGNOSIS — G47.09 OTHER INSOMNIA: ICD-10-CM

## 2023-07-06 RX ORDER — ESZOPICLONE 2 MG/1
2 TABLET, FILM COATED ORAL NIGHTLY
Qty: 30 TABLET | Refills: 5 | Status: SHIPPED | OUTPATIENT
Start: 2023-07-06 | End: 2024-01-02

## 2023-07-31 DIAGNOSIS — M19.90 OSTEOARTHRITIS, UNSPECIFIED OSTEOARTHRITIS TYPE, UNSPECIFIED SITE: ICD-10-CM

## 2023-07-31 RX ORDER — TRAMADOL HYDROCHLORIDE 50 MG/1
TABLET ORAL
Qty: 60 TABLET | Refills: 0 | Status: SHIPPED | OUTPATIENT
Start: 2023-07-31 | End: 2023-08-30

## 2023-07-31 NOTE — TELEPHONE ENCOUNTER
----- Message from Antonietta Hassan sent at 7/31/2023  3:08 PM CDT -----  Regarding: Refill Request       .Type:  RX Refill Request    Who Called: pt  Refill or New Rx:refill   RX Name and Strength:traMADoL (ULTRAM) 50 mg tablet  How is the patient currently taking it? (ex. 1XDay):2x  Is this a 30 day or 90 day RX:60  Preferred Pharmacy with phone number:[facility] - [address]  Local or Mail Order:local   Ordering Provider:CAROL Lee   Would the patient rather a call back or a response via [facility]?   Best Call Back Number:[phone]  Additional Information: refill request

## 2023-08-07 ENCOUNTER — TELEPHONE (OUTPATIENT)
Dept: INTERNAL MEDICINE | Facility: CLINIC | Age: 78
End: 2023-08-07
Payer: MEDICARE

## 2023-08-07 DIAGNOSIS — N40.0 BENIGN PROSTATIC HYPERPLASIA, UNSPECIFIED WHETHER LOWER URINARY TRACT SYMPTOMS PRESENT: ICD-10-CM

## 2023-08-07 DIAGNOSIS — I10 HYPERTENSION, UNSPECIFIED TYPE: Primary | ICD-10-CM

## 2023-08-07 DIAGNOSIS — E78.5 HYPERLIPIDEMIA, UNSPECIFIED HYPERLIPIDEMIA TYPE: ICD-10-CM

## 2023-08-07 DIAGNOSIS — E55.9 VITAMIN D DEFICIENCY: ICD-10-CM

## 2023-08-07 NOTE — TELEPHONE ENCOUNTER
----- Message from Dustin Krueger MA sent at 8/7/2023  8:50 AM CDT -----  Regarding: PV 8/14/23 @ 1:00 JOSE Hernandez  Medicare wellness - 20 minute time slot    1. Are there any outstanding tasks in the patient's chart? Yes, fasting labs    2. Is there any documentation in the chart? No    3.Has patient been seen in an ER, Urgent care clinic, or been admitted since last visit?  If yes, When, where, and why    4. Has patient seen any other healthcare providers since last visit?  If yes, when, where, and why    5. Has patient had any bloodwork or XR done since last visit?    6. Is patient signed up for patient portal?

## 2023-08-07 NOTE — TELEPHONE ENCOUNTER
----- Message from Dustin Krueger MA sent at 8/7/2023 12:40 PM CDT -----  Regarding: FW: Return call    ----- Message -----  From: Melany Cash  Sent: 8/7/2023  12:11 PM CDT  To: Mick Weinberg Staff  Subject: Return call                                      .Type:  Patient Returning Call    Who Called:Deshaun  Who Left Message for Patient:Deshaun  Does the patient know what this is regarding?:Missed call  Would the patient rather a call back or a response via [facility]?   Best Call Back Number:[phone]  Additional Information: Patient missed 2 calls from office and was returning the calls

## 2023-08-10 ENCOUNTER — LAB VISIT (OUTPATIENT)
Dept: LAB | Facility: HOSPITAL | Age: 78
End: 2023-08-10
Attending: NURSE PRACTITIONER
Payer: MEDICARE

## 2023-08-10 DIAGNOSIS — I10 HYPERTENSION, UNSPECIFIED TYPE: ICD-10-CM

## 2023-08-10 DIAGNOSIS — N40.0 BENIGN PROSTATIC HYPERPLASIA, UNSPECIFIED WHETHER LOWER URINARY TRACT SYMPTOMS PRESENT: ICD-10-CM

## 2023-08-10 DIAGNOSIS — E78.5 HYPERLIPIDEMIA, UNSPECIFIED HYPERLIPIDEMIA TYPE: ICD-10-CM

## 2023-08-10 DIAGNOSIS — E55.9 VITAMIN D DEFICIENCY: ICD-10-CM

## 2023-08-10 LAB
ALBUMIN SERPL-MCNC: 3.9 G/DL (ref 3.4–4.8)
ALBUMIN/GLOB SERPL: 1.6 RATIO (ref 1.1–2)
ALP SERPL-CCNC: 97 UNIT/L (ref 40–150)
ALT SERPL-CCNC: 23 UNIT/L (ref 0–55)
APPEARANCE UR: CLEAR
AST SERPL-CCNC: 24 UNIT/L (ref 5–34)
BACTERIA #/AREA URNS AUTO: NORMAL /HPF
BASOPHILS # BLD AUTO: 0.03 X10(3)/MCL
BASOPHILS NFR BLD AUTO: 0.4 %
BILIRUB SERPL-MCNC: 0.5 MG/DL
BILIRUB UR QL STRIP.AUTO: NEGATIVE
BUN SERPL-MCNC: 19.6 MG/DL (ref 8.4–25.7)
CALCIUM SERPL-MCNC: 8.9 MG/DL (ref 8.8–10)
CHLORIDE SERPL-SCNC: 109 MMOL/L (ref 98–107)
CHOLEST SERPL-MCNC: 225 MG/DL
CHOLEST/HDLC SERPL: 5 {RATIO} (ref 0–5)
CO2 SERPL-SCNC: 26 MMOL/L (ref 23–31)
COLOR UR: YELLOW
CREAT SERPL-MCNC: 0.97 MG/DL (ref 0.73–1.18)
DEPRECATED CALCIDIOL+CALCIFEROL SERPL-MC: 52.7 NG/ML (ref 30–80)
EOSINOPHIL # BLD AUTO: 0.05 X10(3)/MCL (ref 0–0.9)
EOSINOPHIL NFR BLD AUTO: 0.7 %
ERYTHROCYTE [DISTWIDTH] IN BLOOD BY AUTOMATED COUNT: 14.1 % (ref 11.5–17)
FREE/TOTAL PSA (OLG): 0.3
GFR SERPLBLD CREATININE-BSD FMLA CKD-EPI: >60 MLS/MIN/1.73/M2
GLOBULIN SER-MCNC: 2.5 GM/DL (ref 2.4–3.5)
GLUCOSE SERPL-MCNC: 98 MG/DL (ref 82–115)
GLUCOSE UR QL STRIP.AUTO: NEGATIVE
HCT VFR BLD AUTO: 45.2 % (ref 42–52)
HDLC SERPL-MCNC: 50 MG/DL (ref 35–60)
HGB BLD-MCNC: 15.4 G/DL (ref 14–18)
IMM GRANULOCYTES # BLD AUTO: 0.01 X10(3)/MCL (ref 0–0.04)
IMM GRANULOCYTES NFR BLD AUTO: 0.1 %
KETONES UR QL STRIP.AUTO: NEGATIVE
LDLC SERPL CALC-MCNC: 162 MG/DL (ref 50–140)
LEUKOCYTE ESTERASE UR QL STRIP.AUTO: NEGATIVE
LYMPHOCYTES # BLD AUTO: 1.87 X10(3)/MCL (ref 0.6–4.6)
LYMPHOCYTES NFR BLD AUTO: 24.5 %
MCH RBC QN AUTO: 31.9 PG (ref 27–31)
MCHC RBC AUTO-ENTMCNC: 34.1 G/DL (ref 33–36)
MCV RBC AUTO: 93.6 FL (ref 80–94)
MONOCYTES # BLD AUTO: 0.49 X10(3)/MCL (ref 0.1–1.3)
MONOCYTES NFR BLD AUTO: 6.4 %
NEUTROPHILS # BLD AUTO: 5.19 X10(3)/MCL (ref 2.1–9.2)
NEUTROPHILS NFR BLD AUTO: 67.9 %
NITRITE UR QL STRIP.AUTO: NEGATIVE
NRBC BLD AUTO-RTO: 0 %
PH UR STRIP.AUTO: 6.5 [PH]
PLATELET # BLD AUTO: 216 X10(3)/MCL (ref 130–400)
PMV BLD AUTO: 10.2 FL (ref 7.4–10.4)
POTASSIUM SERPL-SCNC: 4.3 MMOL/L (ref 3.5–5.1)
PROT SERPL-MCNC: 6.4 GM/DL (ref 5.8–7.6)
PROT UR QL STRIP.AUTO: NEGATIVE
PSA FREE MFR SERPL: 34 %
PSA FREE SERPL-MCNC: 0.21 NG/ML
PSA SERPL-MCNC: 0.61 NG/ML
RBC # BLD AUTO: 4.83 X10(6)/MCL (ref 4.7–6.1)
RBC #/AREA URNS AUTO: <5 /HPF
RBC UR QL AUTO: NEGATIVE
SODIUM SERPL-SCNC: 142 MMOL/L (ref 136–145)
SP GR UR STRIP.AUTO: 1.01 (ref 1–1.03)
SQUAMOUS #/AREA URNS AUTO: <5 /HPF
TRIGL SERPL-MCNC: 67 MG/DL (ref 34–140)
TSH SERPL-ACNC: 2.09 UIU/ML (ref 0.35–4.94)
UROBILINOGEN UR STRIP-ACNC: 0.2
VLDLC SERPL CALC-MCNC: 13 MG/DL
WBC # SPEC AUTO: 7.64 X10(3)/MCL (ref 4.5–11.5)
WBC #/AREA URNS AUTO: <5 /HPF

## 2023-08-10 PROCEDURE — 80053 COMPREHEN METABOLIC PANEL: CPT

## 2023-08-10 PROCEDURE — 81001 URINALYSIS AUTO W/SCOPE: CPT

## 2023-08-10 PROCEDURE — 84443 ASSAY THYROID STIM HORMONE: CPT

## 2023-08-10 PROCEDURE — 36415 COLL VENOUS BLD VENIPUNCTURE: CPT

## 2023-08-10 PROCEDURE — 84153 ASSAY OF PSA TOTAL: CPT

## 2023-08-10 PROCEDURE — 85025 COMPLETE CBC W/AUTO DIFF WBC: CPT

## 2023-08-10 PROCEDURE — 82306 VITAMIN D 25 HYDROXY: CPT

## 2023-08-10 PROCEDURE — 80061 LIPID PANEL: CPT

## 2023-08-14 ENCOUNTER — HOSPITAL ENCOUNTER (OUTPATIENT)
Dept: RADIOLOGY | Facility: HOSPITAL | Age: 78
Discharge: HOME | End: 2023-08-14
Attending: NURSE PRACTITIONER
Payer: MEDICARE

## 2023-08-14 ENCOUNTER — OFFICE VISIT (OUTPATIENT)
Dept: INTERNAL MEDICINE | Facility: CLINIC | Age: 78
End: 2023-08-14
Payer: MEDICARE

## 2023-08-14 VITALS
BODY MASS INDEX: 28.66 KG/M2 | RESPIRATION RATE: 16 BRPM | WEIGHT: 182.63 LBS | TEMPERATURE: 98 F | SYSTOLIC BLOOD PRESSURE: 121 MMHG | DIASTOLIC BLOOD PRESSURE: 69 MMHG | HEIGHT: 67 IN | HEART RATE: 89 BPM | OXYGEN SATURATION: 98 %

## 2023-08-14 DIAGNOSIS — Z00.00 WELL ADULT EXAM: Primary | ICD-10-CM

## 2023-08-14 DIAGNOSIS — N40.0 BENIGN PROSTATIC HYPERPLASIA, UNSPECIFIED WHETHER LOWER URINARY TRACT SYMPTOMS PRESENT: ICD-10-CM

## 2023-08-14 DIAGNOSIS — I10 PRIMARY HYPERTENSION: ICD-10-CM

## 2023-08-14 DIAGNOSIS — M25.552 LEFT HIP PAIN: ICD-10-CM

## 2023-08-14 DIAGNOSIS — E78.2 MIXED HYPERLIPIDEMIA: ICD-10-CM

## 2023-08-14 DIAGNOSIS — M19.90 OSTEOARTHRITIS, UNSPECIFIED OSTEOARTHRITIS TYPE, UNSPECIFIED SITE: ICD-10-CM

## 2023-08-14 DIAGNOSIS — M06.9 RHEUMATOID ARTHRITIS, INVOLVING UNSPECIFIED SITE, UNSPECIFIED WHETHER RHEUMATOID FACTOR PRESENT: ICD-10-CM

## 2023-08-14 DIAGNOSIS — I48.0 PAROXYSMAL ATRIAL FIBRILLATION: ICD-10-CM

## 2023-08-14 PROCEDURE — G0439 PR MEDICARE ANNUAL WELLNESS SUBSEQUENT VISIT: ICD-10-PCS | Mod: ,,, | Performed by: NURSE PRACTITIONER

## 2023-08-14 PROCEDURE — G0439 PPPS, SUBSEQ VISIT: HCPCS | Mod: ,,, | Performed by: NURSE PRACTITIONER

## 2023-08-14 PROCEDURE — 73502 X-RAY EXAM HIP UNI 2-3 VIEWS: CPT | Mod: TC,LT

## 2023-08-14 RX ORDER — AMLODIPINE AND BENAZEPRIL HYDROCHLORIDE 10; 20 MG/1; MG/1
1 CAPSULE ORAL
COMMUNITY
Start: 2023-07-27 | End: 2023-08-14

## 2023-08-14 RX ORDER — DICLOFENAC SODIUM 10 MG/G
2 GEL TOPICAL 3 TIMES DAILY
Qty: 50 G | Refills: 5 | Status: SHIPPED | OUTPATIENT
Start: 2023-08-14 | End: 2024-02-14

## 2023-08-14 NOTE — ASSESSMENT & PLAN NOTE
Lab Results   Component Value Date    .00 (H) 08/10/2023    TRIG 67 08/10/2023    HDL 50 08/10/2023    TOTALCHOLEST 5 08/10/2023     Stressed importance of dietary modifications. Follow a low cholesterol, low saturated fat diet with less that 200mg of cholesterol a day.  Avoid fried foods and high saturated fats (high saturated fats less than 7% of calories).  Add Flax Seed/Fish Oil supplements to diet. Increase dietary fiber.  Regular exercise can reduce LDL and raise HDL. Stressed importance of physical activity 5 times per week for 30 minutes per day.

## 2023-08-14 NOTE — ASSESSMENT & PLAN NOTE
Start Voltaren gel TID PRN. Discussed safety concerns with oral NSAIDs and OAC.  Take pain medications as prescribed.  Stay active. Having strong muscles takes the strain off of your joints, which can help reduce your pain.  Rest for several minutes when your pain is at its worst.  Goal BMI <30.   Alternate hot and cold packs as needed for pain relief.

## 2023-08-14 NOTE — ASSESSMENT & PLAN NOTE
Well controlled.   Continue Amlodipine 10mg / Benazepril 40mg daily    Low Sodium Diet (DASH Diet - Less than 2 grams of sodium per day).  Monitor blood pressure daily and log. Report consistent numbers greater than 140/90.  Maintain healthy weight with goal BMI <30. Exercise 30 minutes per day, 5 days per week.

## 2023-08-14 NOTE — PROGRESS NOTES
Patient ID: [id number]     Chief Complaint: Medicare Annual Wellness     HPI:     Deshaun Lang Jr. is a 78 y.o. male here today for a Medicare Annual Wellness visit and comprehensive Health Risk Assessment. Complaining of left hip pain for the past 10 days or so. Denies any inciting injury or trauma. He has rheumatoid arthritis and takes Plaquenil, prednisone, Tramadol and PRN ibuprofen. 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 Has not tried Voltaren gel. Reviewed and discussed lab results.       Health Maintenance   Topic Date Due    TETANUS VACCINE  2017    Shingles Vaccine (3 of 3) 2019    Lipid Panel  08/10/2028        Past Medical History:   Diagnosis Date    Carpal tunnel syndrome     Cataracts, bilateral     HLD (hyperlipidemia)     HTN (hypertension)     Paroxysmal atrial fibrillation     Rheumatoid arthritis, unspecified     Unspecified osteoarthritis, unspecified site         Past Surgical History:   Procedure Laterality Date    APPENDECTOMY      CARPAL TUNNEL RELEASE      CATARACT EXTRACTION      COLON SURGERY      EYE SURGERY      FORAMINOTOMY Left 2023    C3-4 anterior foraminotomy.  Dr. Chapman    JOINT REPLACEMENT  Both knees  2009    NEUROPLASTY  2012    TONSILLECTOMY  As a child    TOTAL KNEE ARTHROPLASTY  2017    TRANSURETHRAL RESECTION OF PROSTATE  2017        Social History     Socioeconomic History    Marital status:    Tobacco Use    Smoking status: Former     Current packs/day: 0.00     Types: Cigars     Start date: 6/15/1962     Quit date: 6/15/1972     Years since quittin.1    Smokeless tobacco: Never   Substance and Sexual Activity    Alcohol use: Not Currently    Drug use: Never    Sexual activity: Not Currently     Social Determinants of Health     Financial Resource Strain: Low Risk  (2023)    Overall Financial Resource Strain (CARDIA)     Difficulty of Paying Living  Expenses: Not hard at all   Food Insecurity: No Food Insecurity (8/14/2023)    Hunger Vital Sign     Worried About Running Out of Food in the Last Year: Never true     Ran Out of Food in the Last Year: Never true   Transportation Needs: No Transportation Needs (8/14/2023)    PRAPARE - Transportation     Lack of Transportation (Medical): No     Lack of Transportation (Non-Medical): No   Physical Activity: Insufficiently Active (8/14/2023)    Exercise Vital Sign     Days of Exercise per Week: 3 days     Minutes of Exercise per Session: 30 min   Social Connections: Socially Integrated (8/14/2023)    Social Connection and Isolation Panel [NHANES]     Frequency of Communication with Friends and Family: Three times a week     Frequency of Social Gatherings with Friends and Family: Three times a week     Attends [employer] Services: 1 to 4 times per year     Active Member of Clubs or Organizations: Yes     Attends Club or Organization Meetings: More than 4 times per year     Marital Status:    Housing Stability: Unknown (8/14/2023)    Housing Stability Vital Sign     Unable to Pay for Housing in the Last Year: No     Unstable Housing in the Last Year: No        Family History   Problem Relation Age of Onset    Arthritis Father     Hearing loss Father     Stroke Father     Alcohol abuse Maternal Uncle         Current Outpatient Medications   Medication Instructions    amLODIPine-benazepriL (LOTREL) 10-40 mg per capsule TAKE ONE CAPSULE ONCE DAILY    apixaban (ELIQUIS) 5 mg, Oral, 2 times daily    colestipoL (COLESTID) 1 gram Tab TAKE 1 TABLET BY MOUTH TWICE A DAY WITH A FULL GLASS OF WATER    diclofenac sodium (VOLTAREN) 2 g, Topical (Top), 3 times daily    eszopiclone (LUNESTA) 2 mg, Oral, Nightly    HYDROcodone-acetaminophen (NORCO) 7.5-325 mg per tablet 1 tablet, Oral, Every 6 hours PRN    hydrOXYchloroQUINE (PLAQUENIL) 200 mg, Oral, 2 times daily    ibuprofen (ADVIL,MOTRIN) 800 mg, Oral, Every 6 hours PRN     "ondansetron (ZOFRAN-ODT) 4 mg, Oral, Every 6 hours PRN    predniSONE (DELTASONE) 5 MG tablet Daily    sulfaSALAzine (AZULFIDINE) 500 mg Tab Oral    tamsulosin (FLOMAX) 0.4 mg Cap TAKE ONE CAPSULE BY MOUTH TWICE DAILY    traMADoL (ULTRAM) 50 mg tablet TAKE ONE TABLET EVERY 6 HOURS. AS NEEDED FOR PAIN    vitamin D (VITAMIN D3) 1,000 Units, Oral, Daily    zinc gluconate 50 mg, Oral, Daily       Review of patient's allergies indicates:   Allergen Reactions    Penicillins Other (See Comments)     Unknown what type of reaction      Statins-hmg-coa reductase inhibitors Other (See Comments)     Unknown what type of reaction        Immunization History   Administered Date(s) Administered    COVID-19, MRNA, LN-S, PF (MODERNA FULL 0.5 ML DOSE) 02/09/2021, 03/09/2021, 11/30/2021    Influenza - Trivalent - PF (ADULT) 11/07/2016, 11/06/2017, 11/07/2018, 11/18/2019    Influenza Whole 11/07/2012    Meningococcal Conjugate (MCV4P) 05/13/2013    Pneumococcal Conjugate - 13 Valent 05/13/2013, 06/12/2017    Pneumococcal Polysaccharide - 23 Valent 06/28/2018    Tdap 01/04/2007    Yellow Fever 01/04/2007    Zoster 07/26/2016, 06/24/2019, 09/06/2019    Zoster Recombinant 06/24/2019, 09/06/2019        Patient Care Team:  Lenard Miranda MD as PCP - General (Internal Medicine)  Gerardo Obando MD as Consulting Physician (Urology)  Gely Herman MD as Consulting Physician (Rheumatology)  Magdaleno Wei MD as Consulting Physician (Cardiology)    Subjective:     Review of Systems    12 point review of systems conducted, negative except as stated in the history of present illness. See HPI for details.    Objective:     Visit Vitals  /69 (BP Location: Right arm, Patient Position: Sitting)   Pulse 89   Temp 98 °F (36.7 °C)   Resp 16   Ht 5' 7" (1.702 m)   Wt 82.8 kg (182 lb 9.6 oz)   SpO2 98%   BMI 28.60 kg/m²       Physical Exam  Vitals and nursing note reviewed.   Constitutional:       General: He is not in acute distress.    "  Appearance: He is not ill-appearing.   HENT:      Head: Normocephalic and atraumatic.      Mouth/Throat:      Mouth: Mucous membranes are moist.      Pharynx: Oropharynx is clear.   Eyes:      General: No scleral icterus.     Extraocular Movements: Extraocular movements intact.      Conjunctiva/sclera: Conjunctivae normal.      Pupils: Pupils are equal, round, and reactive to light.   Neck:      Vascular: No carotid bruit.   Cardiovascular:      Rate and Rhythm: Normal rate. Rhythm irregularly irregular.      Heart sounds: No murmur heard.     No friction rub. No gallop.   Pulmonary:      Effort: Pulmonary effort is normal. No respiratory distress.      Breath sounds: Normal breath sounds. No wheezing, rhonchi or rales.   Abdominal:      General: Abdomen is flat. Bowel sounds are normal. There is no distension.      Palpations: Abdomen is soft. There is no mass.      Tenderness: There is no abdominal tenderness.   Musculoskeletal:         General: Normal range of motion.      Cervical back: Normal range of motion and neck supple.   Skin:     General: Skin is warm and dry.   Neurological:      General: No focal deficit present.      Mental Status: He is alert.   Psychiatric:         Mood and Affect: Mood normal.         Labs Reviewed:     Chemistry:  Lab Results   Component Value Date     08/10/2023    K 4.3 08/10/2023    CHLORIDE 109 (H) 08/10/2023    BUN 19.6 08/10/2023    CREATININE 0.97 08/10/2023    EGFRNORACEVR >60 08/10/2023    GLUCOSE 98 08/10/2023    CALCIUM 8.9 08/10/2023    ALKPHOS 97 08/10/2023    LABPROT 6.4 08/10/2023    ALBUMIN 3.9 08/10/2023    BILIDIR 0.3 02/08/2022    IBILI 0.30 02/08/2022    AST 24 08/10/2023    ALT 23 08/10/2023    QKMOBXDN18NB 52.7 08/10/2023    TSH 2.090 08/10/2023    PSA 0.61 08/10/2023        Lab Results   Component Value Date    HGBA1C 5.3 07/16/2020        Hematology:  Lab Results   Component Value Date    WBC 7.64 08/10/2023    HGB 15.4 08/10/2023    HCT 45.2  08/10/2023     08/10/2023       Lipid Panel:  Lab Results   Component Value Date    CHOL 225 (H) 08/10/2023    HDL 50 08/10/2023    .00 (H) 08/10/2023    TRIG 67 08/10/2023    TOTALCHOLEST 5 08/10/2023        Urine:  Lab Results   Component Value Date    COLORUA Yellow 08/10/2023    APPEARANCEUA Clear 08/10/2023    SGUA 1.015 08/10/2023    PHUA 6.5 08/10/2023    PROTEINUA Negative 08/10/2023    GLUCOSEUA Negative 08/10/2023    KETONESUA Negative 08/10/2023    BLOODUA Negative 08/10/2023    NITRITESUA Negative 08/10/2023    LEUKOCYTESUR Negative 08/10/2023    RBCUA <5 08/10/2023    WBCUA <5 08/10/2023    BACTERIA None Seen 08/10/2023    HYALINECASTS Moderate 02/08/2022        Assessment:       ICD-10-CM ICD-9-CM   1. Well adult exam  Z00.00 V70.0   2. Primary hypertension  I10 401.9   3. Mixed hyperlipidemia  E78.2 272.2   4. Benign prostatic hyperplasia, unspecified whether lower urinary tract symptoms present  N40.0 600.00   5. Osteoarthritis, unspecified osteoarthritis type, unspecified site  M19.90 715.90   6. Paroxysmal atrial fibrillation  I48.0 427.31   7. Rheumatoid arthritis, involving unspecified site, unspecified whether rheumatoid factor present  M06.9 714.0   8. Left hip pain  M25.552 719.45        Plan:     1. Well adult exam  Overview:  CVD Risk Factors - Reviewed  Obesity/Physical Activity - Normal BMI. Encouraged daily 30 minute physical activity x 5 days per week.      Prostate Cancer Screening -  Follow up annually.  Colon Cancer Screening - No longer indicated.   Osteoporosis Screening - No prior screening.   Eye Exam - Last Eye Exam - Dr. Vaughan.  Dental Exam - Recommend biannually  Vaccinations -   Immunization History   Administered Date(s) Administered    COVID-19, MRNA, LN-S, PF (MODERNA FULL 0.5 ML DOSE) 02/09/2021, 03/09/2021, 11/30/2021    Influenza - Trivalent - PF (ADULT) 11/07/2016, 11/06/2017, 11/07/2018, 11/18/2019    Influenza Whole 11/07/2012    Meningococcal Conjugate  (MCV4P) 05/13/2013    Pneumococcal Conjugate - 13 Valent 05/13/2013, 06/12/2017    Pneumococcal Polysaccharide - 23 Valent 06/28/2018    Tdap 01/04/2007    Yellow Fever 01/04/2007    Zoster 07/26/2016, 06/24/2019, 09/06/2019    Zoster Recombinant 06/24/2019, 09/06/2019            2. Primary hypertension  Assessment & Plan:  Well controlled.   Continue Amlodipine 10mg / Benazepril 40mg daily    Low Sodium Diet (DASH Diet - Less than 2 grams of sodium per day).  Monitor blood pressure daily and log. Report consistent numbers greater than 140/90.  Maintain healthy weight with goal BMI <30. Exercise 30 minutes per day, 5 days per week.        3. Mixed hyperlipidemia  Assessment & Plan:  Lab Results   Component Value Date    .00 (H) 08/10/2023    TRIG 67 08/10/2023    HDL 50 08/10/2023    TOTALCHOLEST 5 08/10/2023     Stressed importance of dietary modifications. Follow a low cholesterol, low saturated fat diet with less that 200mg of cholesterol a day.  Avoid fried foods and high saturated fats (high saturated fats less than 7% of calories).  Add Flax Seed/Fish Oil supplements to diet. Increase dietary fiber.  Regular exercise can reduce LDL and raise HDL. Stressed importance of physical activity 5 times per week for 30 minutes per day.         4. Benign prostatic hyperplasia, unspecified whether lower urinary tract symptoms present  Assessment & Plan:  Continue Flomax 0.4mg daily        5. Osteoarthritis, unspecified osteoarthritis type, unspecified site  Assessment & Plan:  Start Voltaren gel TID PRN. Discussed safety concerns with oral NSAIDs and OAC.  Take pain medications as prescribed.  Stay active. Having strong muscles takes the strain off of your joints, which can help reduce your pain.  Rest for several minutes when your pain is at its worst.  Goal BMI <30.   Alternate hot and cold packs as needed for pain relief.        6. Paroxysmal atrial fibrillation  Assessment & Plan:  Rate controlled.   Continue  Eliquis 5mg daily      7. Rheumatoid arthritis, involving unspecified site, unspecified whether rheumatoid factor present  Assessment & Plan:  Followed by Dr. Herman  Taking Plaquenil + Prednisone + Tramadol       8. Left hip pain  -     X-Ray Hip 2 or 3 views Left (with Pelvis when performed); Future; Expected date: 08/14/2023    Other orders  -     diclofenac sodium (VOLTAREN) 1 % Gel; Apply 2 g topically 3 (three) times daily.  Dispense: 50 g; Refill: 5         The following assessments were completed and reviewed. See completed screening forms and assessments within the Encounter Summary.  [x] Health Risk Assessment   [x] CVD Risk Factors - Reviewed  [x] Obesity/Physical Activity -  Encouraged daily 30 minute physical activity x 5 days per week.   [x] Home Safety/Living Situation  [x] CAGE  [x] Depression (PHQ) Screen  [x] Timed Get Up and Go  [x] Whisper Test  [x] Cognitive Function/Impairment Screen  [x] Nutrition Screening  [x] ADL Screen  [x] Opioid Screen:  [] Patient does not have a prescription for opioids.   [x] Patient has a prescription for opioids but is at low risk for abuse.   [x] Substance Abuse Screen:   [x] Patient does not use substances.   [x] Advanced Care Planning:  Advance Care Planning   Date: 08/14/2023    [facility]  I engaged the patient in a voluntary conversation about advance care planning and we specifically addressed what the goals of care would be moving forward.  We did specifically address the patient's likely prognosis, which is good .  We explored the patient's values and preferences for future care.  The patient endorses that what is most important right now is to focus on remaining as independent as possible    Accordingly, we have decided that the best plan to meet the patient's goals includes continuing with treatment          Provided patient with a 5-10 year written screening schedule and personal prevention plan. Recommendations were developed using the USPSTF age appropriate  recommendations. Education, counseling, and referrals were provided as needed. After Visit Summary printed and given to patient, which includes a list of additional screenings\tests needed.    Follow up in about 6 months (around 2/14/2024) for Routine Follow Up. In addition to their scheduled follow up, the patient has also been instructed to follow up on as needed basis.     Future Appointments   Date Time Provider Department Center   8/14/2023  2:45 PM [facility] XR1 DR Pacheco [facility]   2/14/2024  1:40 PM Lenard Miranda MD [facility] [address]        JOSE Fraga

## 2023-08-15 ENCOUNTER — TELEPHONE (OUTPATIENT)
Dept: INTERNAL MEDICINE | Facility: CLINIC | Age: 78
End: 2023-08-15
Payer: MEDICARE

## 2023-08-15 NOTE — TELEPHONE ENCOUNTER
Patient was informed about xray result. Patient wanted to know what he can do for the pain because he barley can walk. Patient wanted to know if he would be able to get an injection? Please advise?

## 2023-08-15 NOTE — PROGRESS NOTES
Please inform patient of results.    1. Mild arthritis to left hip    Thanks for all you do,    David

## 2023-08-15 NOTE — TELEPHONE ENCOUNTER
----- Message from JOSE Mcclain sent at 8/15/2023 10:51 AM CDT -----  Please inform patient of results.    1. Mild arthritis to left hip    Thanks for all you do,    David

## 2023-08-30 DIAGNOSIS — M19.90 OSTEOARTHRITIS, UNSPECIFIED OSTEOARTHRITIS TYPE, UNSPECIFIED SITE: ICD-10-CM

## 2023-08-30 RX ORDER — TRAMADOL HYDROCHLORIDE 50 MG/1
TABLET ORAL
Qty: 60 TABLET | Refills: 0 | Status: SHIPPED | OUTPATIENT
Start: 2023-08-30 | End: 2023-09-28

## 2023-09-19 RX ORDER — MONTELUKAST SODIUM 4 MG/1
TABLET, CHEWABLE ORAL
Qty: 180 TABLET | Refills: 3 | Status: SHIPPED | OUTPATIENT
Start: 2023-09-19

## 2023-09-28 DIAGNOSIS — M19.90 OSTEOARTHRITIS, UNSPECIFIED OSTEOARTHRITIS TYPE, UNSPECIFIED SITE: ICD-10-CM

## 2023-09-28 RX ORDER — TRAMADOL HYDROCHLORIDE 50 MG/1
TABLET ORAL
Qty: 60 TABLET | Refills: 0 | Status: SHIPPED | OUTPATIENT
Start: 2023-09-28 | End: 2023-10-26

## 2023-11-13 PROBLEM — Z00.00 WELL ADULT EXAM: Status: RESOLVED | Noted: 2022-08-09 | Resolved: 2023-11-13

## 2023-11-22 DIAGNOSIS — M19.90 OSTEOARTHRITIS, UNSPECIFIED OSTEOARTHRITIS TYPE, UNSPECIFIED SITE: ICD-10-CM

## 2023-11-22 RX ORDER — TRAMADOL HYDROCHLORIDE 50 MG/1
TABLET ORAL
Qty: 60 TABLET | Refills: 0 | Status: SHIPPED | OUTPATIENT
Start: 2023-11-22 | End: 2023-12-20

## 2023-12-20 DIAGNOSIS — M19.90 OSTEOARTHRITIS, UNSPECIFIED OSTEOARTHRITIS TYPE, UNSPECIFIED SITE: ICD-10-CM

## 2023-12-20 RX ORDER — TRAMADOL HYDROCHLORIDE 50 MG/1
TABLET ORAL
Qty: 60 TABLET | Refills: 0 | Status: SHIPPED | OUTPATIENT
Start: 2023-12-20 | End: 2024-01-16

## 2023-12-27 ENCOUNTER — OFFICE VISIT (OUTPATIENT)
Dept: INTERNAL MEDICINE | Facility: CLINIC | Age: 78
End: 2023-12-27
Payer: MEDICARE

## 2023-12-27 ENCOUNTER — TELEPHONE (OUTPATIENT)
Dept: INTERNAL MEDICINE | Facility: CLINIC | Age: 78
End: 2023-12-27

## 2023-12-27 VITALS
DIASTOLIC BLOOD PRESSURE: 84 MMHG | HEART RATE: 83 BPM | BODY MASS INDEX: 28.09 KG/M2 | WEIGHT: 179 LBS | RESPIRATION RATE: 16 BRPM | SYSTOLIC BLOOD PRESSURE: 136 MMHG | HEIGHT: 67 IN | TEMPERATURE: 98 F | OXYGEN SATURATION: 98 %

## 2023-12-27 DIAGNOSIS — Z79.899 DRUG-INDUCED IMMUNODEFICIENCY: ICD-10-CM

## 2023-12-27 DIAGNOSIS — R53.83 FATIGUE, UNSPECIFIED TYPE: ICD-10-CM

## 2023-12-27 DIAGNOSIS — J84.9 INTERSTITIAL PULMONARY DISEASE, UNSPECIFIED: Primary | ICD-10-CM

## 2023-12-27 DIAGNOSIS — D69.2 OTHER NONTHROMBOCYTOPENIC PURPURA: ICD-10-CM

## 2023-12-27 DIAGNOSIS — G47.10 HYPERSOMNOLENCE: ICD-10-CM

## 2023-12-27 DIAGNOSIS — M06.9 RHEUMATOID ARTHRITIS, INVOLVING UNSPECIFIED SITE, UNSPECIFIED WHETHER RHEUMATOID FACTOR PRESENT: ICD-10-CM

## 2023-12-27 DIAGNOSIS — D84.821 DRUG-INDUCED IMMUNODEFICIENCY: ICD-10-CM

## 2023-12-27 DIAGNOSIS — I48.0 PAROXYSMAL ATRIAL FIBRILLATION: ICD-10-CM

## 2023-12-27 PROCEDURE — 99215 OFFICE O/P EST HI 40 MIN: CPT | Mod: ,,, | Performed by: INTERNAL MEDICINE

## 2023-12-27 PROCEDURE — 99215 PR OFFICE/OUTPT VISIT, EST, LEVL V, 40-54 MIN: ICD-10-PCS | Mod: ,,, | Performed by: INTERNAL MEDICINE

## 2023-12-27 RX ORDER — AMLODIPINE AND BENAZEPRIL HYDROCHLORIDE 10; 20 MG/1; MG/1
CAPSULE ORAL DAILY
COMMUNITY
Start: 2023-11-29

## 2023-12-27 NOTE — TELEPHONE ENCOUNTER
----- Message from Lenard Miranda MD sent at 12/27/2023  4:46 PM CST -----  All of these labs are normal  Normal inflammatory markers  Blood sugar, etc  CT of the chest as ordered should be done  Referral was also placed to EP to look at ongoing symptomatic atrial fib as the cause of his symptoms

## 2023-12-27 NOTE — ASSESSMENT & PLAN NOTE
Patient reports normal recent cardiac workup with Cardiology.  Lexiscan was negative.  However still with ongoing chronic fatigue.  So we are going to check some labs today but I feel like he may need to visit with an electrophysiologist to discuss some options in regards to maybe rhythm controlling medications and are cardiac ablation consideration.  He states that his sister had to have that procedure done.  So referral has been placed to EP.

## 2023-12-27 NOTE — PROGRESS NOTES
Subjective:      Patient ID: Deshaun Lang Jr. is a 78 y.o. male.    Chief Complaint: Fatigue      HPI:  78 year-old  male presents today with complaints of  ongoing fatigue  Rheumatoid arthritis treated by Dr. Gely Herman with plaquenil   Dr. Qiu for optho  Atrial fib, hypertension, HLD, insmonia and BPH.  He is up-to-date with screening colonoscopy as well as PSA checks- Dr Obando  Patient is intolerant to statins and takes cholestyramine but does not take it as prescribed.  He works on as a restoration  on airplanes    Reports fatigue, had recent cardiac work up with [facility] that was normal  Had not had a CT of the chest  He gets winded with little activity  Reports worsening of rheumatoid symptoms  He does not sleep well      Past Medical History:  Past Medical History:   Diagnosis Date    Carpal tunnel syndrome     Cataracts, bilateral     HLD (hyperlipidemia)     HTN (hypertension)     Paroxysmal atrial fibrillation     Rheumatoid arthritis, unspecified     Unspecified osteoarthritis, unspecified site      Past Surgical History:   Procedure Laterality Date    APPENDECTOMY  1962    CARPAL TUNNEL RELEASE      CATARACT EXTRACTION      COLON SURGERY  1962    EYE SURGERY  2012    FORAMINOTOMY Left 02/24/2023    C3-4 anterior foraminotomy.  Dr. Chapman    JOINT REPLACEMENT  Both knees  2009    NEUROPLASTY  03/22/2012    TONSILLECTOMY  As a child    TOTAL KNEE ARTHROPLASTY  08/22/2017    TRANSURETHRAL RESECTION OF PROSTATE  08/30/2017     Review of patient's allergies indicates:   Allergen Reactions    Penicillins Other (See Comments)     Unknown what type of reaction      Statins-hmg-coa reductase inhibitors Other (See Comments)     Unknown what type of reaction     Current Outpatient Medications on File Prior to Visit   Medication Sig Dispense Refill    amlodipine-benazepril 10-20mg (LOTREL) 10-20 mg per capsule Take by mouth once daily.      apixaban (ELIQUIS) 5 mg Tab Take 1  tablet (5 mg total) by mouth 2 (two) times daily. 180 tablet 0    colestipoL (COLESTID) 1 gram Tab TAKE 1 TABLET BY MOUTH TWICE A DAY WITH A FULL GLASS OF WATER 180 tablet 3    diclofenac sodium (VOLTAREN) 1 % Gel Apply 2 g topically 3 (three) times daily. 50 g 5    eszopiclone (LUNESTA) 2 MG Tab Take 1 tablet (2 mg total) by mouth nightly. 30 tablet 5    hydrOXYchloroQUINE (PLAQUENIL) 200 mg tablet Take 200 mg by mouth 2 (two) times daily.      ibuprofen (ADVIL,MOTRIN) 800 MG tablet Take 1 tablet (800 mg total) by mouth every 6 (six) hours as needed for Pain (take with food or milk for mild-moderate pain). 20 tablet 0    ondansetron (ZOFRAN-ODT) 4 MG TbDL Take 1 tablet (4 mg total) by mouth every 6 (six) hours as needed (nausea). 20 tablet 0    predniSONE (DELTASONE) 5 MG tablet once daily.      sulfaSALAzine (AZULFIDINE) 500 mg Tab Take by mouth.      tamsulosin (FLOMAX) 0.4 mg Cap TAKE ONE CAPSULE BY MOUTH TWICE DAILY 120 capsule 3    traMADoL (ULTRAM) 50 mg tablet TAKE ONE TABLET EVERY 6 HOURS. AS NEEDED FOR PAIN 60 tablet 0    vitamin D (VITAMIN D3) 1000 units Tab Take 1,000 Units by mouth once daily.      zinc gluconate 50 mg tablet Take 50 mg by mouth once daily.      [DISCONTINUED] amLODIPine-benazepriL (LOTREL) 10-40 mg per capsule TAKE ONE CAPSULE ONCE DAILY 30 capsule 11     No current facility-administered medications on file prior to visit.     Social History     Socioeconomic History    Marital status:    Tobacco Use    Smoking status: Former     Types: Cigars     Start date: 6/15/1962     Quit date: 6/15/1972     Years since quittin.5    Smokeless tobacco: Never   Substance and Sexual Activity    Alcohol use: Not Currently    Drug use: Never    Sexual activity: Not Currently     Social Determinants of Health     Financial Resource Strain: Low Risk  (2023)    Overall Financial Resource Strain (CARDIA)     Difficulty of Paying Living Expenses: Not hard at all   Food Insecurity: No Food  "Insecurity (8/14/2023)    Hunger Vital Sign     Worried About Running Out of Food in the Last Year: Never true     Ran Out of Food in the Last Year: Never true   Transportation Needs: No Transportation Needs (8/14/2023)    PRAPARE - Transportation     Lack of Transportation (Medical): No     Lack of Transportation (Non-Medical): No   Physical Activity: Insufficiently Active (8/14/2023)    Exercise Vital Sign     Days of Exercise per Week: 3 days     Minutes of Exercise per Session: 30 min   Stress: No Stress Concern Present (12/27/2023)    [employer] - Occupational Stress Questionnaire     Feeling of Stress : Not at all   Social Connections: Socially Integrated (8/14/2023)    Social Connection and Isolation Panel [NHANES]     Frequency of Communication with Friends and Family: Three times a week     Frequency of Social Gatherings with Friends and Family: Three times a week     Attends Shinto Services: 1 to 4 times per year     Active Member of Clubs or Organizations: Yes     Attends Club or Organization Meetings: More than 4 times per year     Marital Status:    Housing Stability: Low Risk  (12/27/2023)    Housing Stability Vital Sign     Unable to Pay for Housing in the Last Year: No     Number of Places Lived in the Last Year: 1     Unstable Housing in the Last Year: No     Family History   Problem Relation Age of Onset    Arthritis Father     Hearing loss Father     Stroke Father     Alcohol abuse Maternal Uncle        Review of Systems  A comprehensive review of systems was performed and was negative with exception of what is documented above.     Objective:   /84 (BP Location: Left arm, Patient Position: Sitting, BP Method: Medium (Manual))   Pulse 83   Temp 97.9 °F (36.6 °C) (Temporal)   Resp 16   Ht 5' 7" (1.702 m)   Wt 81.2 kg (179 lb)   SpO2 98%   BMI 28.04 kg/m²   Physical Exam  General : Alert and oriented, No acute distress, afebrile.  Eye : PERRLA. " EOMI. Normal conjunctiva, Sclerae are nonicteric.  Respiratory : Respirations are non-labored and clear to auscultation bilaterally. Symmetrical air entry bilaterally, no crackles, no wheezes, no rhonchi. No cyanosis, no clubbing.  Cardiovascular : irregular irregular  Gastrointestinal : Soft, nontender, non-distended, bowel sounds are present in all quadrants, no organomegaly, no guarding, no rebound.  Musculoskeletal : Normal range of motion throughout. No muscle tenderness.  Integumentary : Warm, moist, intact.  Neurologic : Alert, Oriented  Psychiatric : Cooperative, Appropriate mood & affect.   Assessment/ Plan:   1. Interstitial pulmonary disease, unspecified  -     CT Chest With Contrast; Future; Expected date: 12/27/2023  -     Creatinine, serum; Future; Expected date: 12/27/2023  -     Cancel: Creatinine, serum; Future; Expected date: 12/27/2023  -     Basic Metabolic Panel; Future; Expected date: 12/27/2023  -     Hemoglobin A1C; Future; Expected date: 12/27/2023    2. Rheumatoid arthritis, involving unspecified site, unspecified whether rheumatoid factor present  -     Cancel: Sedimentation rate; Future; Expected date: 12/27/2023  -     C-Reactive Protein; Future; Expected date: 12/27/2023  -     TSH; Future; Expected date: 12/27/2023  -     C3 Complement; Future; Expected date: 12/27/2023  -     C4 Complement; Future; Expected date: 12/27/2023  -     Vitamin B12; Future; Expected date: 12/27/2023  -     Creatinine, serum; Future; Expected date: 12/27/2023  -     Cancel: Creatinine, serum; Future; Expected date: 12/27/2023  -     Ambulatory referral/consult to Cardiology; Future; Expected date: 01/03/2024  -     Hemoglobin A1C; Future; Expected date: 12/27/2023    3. Fatigue, unspecified type  -     TSH; Future; Expected date: 12/27/2023  -     Ambulatory referral/consult to Cardiology; Future; Expected date: 01/03/2024  -     Hemoglobin A1C; Future; Expected date: 12/27/2023  -     Ambulatory  referral/consult to Sleep Disorders; Future; Expected date: 01/03/2024    4. Drug-induced immunodeficiency  -     Hemoglobin A1C; Future; Expected date: 12/27/2023    5. Other nonthrombocytopenic purpura  -     Hemoglobin A1C; Future; Expected date: 12/27/2023    6. Paroxysmal atrial fibrillation  Assessment & Plan:  Patient reports normal recent cardiac workup with Cardiology.  Lexiscan was negative.  However still with ongoing chronic fatigue.  So we are going to check some labs today but I feel like he may need to visit with an electrophysiologist to discuss some options in regards to maybe rhythm controlling medications and are cardiac ablation consideration.  He states that his sister had to have that procedure done.  So referral has been placed to EP.        Orders:  -     Hemoglobin A1C; Future; Expected date: 12/27/2023  -     Ambulatory referral/consult to Sleep Disorders; Future; Expected date: 01/03/2024    7. Hypersomnolence  -     Ambulatory referral/consult to Sleep Disorders; Future; Expected date: 01/03/2024         Prolonged discussion with patient I feel like lots of his symptoms could be secondary to ongoing symptomatic paroxysmal atrial fibrillation.  So will get him referred over to EP for evaluation of course the labs that are mentioned as well as referral for sleep study, CT of the chestto rule out any underlying lung pathology given his RA    Follow up if symptoms worsen or fail to improve.

## 2024-01-02 DIAGNOSIS — G47.09 OTHER INSOMNIA: ICD-10-CM

## 2024-01-02 RX ORDER — ESZOPICLONE 2 MG/1
2 TABLET, FILM COATED ORAL NIGHTLY
Qty: 30 TABLET | Refills: 5 | Status: SHIPPED | OUTPATIENT
Start: 2024-01-02

## 2024-01-03 ENCOUNTER — TELEPHONE (OUTPATIENT)
Dept: INTERNAL MEDICINE | Facility: CLINIC | Age: 79
End: 2024-01-03
Payer: MEDICARE

## 2024-01-03 NOTE — TELEPHONE ENCOUNTER
----- Message from Cheryl Clay sent at 1/3/2024 12:34 PM CST -----  .Type:  Patient Returning Call    Who Called:pt   Who Left Message for Patient:  Does the patient know what this is regarding?:heart procedure   Would the patient rather a call back or a response via [facility]? Call back   Best Call Back Number:[phone]  Additional Information: Pt called because he has not heard back from the nurse or doctor. Pt states that he is scheduled for a heart ablation procedure.

## 2024-01-03 NOTE — TELEPHONE ENCOUNTER
Spoke to pt who stated that he has not received a call from Dr. Marcelo's office. I gave pt the number for Dr. Marcelo's office. [phone]. Pt requested that we call his office as well to check on the referral. I called Dr. Mracelo's office, spoke to Radha, they stated that 2/28/24 @ 8:40.

## 2024-01-15 DIAGNOSIS — M19.90 OSTEOARTHRITIS, UNSPECIFIED OSTEOARTHRITIS TYPE, UNSPECIFIED SITE: ICD-10-CM

## 2024-01-16 ENCOUNTER — TELEPHONE (OUTPATIENT)
Dept: INTERNAL MEDICINE | Facility: CLINIC | Age: 79
End: 2024-01-16
Payer: MEDICARE

## 2024-01-16 RX ORDER — TRAMADOL HYDROCHLORIDE 50 MG/1
TABLET ORAL
Qty: 60 TABLET | Refills: 0 | Status: SHIPPED | OUTPATIENT
Start: 2024-01-16 | End: 2024-02-14

## 2024-01-16 NOTE — TELEPHONE ENCOUNTER
----- Message from Aniya Jiang sent at 1/16/2024  9:20 AM CST -----  .Type:  Needs Medical Advice    Who Called: pt  Symptoms (please be specific):    How long has patient had these symptoms:    Pharmacy name and phone #:    Would the patient rather a call back or a response via [facility]? Call back   Best Call Back Number: [phone]  Additional Information: medications questions , didn't want to say to much about it

## 2024-01-31 ENCOUNTER — TELEPHONE (OUTPATIENT)
Dept: INTERNAL MEDICINE | Facility: CLINIC | Age: 79
End: 2024-01-31
Payer: MEDICARE

## 2024-01-31 DIAGNOSIS — E78.2 MIXED HYPERLIPIDEMIA: ICD-10-CM

## 2024-01-31 DIAGNOSIS — I10 PRIMARY HYPERTENSION: Primary | ICD-10-CM

## 2024-02-14 ENCOUNTER — OFFICE VISIT (OUTPATIENT)
Dept: INTERNAL MEDICINE | Facility: CLINIC | Age: 79
End: 2024-02-14
Payer: MEDICARE

## 2024-02-14 VITALS
HEART RATE: 63 BPM | BODY MASS INDEX: 28.25 KG/M2 | TEMPERATURE: 97 F | WEIGHT: 180 LBS | OXYGEN SATURATION: 98 % | RESPIRATION RATE: 16 BRPM | SYSTOLIC BLOOD PRESSURE: 130 MMHG | DIASTOLIC BLOOD PRESSURE: 84 MMHG | HEIGHT: 67 IN

## 2024-02-14 DIAGNOSIS — D69.2 OTHER NONTHROMBOCYTOPENIC PURPURA: ICD-10-CM

## 2024-02-14 DIAGNOSIS — I48.0 PAROXYSMAL ATRIAL FIBRILLATION: ICD-10-CM

## 2024-02-14 DIAGNOSIS — E78.2 MIXED HYPERLIPIDEMIA: ICD-10-CM

## 2024-02-14 DIAGNOSIS — M06.9 RHEUMATOID ARTHRITIS, INVOLVING UNSPECIFIED SITE, UNSPECIFIED WHETHER RHEUMATOID FACTOR PRESENT: ICD-10-CM

## 2024-02-14 DIAGNOSIS — M19.90 OSTEOARTHRITIS, UNSPECIFIED OSTEOARTHRITIS TYPE, UNSPECIFIED SITE: ICD-10-CM

## 2024-02-14 DIAGNOSIS — I70.0 AORTIC ATHEROSCLEROSIS: ICD-10-CM

## 2024-02-14 DIAGNOSIS — J84.9 INTERSTITIAL PULMONARY DISEASE, UNSPECIFIED: Primary | ICD-10-CM

## 2024-02-14 DIAGNOSIS — I10 PRIMARY HYPERTENSION: ICD-10-CM

## 2024-02-14 DIAGNOSIS — Z79.899 DRUG-INDUCED IMMUNODEFICIENCY: ICD-10-CM

## 2024-02-14 DIAGNOSIS — Z23 NEED FOR VACCINATION: ICD-10-CM

## 2024-02-14 DIAGNOSIS — D84.821 DRUG-INDUCED IMMUNODEFICIENCY: ICD-10-CM

## 2024-02-14 PROCEDURE — 90694 VACC AIIV4 NO PRSRV 0.5ML IM: CPT | Mod: ,,, | Performed by: INTERNAL MEDICINE

## 2024-02-14 PROCEDURE — G0008 ADMIN INFLUENZA VIRUS VAC: HCPCS | Mod: ,,, | Performed by: INTERNAL MEDICINE

## 2024-02-14 PROCEDURE — 99214 OFFICE O/P EST MOD 30 MIN: CPT | Mod: ,,, | Performed by: INTERNAL MEDICINE

## 2024-02-14 RX ORDER — BUTALB/ACETAMINOPHEN/CAFFEINE 50-325-40
1 TABLET ORAL 2 TIMES DAILY
COMMUNITY

## 2024-02-14 RX ORDER — TRAMADOL HYDROCHLORIDE 50 MG/1
TABLET ORAL
Qty: 60 TABLET | Refills: 0 | Status: SHIPPED | OUTPATIENT
Start: 2024-02-14 | End: 2024-03-14

## 2024-02-14 NOTE — PROGRESS NOTES
Internal Medicine    Patient ID: [id number]     Chief Complaint: Hypertension (6 month f/u) and Hyperlipidemia (6 month f/u)      HPI:     Deshaun Lang Jr. is a 78 y.o. male here today for a follow up.   Rheumatoid arthritis treated by Dr. Gely Herman with plaquenil   Dr. Qiu for optho  Atrial fib, hypertension, HLD, insmonia and BPH.  He is up-to-date with screening colonoscopy as well as PSA checks- Dr Obando  Patient is intolerant to statins and takes cholestyramine but does not take it as prescribed.  He works on as a restoration  on airplanes    Reports fatigue, had recent cardiac work up with [facility] that was normal  CT chest negative  He gets winded with little activity  Reports worsening of rheumatoid symptoms  He does not sleep well  He is pending an appointment with EP at the end of the month for ongoing symptomatic a fib    Past Medical History:   Diagnosis Date    Carpal tunnel syndrome     Cataracts, bilateral     HLD (hyperlipidemia)     HTN (hypertension)     Paroxysmal atrial fibrillation     Rheumatoid arthritis, unspecified     Unspecified osteoarthritis, unspecified site         Past Surgical History:   Procedure Laterality Date    APPENDECTOMY      CARPAL TUNNEL RELEASE      CATARACT EXTRACTION      COLON SURGERY      EYE SURGERY      FORAMINOTOMY Left 2023    C3-4 anterior foraminotomy.  Dr. Chapman    JOINT REPLACEMENT  Both knees  2009    NEUROPLASTY  2012    TONSILLECTOMY  As a child    TOTAL KNEE ARTHROPLASTY  2017    TRANSURETHRAL RESECTION OF PROSTATE  2017        Social History     Tobacco Use    Smoking status: Former     Types: Cigars     Start date: 6/15/1962     Quit date: 6/15/1972     Years since quittin.7    Smokeless tobacco: Never   Substance and Sexual Activity    Alcohol use: Not Currently    Drug use: Never    Sexual activity: Not Currently        Current Outpatient Medications   Medication Instructions     "amlodipine-benazepril 10-20mg (LOTREL) 10-20 mg per capsule Oral, Daily    apixaban (ELIQUIS) 5 mg, Oral, 2 times daily    calcium citrate-vitamin D3 315-200 mg (CITRACAL+D) 315 mg-5 mcg (200 unit) per tablet 1 tablet, Oral, 2 times daily    colestipoL (COLESTID) 1 gram Tab TAKE 1 TABLET BY MOUTH TWICE A DAY WITH A FULL GLASS OF WATER    eszopiclone (LUNESTA) 2 mg, Oral, Nightly, at bedtime.    hydroxychloroquine (PLAQUENIL) 200 mg, Oral, 2 times daily    ibuprofen (ADVIL,MOTRIN) 800 mg, Oral, Every 6 hours PRN    predniSONE (DELTASONE) 5 MG tablet Daily    tamsulosin (FLOMAX) 0.4 mg Cap TAKE ONE CAPSULE BY MOUTH TWICE DAILY    traMADoL (ULTRAM) 50 mg tablet TAKE ONE TABLET EVERY 6 HOURS. AS NEEDED FOR PAIN    vitamin D (VITAMIN D3) 1,000 Units, Oral, Daily    zinc gluconate 50 mg, Oral, Daily       Review of patient's allergies indicates:   Allergen Reactions    Penicillins Other (See Comments)     Unknown what type of reaction      Statins-hmg-coa reductase inhibitors Other (See Comments)     Unknown what type of reaction        Patient Care Team:  Lenard Miranda MD as PCP - General (Internal Medicine)  Gerardo Obando MD as Consulting Physician (Urology)  Gely Herman MD as Consulting Physician (Rheumatology)  Magdaleno Wei MD as Consulting Physician (Cardiology)     Subjective:     Review of Systems    12 point review of systems conducted, negative except as stated in the history of present illness. See HPI for details.    Objective:     Visit Vitals  /84 (BP Location: Left arm, Patient Position: Sitting, BP Method: Medium (Manual))   Pulse 63   Temp 97.3 °F (36.3 °C) (Temporal)   Resp 16   Ht 5' 7" (1.702 m)   Wt 81.6 kg (180 lb)   SpO2 98%   BMI 28.19 kg/m²       Physical Exam  General : Alert and oriented, No acute distress, afebrile.  Eye : PERRLA. EOMI. Normal conjunctiva, Sclerae are nonicteric  Respiratory : Respirations are non-labored and clear to auscultation bilaterally. " Symmetrical air entry bilaterally, no crackles, no wheezes, no rhonchi. No cyanosis, no clubbing.  Cardiovascular : Normal rate, Regular rhythm. No murmurs, rubs, or gallops. Pulses are 2+ throughout. No JVD. No Edema.  Musculoskeletal : Normal range of motion throughout. No muscle tenderness.  Integumentary : Warm, moist, intact.  Neurologic : Alert, Oriented, no FND  Psychiatric : Cooperative, Appropriate mood & affect.   Labs Reviewed:     Chemistry:  Lab Results   Component Value Date     12/27/2023    K 4.0 12/27/2023    CHLORIDE 107 12/27/2023    BUN 18.2 12/27/2023    CREATININE 1.06 12/27/2023    EGFRNORACEVR >60 12/27/2023    GLUCOSE 101 12/27/2023    CALCIUM 9.1 12/27/2023    ALKPHOS 97 08/10/2023    LABPROT 6.4 08/10/2023    ALBUMIN 3.9 08/10/2023    BILIDIR 0.3 02/08/2022    IBILI 0.30 02/08/2022    AST 24 08/10/2023    ALT 23 08/10/2023    NZDMUCKW92OR 52.7 08/10/2023    TSH 2.066 12/27/2023    PSA 0.61 08/10/2023        Lab Results   Component Value Date    HGBA1C 5.1 12/27/2023        Hematology:  Lab Results   Component Value Date    WBC 7.64 08/10/2023    HGB 15.4 08/10/2023    HCT 45.2 08/10/2023     08/10/2023       Lipid Panel:  Lab Results   Component Value Date    CHOL 225 (H) 08/10/2023    HDL 50 08/10/2023    .00 (H) 08/10/2023    TRIG 67 08/10/2023    TOTALCHOLEST 5 08/10/2023        Urine:  Lab Results   Component Value Date    COLORUA Yellow 08/10/2023    APPEARANCEUA Clear 08/10/2023    SGUA 1.015 08/10/2023    PHUA 6.5 08/10/2023    PROTEINUA Negative 08/10/2023    GLUCOSEUA Negative 08/10/2023    KETONESUA Negative 08/10/2023    BLOODUA Negative 08/10/2023    NITRITESUA Negative 08/10/2023    LEUKOCYTESUR Negative 08/10/2023    RBCUA <5 08/10/2023    WBCUA <5 08/10/2023    BACTERIA None Seen 08/10/2023    HYALINECASTS Moderate 02/08/2022        Assessment:       ICD-10-CM ICD-9-CM   1. Interstitial pulmonary disease, unspecified  J84.9 515   2. Aortic atherosclerosis   I70.0 440.0   3. Rheumatoid arthritis, involving unspecified site, unspecified whether rheumatoid factor present  M06.9 714.0   4. Paroxysmal atrial fibrillation  I48.0 427.31   5. Drug-induced immunodeficiency  D84.821 279.3    Z79.899 E947.9   6. Other nonthrombocytopenic purpura  D69.2 287.2   7. Need for vaccination  Z23 V05.9   8. Mixed hyperlipidemia  E78.2 272.2   9. Primary hypertension  I10 401.9        Plan:     1. Interstitial pulmonary disease, unspecified    2. Aortic atherosclerosis  Assessment & Plan:  Cont current HLD regimen        3. Rheumatoid arthritis, involving unspecified site, unspecified whether rheumatoid factor present    4. Paroxysmal atrial fibrillation  Assessment & Plan:  Patient with Paroxysmal (<7 days) atrial fibrillation which is controlled currently with Beta Blocker. Patient is currently in atrial fibrillation.[id number] Score: 2.   He is symptomatic, pending appt with EP at the end of the month      5. Drug-induced immunodeficiency    6. Other nonthrombocytopenic purpura    7. Need for vaccination  -     Influenza - Quadrivalent (Adjuvanted)    8. Mixed hyperlipidemia  Assessment & Plan:  Lab Results   Component Value Date    .00 (H) 08/10/2023    TRIG 67 08/10/2023    HDL 50 08/10/2023    TOTALCHOLEST 5 08/10/2023     Continue current med management  Stressed importance of dietary modifications. Follow a low cholesterol, low saturated fat diet with less that 200mg of cholesterol a day.  Avoid fried foods and high saturated fats (high saturated fats less than 7% of calories).  Add Flax Seed/Fish Oil supplements to diet. Increase dietary fiber.  Regular exercise can reduce LDL and raise HDL. Stressed importance of physical activity 5 times per week for 30 minutes per day.          9. Primary hypertension  Assessment & Plan:  Chronic, controlled. Latest blood pressure and vitals reviewed-     [unfilled].   Home meds for hypertension were reviewed and noted below.    Hypertension Medications               amlodipine-benazepril 10-20mg (LOTREL) 10-20 mg per capsule Take by mouth once daily.                   Follow up in about 6 months (around 8/14/2024) for WELL[facility], with labs prior to visit. In addition to their scheduled follow up, the patient has also been instructed to follow up on as needed basis.     Future Appointments   Date Time Provider Department Center   4/11/2024  2:00 PM Michael Vasquez MD [facility]   8/20/2024  2:20 PM Lenard Miranda MD [facility] [id number]        Lenard Miranda MD

## 2024-02-15 NOTE — ASSESSMENT & PLAN NOTE
Lab Results   Component Value Date    .00 (H) 08/10/2023    TRIG 67 08/10/2023    HDL 50 08/10/2023    TOTALCHOLEST 5 08/10/2023     Continue current med management  Stressed importance of dietary modifications. Follow a low cholesterol, low saturated fat diet with less that 200mg of cholesterol a day.  Avoid fried foods and high saturated fats (high saturated fats less than 7% of calories).  Add Flax Seed/Fish Oil supplements to diet. Increase dietary fiber.  Regular exercise can reduce LDL and raise HDL. Stressed importance of physical activity 5 times per week for 30 minutes per day.

## 2024-02-15 NOTE — ASSESSMENT & PLAN NOTE
Patient with Paroxysmal (<7 days) atrial fibrillation which is controlled currently with Beta Blocker. Patient is currently in atrial fibrillation.[id number] Score: 2.   He is symptomatic, pending appt with EP at the end of the month

## 2024-02-15 NOTE — ASSESSMENT & PLAN NOTE
Chronic, controlled. Latest blood pressure and vitals reviewed-     [unfilled].   Home meds for hypertension were reviewed and noted below.   Hypertension Medications               amlodipine-benazepril 10-20mg (LOTREL) 10-20 mg per capsule Take by mouth once daily.

## 2024-03-13 DIAGNOSIS — M19.90 OSTEOARTHRITIS, UNSPECIFIED OSTEOARTHRITIS TYPE, UNSPECIFIED SITE: ICD-10-CM

## 2024-03-14 RX ORDER — TRAMADOL HYDROCHLORIDE 50 MG/1
TABLET ORAL
Qty: 60 TABLET | Refills: 0 | Status: SHIPPED | OUTPATIENT
Start: 2024-03-14 | End: 2024-04-10

## 2024-04-10 DIAGNOSIS — M19.90 OSTEOARTHRITIS, UNSPECIFIED OSTEOARTHRITIS TYPE, UNSPECIFIED SITE: ICD-10-CM

## 2024-04-10 RX ORDER — TRAMADOL HYDROCHLORIDE 50 MG/1
TABLET ORAL
Qty: 60 TABLET | Refills: 0 | Status: SHIPPED | OUTPATIENT
Start: 2024-04-10 | End: 2024-05-09

## 2024-04-24 LAB
BUN SERPL-MCNC: 30 MG/DL
CALCIUM SERPL-MCNC: 9.6 MG/DL (ref 8.7–10.7)
MCV RBC AUTO: 87.9 FL
RBC # BLD AUTO: 4.89 10*6/UL
SODIUM BLD-SCNC: 144 MMOL/L (ref 137–147)
TOTAL PROTEIN: 6.4 G/DL (ref 6.4–8.2)
WBC: 10.7

## 2024-04-25 ENCOUNTER — DOCUMENTATION ONLY (OUTPATIENT)
Dept: INTERNAL MEDICINE | Facility: CLINIC | Age: 79
End: 2024-04-25
Payer: MEDICARE

## 2024-04-29 ENCOUNTER — TELEPHONE (OUTPATIENT)
Dept: INTERNAL MEDICINE | Facility: CLINIC | Age: 79
End: 2024-04-29
Payer: MEDICARE

## 2024-04-29 DIAGNOSIS — I10 PRIMARY HYPERTENSION: Primary | ICD-10-CM

## 2024-04-29 DIAGNOSIS — E78.2 MIXED HYPERLIPIDEMIA: ICD-10-CM

## 2024-04-29 RX ORDER — AMLODIPINE AND BENAZEPRIL HYDROCHLORIDE 10; 20 MG/1; MG/1
1 CAPSULE ORAL DAILY
Qty: 30 CAPSULE | Refills: 3 | Status: SHIPPED | OUTPATIENT
Start: 2024-04-29

## 2024-04-29 NOTE — TELEPHONE ENCOUNTER
----- Message from Yaquelin Christina sent at 4/29/2024 10:30 AM CDT -----  Type:  RX Refill Request    Who Called:  pt    Refill or New Rx: refill    RX Name and Strength: amlodipine-benazepril 10-20mg (LOTREL) 10-20 mg per capsule    How is the patient currently taking it? (ex. 1XDay): one day    Is this a 30 day or 90 day RX: 30    Preferred Pharmacy with phone number: [facility]    Local or Mail Order: local    Ordering Provider: roslyn    Would the patient rather a call back or a response via [facility]?      Best Call Back Number: [phone]    Additional Information: 2nd attempt pt has stated,  pt is completely out of medication and needs asap, please advise, thanks

## 2024-04-29 NOTE — TELEPHONE ENCOUNTER
----- Message from Yaquelin Christina sent at 4/29/2024 10:30 AM CDT -----  Type:  RX Refill Request    Who Called:  pt    Refill or New Rx: refill    RX Name and Strength: amlodipine-benazepril 10-20mg (LOTREL) 10-20 mg per capsule    How is the patient currently taking it? (ex. 1XDay): one day    Is this a 30 day or 90 day RX: 30    Preferred Pharmacy with phone number: [facility]    Local or Mail Order: local    Ordering Provider: roslyn    Would the patient rather a call back or a response via [facility]?      Best Call Back Number: [phone]    Additional Information: 2nd attempt pt has stated,  pt is completely out of medication and needs asap, please advise, thanks

## 2024-05-09 DIAGNOSIS — M19.90 OSTEOARTHRITIS, UNSPECIFIED OSTEOARTHRITIS TYPE, UNSPECIFIED SITE: ICD-10-CM

## 2024-05-09 RX ORDER — TRAMADOL HYDROCHLORIDE 50 MG/1
TABLET ORAL
Qty: 60 TABLET | Refills: 0 | Status: SHIPPED | OUTPATIENT
Start: 2024-05-09 | End: 2024-06-06

## 2024-06-06 DIAGNOSIS — G47.09 OTHER INSOMNIA: ICD-10-CM

## 2024-06-06 DIAGNOSIS — M19.90 OSTEOARTHRITIS, UNSPECIFIED OSTEOARTHRITIS TYPE, UNSPECIFIED SITE: ICD-10-CM

## 2024-06-06 RX ORDER — ESZOPICLONE 2 MG/1
2 TABLET, FILM COATED ORAL NIGHTLY
Qty: 30 TABLET | Refills: 5 | OUTPATIENT
Start: 2024-06-06

## 2024-06-06 RX ORDER — TRAMADOL HYDROCHLORIDE 50 MG/1
TABLET ORAL
Qty: 60 TABLET | Refills: 0 | Status: SHIPPED | OUTPATIENT
Start: 2024-06-06

## 2024-07-08 DIAGNOSIS — M19.90 OSTEOARTHRITIS, UNSPECIFIED OSTEOARTHRITIS TYPE, UNSPECIFIED SITE: ICD-10-CM

## 2024-07-08 DIAGNOSIS — G47.09 OTHER INSOMNIA: ICD-10-CM

## 2024-07-08 RX ORDER — TRAMADOL HYDROCHLORIDE 50 MG/1
TABLET ORAL
Qty: 60 TABLET | Refills: 0 | Status: SHIPPED | OUTPATIENT
Start: 2024-07-08

## 2024-07-08 RX ORDER — ESZOPICLONE 2 MG/1
2 TABLET, FILM COATED ORAL NIGHTLY
Qty: 30 TABLET | Refills: 5 | Status: SHIPPED | OUTPATIENT
Start: 2024-07-08

## 2024-07-29 DIAGNOSIS — I10 PRIMARY HYPERTENSION: ICD-10-CM

## 2024-07-29 DIAGNOSIS — N40.0 BENIGN PROSTATIC HYPERPLASIA, UNSPECIFIED WHETHER LOWER URINARY TRACT SYMPTOMS PRESENT: ICD-10-CM

## 2024-07-29 DIAGNOSIS — E78.2 MIXED HYPERLIPIDEMIA: ICD-10-CM

## 2024-07-29 RX ORDER — AMLODIPINE AND BENAZEPRIL HYDROCHLORIDE 10; 20 MG/1; MG/1
1 CAPSULE ORAL
Qty: 30 CAPSULE | Refills: 3 | Status: SHIPPED | OUTPATIENT
Start: 2024-07-29

## 2024-07-29 RX ORDER — TAMSULOSIN HYDROCHLORIDE 0.4 MG/1
CAPSULE ORAL
Qty: 120 CAPSULE | Refills: 3 | Status: SHIPPED | OUTPATIENT
Start: 2024-07-29

## 2024-08-06 ENCOUNTER — TELEPHONE (OUTPATIENT)
Dept: INTERNAL MEDICINE | Facility: CLINIC | Age: 79
End: 2024-08-06
Payer: MEDICARE

## 2024-08-06 DIAGNOSIS — M19.90 OSTEOARTHRITIS, UNSPECIFIED OSTEOARTHRITIS TYPE, UNSPECIFIED SITE: ICD-10-CM

## 2024-08-06 DIAGNOSIS — E78.2 MIXED HYPERLIPIDEMIA: ICD-10-CM

## 2024-08-06 DIAGNOSIS — E55.9 VITAMIN D DEFICIENCY: ICD-10-CM

## 2024-08-06 DIAGNOSIS — R73.09 ELEVATED GLUCOSE: ICD-10-CM

## 2024-08-06 DIAGNOSIS — N40.0 BENIGN PROSTATIC HYPERPLASIA, UNSPECIFIED WHETHER LOWER URINARY TRACT SYMPTOMS PRESENT: ICD-10-CM

## 2024-08-06 DIAGNOSIS — I10 PRIMARY HYPERTENSION: Primary | ICD-10-CM

## 2024-08-06 RX ORDER — TRAMADOL HYDROCHLORIDE 50 MG/1
TABLET ORAL
Qty: 60 TABLET | Refills: 0 | Status: SHIPPED | OUTPATIENT
Start: 2024-08-06

## 2024-08-13 ENCOUNTER — TELEPHONE (OUTPATIENT)
Dept: INTERNAL MEDICINE | Facility: CLINIC | Age: 79
End: 2024-08-13
Payer: MEDICARE

## 2024-08-13 DIAGNOSIS — E78.5 HYPERLIPIDEMIA, UNSPECIFIED HYPERLIPIDEMIA TYPE: ICD-10-CM

## 2024-08-13 DIAGNOSIS — I10 HYPERTENSION, UNSPECIFIED TYPE: ICD-10-CM

## 2024-08-13 NOTE — TELEPHONE ENCOUNTER
----- Message from Maryana Melchor sent at 8/13/2024  3:13 PM CDT -----  .Who Called: Deshaun Lang Jr.    Refill or New Rx:Refill  RX Name and Strength:apixaban (ELIQUIS) 5 mg Tab  How is the patient currently taking it? (ex. 1XDay):2xday  Is this a 30 day or 90 day RX:60  Local or Mail Order:local  List of preferred pharmacies on file (remove unneeded): [unfilled]  If different Pharmacy is requested, enter Pharmacy information here including location and phone number: same   Ordering Provider:Jesus      Preferred Method of Contact: Phone Call  Patient's Preferred Phone Number on File: [phone]   Best Call Back Number, if different:  Additional Information: need refills pharmacy been calling for it pt said

## 2024-08-20 ENCOUNTER — OFFICE VISIT (OUTPATIENT)
Dept: INTERNAL MEDICINE | Facility: CLINIC | Age: 79
End: 2024-08-20
Payer: MEDICARE

## 2024-08-20 VITALS
BODY MASS INDEX: 28.09 KG/M2 | DIASTOLIC BLOOD PRESSURE: 76 MMHG | TEMPERATURE: 98 F | HEIGHT: 67 IN | OXYGEN SATURATION: 99 % | RESPIRATION RATE: 16 BRPM | SYSTOLIC BLOOD PRESSURE: 124 MMHG | WEIGHT: 179 LBS | HEART RATE: 61 BPM

## 2024-08-20 DIAGNOSIS — Z00.00 MEDICARE ANNUAL WELLNESS VISIT, SUBSEQUENT: Primary | ICD-10-CM

## 2024-08-20 DIAGNOSIS — E78.2 MIXED HYPERLIPIDEMIA: ICD-10-CM

## 2024-08-20 DIAGNOSIS — M06.9 RHEUMATOID ARTHRITIS, INVOLVING UNSPECIFIED SITE, UNSPECIFIED WHETHER RHEUMATOID FACTOR PRESENT: ICD-10-CM

## 2024-08-20 DIAGNOSIS — I48.0 PAROXYSMAL ATRIAL FIBRILLATION: ICD-10-CM

## 2024-08-20 DIAGNOSIS — N40.0 BENIGN PROSTATIC HYPERPLASIA, UNSPECIFIED WHETHER LOWER URINARY TRACT SYMPTOMS PRESENT: ICD-10-CM

## 2024-08-20 DIAGNOSIS — I10 PRIMARY HYPERTENSION: ICD-10-CM

## 2024-08-20 PROBLEM — G57.02 PIRIFORMIS SYNDROME OF LEFT SIDE: Status: RESOLVED | Noted: 2022-06-29 | Resolved: 2024-08-20

## 2024-08-20 PROBLEM — G89.29 CHRONIC PAIN: Status: RESOLVED | Noted: 2023-03-29 | Resolved: 2024-08-20

## 2024-08-20 RX ORDER — DILTIAZEM HYDROCHLORIDE 240 MG/1
240 CAPSULE, COATED, EXTENDED RELEASE ORAL DAILY
COMMUNITY

## 2024-08-20 RX ORDER — FINASTERIDE 5 MG/1
5 TABLET, FILM COATED ORAL DAILY
Qty: 90 TABLET | Refills: 3 | Status: SHIPPED | OUTPATIENT
Start: 2024-08-20 | End: 2025-08-20

## 2024-08-20 NOTE — ASSESSMENT & PLAN NOTE
Chronic, controlled. Latest blood pressure and vitals reviewed-     [unfilled].   Home meds for hypertension were reviewed and noted below.   Hypertension Medications               amlodipine-benazepril 10-20mg (LOTREL) 10-20 mg per capsule TAKE ONE CAPSULE BY MOUTH ONCE DAILY    diltiaZEM (CARDIZEM CD) 240 MG 24 hr capsule Take 240 mg by mouth once daily.

## 2024-08-20 NOTE — ASSESSMENT & PLAN NOTE
Patient with Paroxysmal (<7 days) atrial fibrillation which is controlled currently with Beta Blocker and Calcium Channel Blocker. Patient is currently in atrial fibrillation.[id number] Score: 2. . Anticoagulation indicated. Anticoagulation done with NO[facility] .  Consideration for pacer and ablation?  She will be following up with EP in September

## 2024-08-20 NOTE — PROGRESS NOTES
Internal Medicine      Patient ID: [id number]     Chief Complaint: Medicare Annual Wellness     HPI:     Deshaun Lang Jr. is a 79 y.o. male here today for a Medicare Annual Wellness visit and comprehensive Health Risk Assessment.   Rheumatoid arthritis treated by Dr. Gely Herman with plaquenil   Dr. Qiu for optho  Atrial fib, hypertension, HLD, insmonia and BPH.  He is up-to-date with screening colonoscopy as well as PSA checks- Dr Obando  Patient is intolerant to statins and takes cholestyramine but does not take it as prescribed.  He works on as a restoration  on airplanes    Reports fatigue, had recent cardiac work up with [email or web address] that was normal  CT chest negative  He gets winded with little activity; he just does not understand why he does not have the endurance he used to and this is only been present for the past 6 months.  Saw electrophysiology who said that they would see him back in September and if symptoms persisted consider pacer and ablation.  Labs reviewed and stable      Health Maintenance         Date Due Completion Date    Sign Pain Contract Never done ---    Complete Opioid Risk Tool Never done ---    RSV Vaccine (Age 60+ and Pregnant patients) (1 - 1-dose 60+ series) Never done ---    TETANUS VACCINE 01/04/2017 1/4/2007    Shingles Vaccine (2 of 2) 11/01/2019 9/6/2019    COVID-19 Vaccine (3 - Moderna risk series) 12/28/2021 11/30/2021    Influenza Vaccine (1) 09/01/2024 2/14/2024    Lipid Panel 08/10/2028 8/10/2023             Past Medical History:   Diagnosis Date    Carpal tunnel syndrome     Cataracts, bilateral     HLD (hyperlipidemia)     HTN (hypertension)     Paroxysmal atrial fibrillation     Rheumatoid arthritis, unspecified     Unspecified osteoarthritis, unspecified site         Past Surgical History:   Procedure Laterality Date    APPENDECTOMY  1962    CARPAL TUNNEL RELEASE      CATARACT EXTRACTION      COLON SURGERY  1962    EYE SURGERY  2012    FORAMINOTOMY  Left 2023    C3-4 anterior foraminotomy.  Dr. Chapman    JOINT REPLACEMENT  Both knees  2009    NEUROPLASTY  2012    TONSILLECTOMY  As a child    TOTAL KNEE ARTHROPLASTY  2017    TRANSURETHRAL RESECTION OF PROSTATE  2017        Social History     Socioeconomic History    Marital status:    Tobacco Use    Smoking status: Former     Types: Cigars     Start date: 6/15/1962     Quit date: 6/15/1972     Years since quittin.2    Smokeless tobacco: Never   Substance and Sexual Activity    Alcohol use: Not Currently    Drug use: Never    Sexual activity: Not Currently     Social Determinants of Health     Financial Resource Strain: Low Risk  (2023)    Overall Financial Resource Strain (CARDIA)     Difficulty of Paying Living Expenses: Not hard at all   Food Insecurity: No Food Insecurity (2023)    Hunger Vital Sign     Worried About Running Out of Food in the Last Year: Never true     Ran Out of Food in the Last Year: Never true   Transportation Needs: No Transportation Needs (2023)    PRAPARE - Transportation     Lack of Transportation (Medical): No     Lack of Transportation (Non-Medical): No   Physical Activity: Insufficiently Active (2023)    Exercise Vital Sign     Days of Exercise per Week: 3 days     Minutes of Exercise per Session: 30 min   Stress: No Stress Concern Present (2023)    [employer] - Occupational Stress Questionnaire     Feeling of Stress : Not at all   Housing Stability: Low Risk  (2023)    Housing Stability Vital Sign     Unable to Pay for Housing in the Last Year: No     Number of Places Lived in the Last Year: 1     Unstable Housing in the Last Year: No        Family History   Problem Relation Name Age of Onset    Arthritis Father Deshaun sr     Hearing loss Father Deshaun sr     Stroke Father Deshaun sr     Alcohol abuse Maternal Uncle Heriberto aguilar         Current Outpatient Medications   Medication  Instructions    amlodipine-benazepril 10-20mg (LOTREL) 10-20 mg per capsule 1 capsule, Oral    apixaban (ELIQUIS) 5 mg, Oral, 2 times daily    calcium citrate-vitamin D3 315-200 mg (CITRACAL+D) 315 mg-5 mcg (200 unit) per tablet 1 tablet, Oral, 2 times daily    colestipoL (COLESTID) 1 gram Tab TAKE 1 TABLET BY MOUTH TWICE A DAY WITH A FULL GLASS OF WATER    diltiaZEM (CARDIZEM CD) 240 mg, Oral, Daily    eszopiclone (LUNESTA) 2 mg, Oral, Nightly, at bedtime.    finasteride (PROSCAR) 5 mg, Oral, Daily    hydroxychloroquine (PLAQUENIL) 200 mg, Oral, 2 times daily    ibuprofen (ADVIL,MOTRIN) 800 mg, Oral, Every 6 hours PRN    predniSONE (DELTASONE) 5 MG tablet Daily    tamsulosin (FLOMAX) 0.4 mg Cap TAKE ONE CAPSULE BY MOUTH TWICE DAILY    traMADoL (ULTRAM) 50 mg tablet TAKE ONE TABLET EVERY 6 HOURS AS NEEDED FOR PAIN.    vitamin D (VITAMIN D3) 1,000 Units, Oral, Daily    zinc gluconate 50 mg, Oral, Daily       Review of patient's allergies indicates:   Allergen Reactions    Penicillins Other (See Comments)     Unknown what type of reaction      Statins-hmg-coa reductase inhibitors Other (See Comments)     Unknown what type of reaction    Sulfasalazine Other (See Comments)        Immunization History   Administered Date(s) Administered    COVID-19 MRNA, LN-S PF (MODERNA HALF 0.25 ML DOSE) 11/30/2021    COVID-19, MRNA, LN-S, PF (MODERNA FULL 0.5 ML DOSE) 02/09/2021, 03/09/2021    Influenza (FLUAD) - Quadrivalent - Adjuvanted - PF *Preferred* (65+) 02/14/2024    Influenza - Trivalent - PF (ADULT) 11/07/2016, 11/06/2017, 11/07/2018, 11/18/2019    Influenza Whole 11/07/2012    Meningococcal Conjugate (MCV4P) 05/13/2013    Pneumococcal Conjugate - 13 Valent 05/13/2013, 06/12/2017    Pneumococcal Polysaccharide - 23 Valent 06/28/2018    Tdap 01/04/2007    Yellow Fever 01/04/2007    Zoster 07/26/2016, 06/24/2019, 09/06/2019    Zoster Recombinant 06/24/2019, 09/06/2019        Patient Care Team:  Lenard Miranda MD as  "PCP - General (Internal Medicine)  Gerardo Obando MD as Consulting Physician (Urology)  Gely Herman MD as Consulting Physician (Rheumatology)  Magdaleno Wei MD as Consulting Physician (Cardiology)    Subjective:     Review of Systems    12 point review of systems conducted, negative except as stated in the history of present illness. See HPI for details.    Objective:     Visit Vitals  /76 (BP Location: Right arm, Patient Position: Sitting, BP Method: Medium (Manual))   Pulse 61   Temp 97.6 °F (36.4 °C) (Temporal)   Resp 16   Ht 5' 7" (1.702 m)   Wt 81.2 kg (179 lb)   SpO2 99%   BMI 28.04 kg/m²       Physical Exam  Constitutional:       Appearance: Normal appearance.   HENT:      Head: Normocephalic and atraumatic.   Eyes:      Extraocular Movements: Extraocular movements intact.      Pupils: Pupils are equal, round, and reactive to light.   Cardiovascular:      Rate and Rhythm: Rhythm irregular.   Pulmonary:      Effort: Pulmonary effort is normal.      Breath sounds: Normal breath sounds.   Abdominal:      General: Bowel sounds are normal.      Palpations: Abdomen is soft.   Musculoskeletal:         General: Deformity present. Normal range of motion.   Skin:     General: Skin is warm and dry.   Neurological:      General: No focal deficit present.      Mental Status: He is alert.   Psychiatric:         Mood and Affect: Mood normal.           Labs Reviewed:     Chemistry:  Lab Results   Component Value Date     04/24/2024    K 4.0 12/27/2023    BUN 30 04/24/2024    CREATININE 1.06 12/27/2023    EGFRNORACEVR >60 12/27/2023    GLUCOSE 101 12/27/2023    CALCIUM 9.6 04/24/2024    ALKPHOS 97 08/10/2023    LABPROT 6.4 08/10/2023    ALBUMIN 3.9 08/10/2023    BILIDIR 0.3 02/08/2022    IBILI 0.30 02/08/2022    AST 24 08/10/2023    ALT 23 08/10/2023    MRPYZBIJ59LY 52.7 08/10/2023    TSH 2.066 12/27/2023    PSA 0.61 08/10/2023        Lab Results   Component Value Date    HGBA1C 5.1 12/27/2023    "     Hematology:  Lab Results   Component Value Date    WBC 10.7 04/24/2024    HGB 15.4 08/10/2023    HCT 45.2 08/10/2023     08/10/2023       Lipid Panel:  Lab Results   Component Value Date    CHOL 225 (H) 08/10/2023    HDL 50 08/10/2023    .00 (H) 08/10/2023    TRIG 67 08/10/2023    TOTALCHOLEST 5 08/10/2023        Urine:  Lab Results   Component Value Date    APPEARANCEUA Clear 08/10/2023    SGUA 1.015 08/10/2023    PROTEINUA Negative 08/10/2023    KETONESUA Negative 08/10/2023    LEUKOCYTESUR Negative 08/10/2023    RBCUA <5 08/10/2023    WBCUA <5 08/10/2023    BACTERIA None Seen 08/10/2023    HYALINECASTS Moderate 02/08/2022        Assessment:       ICD-10-CM ICD-9-CM   1. Medicare annual wellness visit, subsequent  Z00.00 V70.0   2. Paroxysmal atrial fibrillation  I48.0 427.31   3. Rheumatoid arthritis, involving unspecified site, unspecified whether rheumatoid factor present  M06.9 714.0   4. Benign prostatic hyperplasia, unspecified whether lower urinary tract symptoms present  N40.0 600.00   5. Primary hypertension  I10 401.9   6. Mixed hyperlipidemia  E78.2 272.2        Plan:     1. Medicare annual wellness visit, subsequent  Assessment & Plan:    General health maintenance education given, labs reviewed excellent.  Age-appropriate exams are up-to-date.  RTC 6 months, sooner if needed      2. Paroxysmal atrial fibrillation  Assessment & Plan:  Patient with Paroxysmal (<7 days) atrial fibrillation which is controlled currently with Beta Blocker and Calcium Channel Blocker. Patient is currently in atrial fibrillation.[id number] Score: 2. . Anticoagulation indicated. Anticoagulation done with NOAC .  Consideration for pacer and ablation?  She will be following up with EP in September      3. Rheumatoid arthritis, involving unspecified site, unspecified whether rheumatoid factor present  Overview:  Followed by Dr. Herman  Taking Plaquenil + Prednisone + Tramadol       4. Benign prostatic  hyperplasia, unspecified whether lower urinary tract symptoms present  Assessment & Plan:  DC Flomax trial of Proscar        5. Primary hypertension  Assessment & Plan:  Chronic, controlled. Latest blood pressure and vitals reviewed-     [unfilled].   Home meds for hypertension were reviewed and noted below.   Hypertension Medications               amlodipine-benazepril 10-20mg (LOTREL) 10-20 mg per capsule TAKE ONE CAPSULE BY MOUTH ONCE DAILY    diltiaZEM (CARDIZEM CD) 240 MG 24 hr capsule Take 240 mg by mouth once daily.              6. Mixed hyperlipidemia  Assessment & Plan:  Lab Results   Component Value Date    .00 (H) 08/10/2023    TRIG 67 08/10/2023    HDL 50 08/10/2023    TOTALCHOLEST 5 08/10/2023     Continue current med management  Stressed importance of dietary modifications. Follow a low cholesterol, low saturated fat diet with less that 200mg of cholesterol a day.  Avoid fried foods and high saturated fats (high saturated fats less than 7% of calories).  Add Flax Seed/Fish Oil supplements to diet. Increase dietary fiber.  Regular exercise can reduce LDL and raise HDL. Stressed importance of physical activity 5 times per week for 30 minutes per day.          Other orders  -     finasteride (PROSCAR) 5 mg tablet; Take 1 tablet (5 mg total) by mouth once daily.  Dispense: 90 tablet; Refill: 3         The following assessments were completed and reviewed. See completed screening forms and assessments within the Encounter Summary.   [x] Health Risk Assessment   [x] CVD Risk Factors   [x] Obesity/Physical Activity -  Encouraged daily 30 minute physical activity x 5 days per week.   [x] Home Safety/Living Situation   [x] Alcohol Screen  [x] Depression (PHQ) Screen   [x] Timed Get Up and Go   [x] Whisper Test  [x] Cognitive Function/Impairment Screen   [x] Nutrition Screening  [x] ADL Screen   [x] Opioid Screen:  [x] Patient does not have a prescription for opioids.   [] Patient has a prescription  for opioids but is at low risk for abuse.   [x] Substance Abuse Screen:   [x] Patient does not use illicit substances.   [] Patient screens positive for substance use disorder.   Advance Care Planning   Advance Care Planning     Date: 08/20/2024  Patient did not wish or was not able to name a surrogate decision maker or provide an Advance Care Plan.       I attest to discussing Advance Care Planning with patient and/or family member.  Education was provided including the importance of the Health Care Power of , Advance Directives, and/or [place] documentation.  The patient expressed understanding to the importance of this information and discussion.       Provided patient with a 5-10 year written screening schedule and personal prevention plan. Recommendations were developed using the USPSTF age appropriate recommendations. Education, counseling, and referrals were provided as needed. After Visit Summary printed and given to patient, which includes a list of additional screenings\tests needed.    Follow up in about 6 months (around 2/20/2025) for with labs prior to visit. In addition to their scheduled follow up, the patient has also been instructed to follow up on as needed basis.     No future appointments.     Lenard Miranda MD

## 2024-08-20 NOTE — ASSESSMENT & PLAN NOTE
General health maintenance education given, labs reviewed excellent.  Age-appropriate exams are up-to-date.  RTC 6 months, sooner if needed

## 2024-08-30 DIAGNOSIS — M19.90 OSTEOARTHRITIS, UNSPECIFIED OSTEOARTHRITIS TYPE, UNSPECIFIED SITE: ICD-10-CM

## 2024-08-30 RX ORDER — TRAMADOL HYDROCHLORIDE 50 MG/1
TABLET ORAL
Qty: 60 TABLET | Refills: 0 | Status: SHIPPED | OUTPATIENT
Start: 2024-08-30

## 2024-09-10 ENCOUNTER — TELEPHONE (OUTPATIENT)
Dept: INTERNAL MEDICINE | Facility: CLINIC | Age: 79
End: 2024-09-10
Payer: MEDICARE

## 2024-09-10 NOTE — TELEPHONE ENCOUNTER
Pt tested positive yesterday for covid with an at home test. Pt stated that his symptoms started yesterday as well. He would like to get some plaxlovid called to the pharmacy. Please advise

## 2024-09-10 NOTE — TELEPHONE ENCOUNTER
----- Message from Dana Chi sent at 9/10/2024 11:05 AM CDT -----  Who Called: Deshaun Lang Jr.    Caller is requesting assistance/information from provider's office.    Symptoms (please be specific): Tested positive for covid with home test(cough flu like symptoms     How long has patient had these symptoms:      List of preferred pharmacies on file (remove unneeded): [facility] [address]   Phone: [phone]  Fax: [phone]    Preferred Method of Contact: Phone Call    Patient's Preferred Phone Number on File: [phone]     Best Call Back Number, if different:    Additional Information: Deshaun would like a rx for plaxlovid. He is requesting a call back.

## 2024-09-12 DIAGNOSIS — E78.2 MIXED HYPERLIPIDEMIA: Primary | ICD-10-CM

## 2024-09-12 RX ORDER — MONTELUKAST SODIUM 4 MG/1
TABLET, CHEWABLE ORAL
Qty: 180 TABLET | Refills: 3 | Status: SHIPPED | OUTPATIENT
Start: 2024-09-12

## 2024-09-27 DIAGNOSIS — M19.90 OSTEOARTHRITIS, UNSPECIFIED OSTEOARTHRITIS TYPE, UNSPECIFIED SITE: ICD-10-CM

## 2024-09-27 RX ORDER — TRAMADOL HYDROCHLORIDE 50 MG/1
TABLET ORAL
Qty: 60 TABLET | Refills: 0 | Status: SHIPPED | OUTPATIENT
Start: 2024-09-27

## 2024-10-25 DIAGNOSIS — M19.90 OSTEOARTHRITIS, UNSPECIFIED OSTEOARTHRITIS TYPE, UNSPECIFIED SITE: ICD-10-CM

## 2024-10-25 RX ORDER — TRAMADOL HYDROCHLORIDE 50 MG/1
TABLET ORAL
Qty: 60 TABLET | Refills: 0 | Status: SHIPPED | OUTPATIENT
Start: 2024-10-25

## 2024-11-07 ENCOUNTER — HOSPITAL ENCOUNTER (INPATIENT)
Facility: HOSPITAL | Age: 79
LOS: 1 days | Discharge: HOME | DRG: 322 | End: 2024-11-08
Attending: STUDENT IN AN ORGANIZED HEALTH CARE EDUCATION/TRAINING PROGRAM | Admitting: INTERNAL MEDICINE
Payer: MEDICARE

## 2024-11-07 DIAGNOSIS — R07.9 CHEST PAIN: ICD-10-CM

## 2024-11-07 DIAGNOSIS — R00.1 BRADYCARDIA: ICD-10-CM

## 2024-11-07 DIAGNOSIS — I21.3 STEMI (ST ELEVATION MYOCARDIAL INFARCTION): ICD-10-CM

## 2024-11-07 DIAGNOSIS — I21.3 ST ELEVATION MYOCARDIAL INFARCTION (STEMI), UNSPECIFIED ARTERY: Primary | ICD-10-CM

## 2024-11-07 DIAGNOSIS — I10 HYPERTENSION, UNSPECIFIED TYPE: ICD-10-CM

## 2024-11-07 DIAGNOSIS — E78.5 HYPERLIPIDEMIA, UNSPECIFIED HYPERLIPIDEMIA TYPE: ICD-10-CM

## 2024-11-07 DIAGNOSIS — I25.10 CAD (CORONARY ARTERY DISEASE): ICD-10-CM

## 2024-11-07 LAB
APTT PPP: 34.4 SECONDS (ref 23.2–33.7)
BASOPHILS # BLD AUTO: 0.04 X10(3)/MCL
BASOPHILS NFR BLD AUTO: 0.3 %
CHOLEST SERPL-MCNC: 193 MG/DL
CHOLEST/HDLC SERPL: 4 {RATIO} (ref 0–5)
EOSINOPHIL # BLD AUTO: 0.06 X10(3)/MCL (ref 0–0.9)
EOSINOPHIL NFR BLD AUTO: 0.5 %
ERYTHROCYTE [DISTWIDTH] IN BLOOD BY AUTOMATED COUNT: 14.4 % (ref 11.5–17)
EST. AVERAGE GLUCOSE BLD GHB EST-MCNC: 119.8 MG/DL
HBA1C MFR BLD: 5.8 %
HCT VFR BLD AUTO: 40.3 % (ref 42–52)
HDLC SERPL-MCNC: 51 MG/DL (ref 35–60)
HGB BLD-MCNC: 14.1 G/DL (ref 14–18)
IMM GRANULOCYTES # BLD AUTO: 0.04 X10(3)/MCL (ref 0–0.04)
IMM GRANULOCYTES NFR BLD AUTO: 0.3 %
INR PPP: 1.4
LDLC SERPL CALC-MCNC: 129 MG/DL (ref 50–140)
LYMPHOCYTES # BLD AUTO: 4.61 X10(3)/MCL (ref 0.6–4.6)
LYMPHOCYTES NFR BLD AUTO: 37.7 %
MCH RBC QN AUTO: 31.3 PG (ref 27–31)
MCHC RBC AUTO-ENTMCNC: 35 G/DL (ref 33–36)
MCV RBC AUTO: 89.4 FL (ref 80–94)
MONOCYTES # BLD AUTO: 1.02 X10(3)/MCL (ref 0.1–1.3)
MONOCYTES NFR BLD AUTO: 8.3 %
NEUTROPHILS # BLD AUTO: 6.46 X10(3)/MCL (ref 2.1–9.2)
NEUTROPHILS NFR BLD AUTO: 52.9 %
NRBC BLD AUTO-RTO: 0 %
OHS QRS DURATION: 164 MS
OHS QTC CALCULATION: 442 MS
PLATELET # BLD AUTO: 233 X10(3)/MCL (ref 130–400)
PMV BLD AUTO: 9.9 FL (ref 7.4–10.4)
PROTHROMBIN TIME: 16.5 SECONDS (ref 12.5–14.5)
RA PRESSURE ESTIMATED: 15 MMHG
RBC # BLD AUTO: 4.51 X10(6)/MCL (ref 4.7–6.1)
TRIGL SERPL-MCNC: 66 MG/DL (ref 34–140)
TROPONIN I SERPL-MCNC: 0.11 NG/ML (ref 0–0.04)
TROPONIN I SERPL-MCNC: 1.92 NG/ML (ref 0–0.04)
VLDLC SERPL CALC-MCNC: 13 MG/DL
WBC # BLD AUTO: 12.23 X10(3)/MCL (ref 4.5–11.5)

## 2024-11-07 PROCEDURE — 25000003 PHARM REV CODE 250: Performed by: STUDENT IN AN ORGANIZED HEALTH CARE EDUCATION/TRAINING PROGRAM

## 2024-11-07 PROCEDURE — 027034Z DILATION OF CORONARY ARTERY, ONE ARTERY WITH DRUG-ELUTING INTRALUMINAL DEVICE, PERCUTANEOUS APPROACH: ICD-10-PCS | Performed by: STUDENT IN AN ORGANIZED HEALTH CARE EDUCATION/TRAINING PROGRAM

## 2024-11-07 PROCEDURE — 85730 THROMBOPLASTIN TIME PARTIAL: CPT | Performed by: STUDENT IN AN ORGANIZED HEALTH CARE EDUCATION/TRAINING PROGRAM

## 2024-11-07 PROCEDURE — B2111ZZ FLUOROSCOPY OF MULTIPLE CORONARY ARTERIES USING LOW OSMOLAR CONTRAST: ICD-10-PCS | Performed by: STUDENT IN AN ORGANIZED HEALTH CARE EDUCATION/TRAINING PROGRAM

## 2024-11-07 PROCEDURE — B240ZZ3 ULTRASONOGRAPHY OF SINGLE CORONARY ARTERY, INTRAVASCULAR: ICD-10-PCS | Performed by: STUDENT IN AN ORGANIZED HEALTH CARE EDUCATION/TRAINING PROGRAM

## 2024-11-07 PROCEDURE — 25500020 PHARM REV CODE 255: Performed by: STUDENT IN AN ORGANIZED HEALTH CARE EDUCATION/TRAINING PROGRAM

## 2024-11-07 PROCEDURE — 93005 ELECTROCARDIOGRAM TRACING: CPT

## 2024-11-07 PROCEDURE — 84484 ASSAY OF TROPONIN QUANT: CPT

## 2024-11-07 PROCEDURE — 80061 LIPID PANEL: CPT

## 2024-11-07 PROCEDURE — C1753 CATH, INTRAVAS ULTRASOUND: HCPCS | Performed by: STUDENT IN AN ORGANIZED HEALTH CARE EDUCATION/TRAINING PROGRAM

## 2024-11-07 PROCEDURE — 27000221 HC OXYGEN, UP TO 24 HOURS

## 2024-11-07 PROCEDURE — C1894 INTRO/SHEATH, NON-LASER: HCPCS | Performed by: STUDENT IN AN ORGANIZED HEALTH CARE EDUCATION/TRAINING PROGRAM

## 2024-11-07 PROCEDURE — 93799 UNLISTED CV SVC/PROCEDURE: CPT | Performed by: STUDENT IN AN ORGANIZED HEALTH CARE EDUCATION/TRAINING PROGRAM

## 2024-11-07 PROCEDURE — 99153 MOD SED SAME PHYS/QHP EA: CPT | Performed by: STUDENT IN AN ORGANIZED HEALTH CARE EDUCATION/TRAINING PROGRAM

## 2024-11-07 PROCEDURE — 25000003 PHARM REV CODE 250: Performed by: NURSE PRACTITIONER

## 2024-11-07 PROCEDURE — 63600175 PHARM REV CODE 636 W HCPCS: Performed by: STUDENT IN AN ORGANIZED HEALTH CARE EDUCATION/TRAINING PROGRAM

## 2024-11-07 PROCEDURE — 93010 ELECTROCARDIOGRAM REPORT: CPT | Mod: 76,,, | Performed by: INTERNAL MEDICINE

## 2024-11-07 PROCEDURE — 21400001 HC TELEMETRY ROOM

## 2024-11-07 PROCEDURE — 93010 ELECTROCARDIOGRAM REPORT: CPT | Mod: ,,, | Performed by: INTERNAL MEDICINE

## 2024-11-07 PROCEDURE — 27201423 OPTIME MED/SURG SUP & DEVICES STERILE SUPPLY: Performed by: STUDENT IN AN ORGANIZED HEALTH CARE EDUCATION/TRAINING PROGRAM

## 2024-11-07 PROCEDURE — C1874 STENT, COATED/COV W/DEL SYS: HCPCS | Performed by: STUDENT IN AN ORGANIZED HEALTH CARE EDUCATION/TRAINING PROGRAM

## 2024-11-07 PROCEDURE — 63600175 PHARM REV CODE 636 W HCPCS: Performed by: INTERNAL MEDICINE

## 2024-11-07 PROCEDURE — C9606 PERC D-E COR REVASC W AMI S: HCPCS | Mod: RC | Performed by: STUDENT IN AN ORGANIZED HEALTH CARE EDUCATION/TRAINING PROGRAM

## 2024-11-07 PROCEDURE — C1769 GUIDE WIRE: HCPCS | Performed by: STUDENT IN AN ORGANIZED HEALTH CARE EDUCATION/TRAINING PROGRAM

## 2024-11-07 PROCEDURE — 4A023N7 MEASUREMENT OF CARDIAC SAMPLING AND PRESSURE, LEFT HEART, PERCUTANEOUS APPROACH: ICD-10-PCS | Performed by: STUDENT IN AN ORGANIZED HEALTH CARE EDUCATION/TRAINING PROGRAM

## 2024-11-07 PROCEDURE — 36415 COLL VENOUS BLD VENIPUNCTURE: CPT

## 2024-11-07 PROCEDURE — 99285 EMERGENCY DEPT VISIT HI MDM: CPT | Mod: 25

## 2024-11-07 PROCEDURE — 85025 COMPLETE CBC W/AUTO DIFF WBC: CPT | Performed by: STUDENT IN AN ORGANIZED HEALTH CARE EDUCATION/TRAINING PROGRAM

## 2024-11-07 PROCEDURE — 93458 L HRT ARTERY/VENTRICLE ANGIO: CPT | Mod: XU | Performed by: STUDENT IN AN ORGANIZED HEALTH CARE EDUCATION/TRAINING PROGRAM

## 2024-11-07 PROCEDURE — 83036 HEMOGLOBIN GLYCOSYLATED A1C: CPT

## 2024-11-07 PROCEDURE — 92978 ENDOLUMINL IVUS OCT C 1ST: CPT | Performed by: STUDENT IN AN ORGANIZED HEALTH CARE EDUCATION/TRAINING PROGRAM

## 2024-11-07 PROCEDURE — 96375 TX/PRO/DX INJ NEW DRUG ADDON: CPT

## 2024-11-07 PROCEDURE — 4A033BC MEASUREMENT OF ARTERIAL PRESSURE, CORONARY, PERCUTANEOUS APPROACH: ICD-10-PCS | Performed by: STUDENT IN AN ORGANIZED HEALTH CARE EDUCATION/TRAINING PROGRAM

## 2024-11-07 PROCEDURE — C1887 CATHETER, GUIDING: HCPCS | Performed by: STUDENT IN AN ORGANIZED HEALTH CARE EDUCATION/TRAINING PROGRAM

## 2024-11-07 PROCEDURE — B2151ZZ FLUOROSCOPY OF LEFT HEART USING LOW OSMOLAR CONTRAST: ICD-10-PCS | Performed by: STUDENT IN AN ORGANIZED HEALTH CARE EDUCATION/TRAINING PROGRAM

## 2024-11-07 PROCEDURE — 84484 ASSAY OF TROPONIN QUANT: CPT | Performed by: STUDENT IN AN ORGANIZED HEALTH CARE EDUCATION/TRAINING PROGRAM

## 2024-11-07 PROCEDURE — C1725 CATH, TRANSLUMIN NON-LASER: HCPCS | Performed by: STUDENT IN AN ORGANIZED HEALTH CARE EDUCATION/TRAINING PROGRAM

## 2024-11-07 PROCEDURE — 96374 THER/PROPH/DIAG INJ IV PUSH: CPT

## 2024-11-07 PROCEDURE — 85610 PROTHROMBIN TIME: CPT | Performed by: STUDENT IN AN ORGANIZED HEALTH CARE EDUCATION/TRAINING PROGRAM

## 2024-11-07 PROCEDURE — 99152 MOD SED SAME PHYS/QHP 5/>YRS: CPT | Performed by: STUDENT IN AN ORGANIZED HEALTH CARE EDUCATION/TRAINING PROGRAM

## 2024-11-07 DEVICE — EVEROLIMUS-ELUTING PLATINUM CHROMIUM CORONARY STENT SYSTEM
Type: IMPLANTABLE DEVICE | Site: ARTERIAL | Status: FUNCTIONAL
Brand: SYNERGY™ XD

## 2024-11-07 RX ORDER — MORPHINE SULFATE 4 MG/ML
4 INJECTION, SOLUTION INTRAMUSCULAR; INTRAVENOUS
Status: COMPLETED | OUTPATIENT
Start: 2024-11-07 | End: 2024-11-07

## 2024-11-07 RX ORDER — ZOLPIDEM TARTRATE 5 MG/1
5 TABLET ORAL NIGHTLY PRN
Status: DISCONTINUED | OUTPATIENT
Start: 2024-11-07 | End: 2024-11-08 | Stop reason: HOSPADM

## 2024-11-07 RX ORDER — HEPARIN SODIUM,PORCINE/D5W 25000/250
0-40 INTRAVENOUS SOLUTION INTRAVENOUS CONTINUOUS
Status: DISCONTINUED | OUTPATIENT
Start: 2024-11-07 | End: 2024-11-07

## 2024-11-07 RX ORDER — HYDROCODONE BITARTRATE AND ACETAMINOPHEN 5; 325 MG/1; MG/1
1 TABLET ORAL EVERY 4 HOURS PRN
Status: DISCONTINUED | OUTPATIENT
Start: 2024-11-07 | End: 2024-11-08 | Stop reason: HOSPADM

## 2024-11-07 RX ORDER — MIDAZOLAM HYDROCHLORIDE 1 MG/ML
INJECTION INTRAMUSCULAR; INTRAVENOUS
Status: DISCONTINUED | OUTPATIENT
Start: 2024-11-07 | End: 2024-11-07 | Stop reason: HOSPADM

## 2024-11-07 RX ORDER — IOPAMIDOL 755 MG/ML
INJECTION, SOLUTION INTRAVASCULAR
Status: DISCONTINUED | OUTPATIENT
Start: 2024-11-07 | End: 2024-11-07 | Stop reason: HOSPADM

## 2024-11-07 RX ORDER — ACETAMINOPHEN 325 MG/1
650 TABLET ORAL EVERY 4 HOURS PRN
Status: DISCONTINUED | OUTPATIENT
Start: 2024-11-07 | End: 2024-11-08 | Stop reason: HOSPADM

## 2024-11-07 RX ORDER — ONDANSETRON HYDROCHLORIDE 2 MG/ML
4 INJECTION, SOLUTION INTRAVENOUS EVERY 8 HOURS PRN
Status: DISCONTINUED | OUTPATIENT
Start: 2024-11-07 | End: 2024-11-08 | Stop reason: HOSPADM

## 2024-11-07 RX ORDER — HEPARIN SODIUM 1000 [USP'U]/ML
INJECTION, SOLUTION INTRAVENOUS; SUBCUTANEOUS
Status: DISCONTINUED | OUTPATIENT
Start: 2024-11-07 | End: 2024-11-07 | Stop reason: HOSPADM

## 2024-11-07 RX ORDER — LIDOCAINE HYDROCHLORIDE 10 MG/ML
INJECTION, SOLUTION INFILTRATION; PERINEURAL
Status: DISCONTINUED | OUTPATIENT
Start: 2024-11-07 | End: 2024-11-07 | Stop reason: HOSPADM

## 2024-11-07 RX ORDER — HYDRALAZINE HYDROCHLORIDE 20 MG/ML
10 INJECTION INTRAMUSCULAR; INTRAVENOUS EVERY 4 HOURS PRN
Status: DISCONTINUED | OUTPATIENT
Start: 2024-11-07 | End: 2024-11-08 | Stop reason: HOSPADM

## 2024-11-07 RX ORDER — ASPIRIN 81 MG/1
81 TABLET ORAL DAILY
Status: DISCONTINUED | OUTPATIENT
Start: 2024-11-08 | End: 2024-11-08 | Stop reason: HOSPADM

## 2024-11-07 RX ORDER — CLOPIDOGREL BISULFATE 300 MG/1
TABLET, FILM COATED ORAL
Status: DISCONTINUED | OUTPATIENT
Start: 2024-11-07 | End: 2024-11-07 | Stop reason: HOSPADM

## 2024-11-07 RX ORDER — MORPHINE SULFATE 4 MG/ML
2 INJECTION, SOLUTION INTRAMUSCULAR; INTRAVENOUS EVERY 4 HOURS PRN
Status: DISCONTINUED | OUTPATIENT
Start: 2024-11-07 | End: 2024-11-08 | Stop reason: HOSPADM

## 2024-11-07 RX ORDER — SODIUM CHLORIDE 9 MG/ML
INJECTION, SOLUTION INTRAVENOUS CONTINUOUS
Status: ACTIVE | OUTPATIENT
Start: 2024-11-07 | End: 2024-11-07

## 2024-11-07 RX ORDER — FENTANYL CITRATE 50 UG/ML
INJECTION, SOLUTION INTRAMUSCULAR; INTRAVENOUS
Status: DISCONTINUED | OUTPATIENT
Start: 2024-11-07 | End: 2024-11-07 | Stop reason: HOSPADM

## 2024-11-07 RX ORDER — ALUMINUM HYDROXIDE, MAGNESIUM HYDROXIDE, AND SIMETHICONE 1200; 120; 1200 MG/30ML; MG/30ML; MG/30ML
SUSPENSION ORAL
Status: DISCONTINUED | OUTPATIENT
Start: 2024-11-07 | End: 2024-11-07 | Stop reason: HOSPADM

## 2024-11-07 RX ORDER — ONDANSETRON HYDROCHLORIDE 2 MG/ML
4 INJECTION, SOLUTION INTRAVENOUS
Status: COMPLETED | OUTPATIENT
Start: 2024-11-07 | End: 2024-11-07

## 2024-11-07 RX ORDER — CLOPIDOGREL BISULFATE 75 MG/1
75 TABLET ORAL DAILY
Status: DISCONTINUED | OUTPATIENT
Start: 2024-11-08 | End: 2024-11-08 | Stop reason: HOSPADM

## 2024-11-07 RX ORDER — NITROGLYCERIN 20 MG/100ML
INJECTION INTRAVENOUS
Status: DISCONTINUED | OUTPATIENT
Start: 2024-11-07 | End: 2024-11-07 | Stop reason: HOSPADM

## 2024-11-07 RX ADMIN — ONDANSETRON 4 MG: 2 INJECTION INTRAMUSCULAR; INTRAVENOUS at 12:11

## 2024-11-07 RX ADMIN — MORPHINE SULFATE 4 MG: 4 INJECTION INTRAVENOUS at 12:11

## 2024-11-07 RX ADMIN — ZOLPIDEM TARTRATE 5 MG: 5 TABLET ORAL at 08:11

## 2024-11-07 NOTE — BRIEF OP NOTE
[facility] - Cath Lab Services  Brief Operative Note    SUMMARY     Surgery Date: 11/7/2024     Surgeons and Role:     * Asa Glasgow Jr., MD - Primary     * Wilfredo Posada MD    Assisting Surgeon: None    Pre-op Diagnosis:  ST elevation myocardial infarction (STEMI), unspecified artery [I21.3]    Post-op Diagnosis:  Post-Op Diagnosis Codes:     * ST elevation myocardial infarction (STEMI), unspecified artery [I21.3]    Procedure(s) (LRB):  Left heart cath (N/A)    Anesthesia: RN IV Sedation    Implants:  Implant Name Type Inv. Item Serial No.  Lot No. LRB No. Used Action   STENT SYNERGY XD [id number] - [id number] stent coronary MARIA GUADALUPE - Balloon Exp STENT SYNERGY XD [id number]  [employer] [id number] Right 1 Implanted       Operative Findings: inferior STEMI; status post PCI with MARIA GUADALUPE to RCA;  - Tandem 50-60% stenoses to mid LAD, negative on iFR (0.92)    Estimated Blood Loss: * No values recorded between 11/7/2024 12:23 PM and 11/7/2024  1:15 PM *    Estimated Blood Loss has been documented.         Specimens:   Specimen (24h ago, onward)      None            [id number]

## 2024-11-07 NOTE — Clinical Note
The catheter was inserted into the and was inserted over the wire into the left ventricle. Hemodynamics were performed.  and Pullback was recorded.  EDP 6

## 2024-11-07 NOTE — Clinical Note
An angiography was performed of the right coronary arteries. The angiography was performed via power injection.  Post-intervention evaluation.

## 2024-11-07 NOTE — Clinical Note
The catheter was inserted into the, was removed from the and was inserted over the wire into the distal   right coronary artery. Hemodynamics were performed.  IVUS performed.

## 2024-11-07 NOTE — H&P
[facility] - Cath Lab Services    Cardiology  History and Physical     Patient Name: Deshaun Lang Jr.  MRN: [id number]  Admission Date: 11/7/2024  Code Status: No Order   Attending Provider: No att. providers found   Primary Care Physician: Lenard Miranda MD  Principal Problem:<principal problem not specified>    Patient information was obtained from patient, past medical records, and ER records.     Subjective:     Chief Complaint:  STEMI     HPI: Mr. Lang is an 79 year old male who is known to [facility], Dr. Wei. He presents to the ER with complaints of crushing left sided chest pain that radiates down his left arm that began approximately an hour ago. He reports that he woke up around 8:30 am this morning and had some mild chest discomfort, but it worsened prior to arrival. He reports associated symptoms of SOB but denies palpitations, nausea, vomiting, fever, or chills. An EKG was obtained and demonstrated ST elevation in leads 2, 3, & AVF with concern for inferior wall MI. Code STEMI was activated. He reports that he took his Eliquis this morning. He was loaded w/ ASA in the ER. He will be taken to the cath lab emergently for LHC.       PMH: DAPHNEY, HLD, Persistent Afib, HTN, RA  PSH: ankle surgery, shoulder surgery, knee surgery  Family History: Denies  Social History: Former tobacco use. Denies alcohol or illicit drug use.     Previous Cardiac Diagnostics:   ETT (6.17.24):  Stress EKG is non-diagnostic.   Maximal exercise treadmill test (MPHR : 86 %).  The functional capacity is poor (4.6 METs).  The heart rate recovery is normal.   The study quality is good.     MPI (4.3.24):  This is a normal perfusion study, no perfusion defects noted. There is no evidence of ischemia.   This scan is suggestive of low risk for future cardiovascular events.   The left ventricular cavity is noted to be normal on the stress studies. The stress left ventricular ejection fraction was calculated to  be 53% and left ventricular global function is normal. The rest left ventricular cavity is noted to be normal. The rest left ventricular ejection fraction was calculated to be 39% and rest left ventricular global function is moderately reduced.   When compared to the resting ejection fraction (39%), the stress ejection fraction (53%) has increased.   The study quality is good.   There was a rise in myocardial blood flow between rest and stress.  Global myocardial blood flow reserve was 2.80.  Normal global coronary flow reserve is suggestive of low coronary event risk.    Carotid US (7.5.23):  The study quality is average.   1-39% stenosis in the proximal right internal carotid artery based on Bluth Criteria.   1-39% stenosis in the proximal left internal carotid artery based on Bluth Criteria.   The right vertebral artery is poorly visualized.   Antegrade left vertebral artery flow.   Antegrade right vertebral artery flow.         Review of patient's allergies indicates:   Allergen Reactions    Penicillins Other (See Comments)     Unknown what type of reaction      Statins-hmg-coa reductase inhibitors Other (See Comments)     Unknown what type of reaction    Sulfasalazine Other (See Comments)       No current facility-administered medications on file prior to encounter.     Current Outpatient Medications on File Prior to Encounter   Medication Sig    amlodipine-benazepril 10-20mg (LOTREL) 10-20 mg per capsule TAKE ONE CAPSULE BY MOUTH ONCE DAILY    apixaban (ELIQUIS) 5 mg Tab Take 1 tablet (5 mg total) by mouth 2 (two) times daily.    calcium citrate-vitamin D3 315-200 mg (CITRACAL+D) 315 mg-5 mcg (200 unit) per tablet Take 1 tablet by mouth 2 (two) times daily.    colestipoL (COLESTID) 1 gram Tab TAKE 1 TABLET BY MOUTH TWICE A DAY WITH A FULL GLASS OF WATER    diltiaZEM (CARDIZEM CD) 240 MG 24 hr capsule Take 240 mg by mouth once daily.    eszopiclone (LUNESTA) 2 MG Tab TAKE ONE TABLET BY MOUTH EVERY NIGHT AT  BEDTIME    finasteride (PROSCAR) 5 mg tablet Take 1 tablet (5 mg total) by mouth once daily.    hydrOXYchloroQUINE (PLAQUENIL) 200 mg tablet Take 200 mg by mouth 2 (two) times daily.    ibuprofen (ADVIL,MOTRIN) 800 MG tablet Take 1 tablet (800 mg total) by mouth every 6 (six) hours as needed for Pain (take with food or milk for mild-moderate pain).    predniSONE (DELTASONE) 5 MG tablet once daily.    tamsulosin (FLOMAX) 0.4 mg Cap TAKE ONE CAPSULE BY MOUTH TWICE DAILY    traMADoL (ULTRAM) 50 mg tablet TAKE ONE TABLET EVERY 6 HOURS AS NEEDED FOR PAIN.    vitamin D (VITAMIN D3) 1000 units Tab Take 1,000 Units by mouth once daily.    zinc gluconate 50 mg tablet Take 50 mg by mouth once daily.       Review of Systems   Cardiovascular:  Positive for chest pain. Negative for palpitations.   Respiratory:  Positive for shortness of breath.        Objective:     Vital Signs (Most Recent):  Pulse: 86 (11/07/24 1203)  Resp: (!) 35 (11/07/24 1212)  BP: 124/71 (11/07/24 1203)  SpO2: 100 % (11/07/24 1203) Vital Signs (24h Range):  Pulse:  [86] 86  Resp:  [35] 35  SpO2:  [100 %] 100 %  BP: (124)/(71) 124/71     Weight: 79.4 kg (175 lb)  Body mass index is 27.41 kg/m².    SpO2: 100 %       No intake or output data in the 24 hours ending 11/07/24 1222    Lines/Drains/Airways       Peripheral Intravenous Line  Duration                  Peripheral IV - Single Lumen 11/07/24 1201 18 G Forearm <1 day         Peripheral IV - Single Lumen 11/07/24 1201 18 G Right Antecubital <1 day                    Physical Exam  HENT:      Head: Normocephalic.      Nose: Nose normal.      Mouth/Throat:      Mouth: Mucous membranes are moist.   Eyes:      Extraocular Movements: Extraocular movements intact.   Cardiovascular:      Rate and Rhythm: Normal rate and regular rhythm.      Pulses: Normal pulses.      Heart sounds: Murmur heard.   Pulmonary:      Effort: Pulmonary effort is normal.      Breath sounds: Normal breath sounds.   Abdominal:       Palpations: Abdomen is soft.   Skin:     General: Skin is warm and dry.   Neurological:      Mental Status: He is alert and oriented to person, place, and time.   Psychiatric:         Behavior: Behavior normal.         EKG:       Telemetry: SR    Home Medications:   No current facility-administered medications on file prior to encounter.     Current Outpatient Medications on File Prior to Encounter   Medication Sig Dispense Refill    amlodipine-benazepril 10-20mg (LOTREL) 10-20 mg per capsule TAKE ONE CAPSULE BY MOUTH ONCE DAILY 30 capsule 3    apixaban (ELIQUIS) 5 mg Tab Take 1 tablet (5 mg total) by mouth 2 (two) times daily. 180 tablet 0    calcium citrate-vitamin D3 315-200 mg (CITRACAL+D) 315 mg-5 mcg (200 unit) per tablet Take 1 tablet by mouth 2 (two) times daily.      colestipoL (COLESTID) 1 gram Tab TAKE 1 TABLET BY MOUTH TWICE A DAY WITH A FULL GLASS OF WATER 180 tablet 3    diltiaZEM (CARDIZEM CD) 240 MG 24 hr capsule Take 240 mg by mouth once daily.      eszopiclone (LUNESTA) 2 MG Tab TAKE ONE TABLET BY MOUTH EVERY NIGHT AT BEDTIME 30 tablet 5    finasteride (PROSCAR) 5 mg tablet Take 1 tablet (5 mg total) by mouth once daily. 90 tablet 3    hydrOXYchloroQUINE (PLAQUENIL) 200 mg tablet Take 200 mg by mouth 2 (two) times daily.      ibuprofen (ADVIL,MOTRIN) 800 MG tablet Take 1 tablet (800 mg total) by mouth every 6 (six) hours as needed for Pain (take with food or milk for mild-moderate pain). 20 tablet 0    predniSONE (DELTASONE) 5 MG tablet once daily.      tamsulosin (FLOMAX) 0.4 mg Cap TAKE ONE CAPSULE BY MOUTH TWICE DAILY 120 capsule 3    traMADoL (ULTRAM) 50 mg tablet TAKE ONE TABLET EVERY 6 HOURS AS NEEDED FOR PAIN. 60 tablet 0    vitamin D (VITAMIN D3) 1000 units Tab Take 1,000 Units by mouth once daily.      zinc gluconate 50 mg tablet Take 50 mg by mouth once daily.       Current Schedule Inpatient Medications:   heparin (PORCINE)  56 Units/kg (Adjusted) Intravenous Once     Continuous  "Infusions:   heparin (porcine) in D5W  0-40 Units/kg/hr (Adjusted) Intravenous Continuous         Significant Labs:   Chemistries:   No results for input(s): "NA", "K", "CL", "CO2", "BUN", "CREATININE", "CALCIUM", "PROT", "BILITOT", "ALKPHOS", "ALT", "AST", "GLUCOSE", "MG", "PHOS", "TROPONINI" in the last 168 hours.    Invalid input(s): "LABALBU"     CBC/Anemia Labs: Coags:    Recent Labs   Lab 11/07/24  1205   WBC 12.23*   HGB 14.1   HCT 40.3*      MCV 89.4   RDW 14.4    No results for input(s): "PT", "INR", "APTT" in the last 168 hours.     Significant Imaging: X-Ray: CXR: X-Ray Chest 1 View (CXR): No results found for this visit on 11/07/24. and X-Ray Chest PA and Lateral (CXR): No results found for this visit on 11/07/24.  Assessment and Plan:   ASSESSMENT:  STEMI   Persistent AF    - CHADsVASc - 3 Points - 3.2% Stroke Risk per Year     - On Eliquis outpatient (Last dose 11.7.24 AM)  HTN  HLD  RA  No Hx of GIB    PLAN:  Take emergently to cath lab.  Trend troponin x 3.  Start ASA 81 mg daily. Was loaded w/  mg.   He took Eliquis this AM. Hold off on heparin (discussed with Dr. Posada).   Obtain lipid panel & A1C.  Obtain echo.  Labs and EKG in the AM: CBC, CMP, & Mg.       VTE Risk Mitigation (From admission, onward)           Ordered     heparin 25,000 units in dextrose 5% (100 units/ml) IV bolus from bag LOW INTENSITY nomogram - [facility]  As needed (PRN)        Question:  Heparin Infusion Adjustment (DO NOT MODIFY ANSWER)  Answer:  [email or web address]\Images\Pharmacy\HeparinInfusions\heparin LOW INTENSITY nomogram for [facility] [id number]    11/07/24 1207     heparin 25,000 units in dextrose 5% (100 units/ml) IV bolus from bag LOW INTENSITY nomogram - [facility]  As needed (PRN)        Question:  Heparin Infusion Adjustment (DO NOT MODIFY ANSWER)  Answer:  [email or web address]\[email or web address]\Images\Pharmacy\HeparinInfusions\heparin LOW INTENSITY nomogram for [facility] [id number]    11/07/24 1207     heparin 25,000 units in dextrose 5% " (100 units/ml) IV bolus from bag LOW INTENSITY nomogram - [facility]  Once        Question:  Heparin Infusion Adjustment (DO NOT MODIFY ANSWER)  Answer:  [email or web address]\epic\Images\Pharmacy\HeparinInfusions\heparin LOW INTENSITY nomogram for [facility] [id number]    11/07/24 1207     heparin 25,000 units in dextrose 5% 250 mL (100 units/mL) infusion LOW INTENSITY nomogram - [facility]  Continuous        Question:  Begin at (units/kg/hr)  Answer:  12    11/07/24 1207                    JOSE Gillette  Cardiology  [facility] - Cath Lab Services  11/07/2024 12:22 PM     I agree with the findings of the complexity of problems addressed and take responsibility for the plan's risks and complications. I approved the plan documented by Lauren Montoya NP.  Acute IMI to cath lab

## 2024-11-07 NOTE — Clinical Note
The wire was placed across the lesion into the middle left anterior descending artery. iFR performed-0.92.

## 2024-11-07 NOTE — ED PROVIDER NOTES
Encounter Date: 11/7/2024       History     Chief Complaint   Patient presents with    Chest Pain     Via [facility] for chest pain, EMS 12 lead showing inferior MI. Given 0.4 SL nitro and 324 aspirin en route. To ED 31, Dr. Raymond at bedside. See EMR for further charting     This is an 79-year-old male medical history significant for hypertension hyperlipidemia, AFib who presents emergency department chief complaint of chest pain.  Patient reports he woke up this morning at approximally 830.  He reports having some mild chest discomfort then.  However proximally 45 minutes PTA begins to have significant worsening, crushing left-sided chest pain that radiates down he was left arm.  No nausea and vomiting.  It was associated with some shortness of breath.  He reports he was diaphoretic prior to arrival.  States he was a cardiac history of AFib.  Did take his Eliquis this morning.  EMS applied nitro paste and gave patient aspirin prior to arrival.    The history is provided by the patient and the EMS personnel.     Review of patient's allergies indicates:   Allergen Reactions    Penicillins Other (See Comments)     Unknown what type of reaction      Statins-hmg-coa reductase inhibitors Other (See Comments)     Unknown what type of reaction    Sulfasalazine Other (See Comments)     Past Medical History:   Diagnosis Date    Carpal tunnel syndrome     Cataracts, bilateral     HLD (hyperlipidemia)     HTN (hypertension)     Paroxysmal atrial fibrillation     Rheumatoid arthritis, unspecified     Unspecified osteoarthritis, unspecified site      Past Surgical History:   Procedure Laterality Date    APPENDECTOMY  1962    CARPAL TUNNEL RELEASE      CATARACT EXTRACTION      COLON SURGERY  1962    EYE SURGERY  2012    FORAMINOTOMY Left 02/24/2023    C3-4 anterior foraminotomy.  Dr. Chapman    JOINT REPLACEMENT  Both knees  2009    NEUROPLASTY  03/22/2012    TONSILLECTOMY  As a child    TOTAL KNEE ARTHROPLASTY  08/22/2017     TRANSURETHRAL RESECTION OF PROSTATE  2017     Family History   Problem Relation Name Age of Onset    Arthritis Father Deshaun holden     Hearing loss Father Deshaun holden     Stroke Father Deshaun holden     Alcohol abuse Maternal Uncle Heriberto aguilar      Social History     Tobacco Use    Smoking status: Former     Types: Cigars     Start date: 6/15/1962     Quit date: 6/15/1972     Years since quittin.4    Smokeless tobacco: Never   Substance Use Topics    Alcohol use: Not Currently    Drug use: Never     Review of Systems   Constitutional:  Negative for chills and fever.   Respiratory:  Positive for chest tightness and shortness of breath.    Cardiovascular:  Positive for chest pain.   Gastrointestinal:  Negative for nausea and vomiting.       Physical Exam     Initial Vitals   BP Pulse Resp Temp SpO2   24 1203 24 1203 24 1203 24 1343 24 1203   124/71 86 (!) 35 97.4 °F (36.3 °C) 100 %      MAP       --                Physical Exam    Constitutional: He appears well-developed and well-nourished. He is not diaphoretic. He appears distressed (Patient appears uncomfortable).   HENT:   Head: Normocephalic and atraumatic.   Right Ear: External ear normal.   Left Ear: External ear normal.   Nose: Nose normal.   Eyes: EOM are normal. Pupils are equal, round, and reactive to light. Right eye exhibits no discharge. Left eye exhibits no discharge.   Cardiovascular:  Normal rate, regular rhythm and normal heart sounds.     Exam reveals no gallop and no friction rub.       No murmur heard.  Pulmonary/Chest: Effort normal and breath sounds normal. No respiratory distress. He has no wheezes. He has no rhonchi. He has no rales. He exhibits no tenderness.   Abdominal: Abdomen is soft. Bowel sounds are normal. He exhibits no distension and no mass. There is no abdominal tenderness. There is no rebound and no guarding.   Musculoskeletal:         General: No edema. Normal range of motion.     Neurological: He is  alert and oriented to person, place, and time. No cranial nerve deficit or sensory deficit.   Skin: Skin is warm and dry. Capillary refill takes less than 2 seconds. There is pallor.         ED Course   Critical Care    Date/Time: 11/7/2024 12:14 PM    Performed by: Jeremiah Raymond MD  Authorized by: Jeremiah Raymond MD  Direct patient critical care time: 9 minutes  Additional history critical care time: 7 minutes  Ordering / reviewing critical care time: 8 minutes  Documentation critical care time: 5 minutes  Consulting other physicians critical care time: 6 minutes  Total critical care time (exclusive of procedural time) : 35 minutes  Critical care time was exclusive of separately billable procedures and treating other patients.  Critical care was necessary to treat or prevent imminent or life-threatening deterioration of the following conditions: cardiac failure.  Critical care was time spent personally by me on the following activities: development of treatment plan with patient or surrogate, discussions with consultants, interpretation of cardiac output measurements, evaluation of patient's response to treatment, examination of patient, obtaining history from patient or surrogate, ordering and performing treatments and interventions, ordering and review of laboratory studies, ordering and review of radiographic studies, pulse oximetry, review of old charts and re-evaluation of patient's condition.  Comments: STEMI, inferior, to cath lab w/ [facility]/Cards        Labs Reviewed          Imaging Results              X-Ray Chest 1 View (In process)                      Medications   clopidogreL tablet 75 mg (has no administration in time range)   0.9%  NaCl infusion (has no administration in time range)   acetaminophen tablet 650 mg (has no administration in time range)   HYDROcodone-acetaminophen 5-325 mg per tablet 1 tablet (has no administration in time range)   morphine injection 2 mg (has no administration in  time range)   ondansetron injection 4 mg (has no administration in time range)   hydrALAZINE injection 10 mg (has no administration in time range)   aspirin EC tablet 81 mg (has no administration in time range)   morphine injection 4 mg (4 mg Intravenous Given 11/7/24 1212)   ondansetron injection 4 mg (4 mg Intravenous Given 11/7/24 1212)     Medical Decision Making  Differential diagnosis to include but not limited to Chest pain emergent diagnoses: ACS, PE, dissection, cardiac tamponade, tension pneumothorax.  Chest pain non-emergent diagnoses: Musculoskeletal, trauma, pleurisy, pneumonia, pleural effusion, GERD, other GI, neurologic, psychiatric and other non emergent diagnoses considered.        Patient was arrives via EMS.  Appears to be very uncomfortable.  Complain of chest pain that started earlier this morning but acutely worsening proximally 45 minutes PTA he was substernal.  Radiating to left arm.  History of AFib but no known CAD.  EKG concerning for inferior STEMI.  Code STEMI called.  Seen evaluated by Cardiology asked that he was started heparin will take emergently to cath lab for further evaluation.    Amount and/or Complexity of Data Reviewed  Labs: ordered. Decision-making details documented in ED Course.  Radiology: ordered.    Risk  Prescription drug management.               ED Course as of 11/07/24 1344   Thu Nov 07, 2024   1208 There is a note from patient was PCP.  History of rheumatoid arthritis, AFib hypertension hyperlipidemia. [MM]   1213 EKG done at at 11:57 a.m. shows sinus rhythm rate of 90 .  That has ST elevation in the inferior leads.  ST depression in aVL, V2, V3, V4. [MM]   1226 CBC auto differential(!)  Mild leukocytosis.  No anemia [MM]   1342 INR(!): 1.4 [MM]   1342 Troponin I(!): 0.111 [MM]   1343 PTT(!): 34.4 [MM]      ED Course User Index  [MM] Jeremiah Raymond MD                           Clinical Impression:  Final diagnoses:  [R07.9] Chest pain  [I21.3] ST  elevation myocardial infarction (STEMI), unspecified artery (Primary)  [I10] Hypertension, unspecified type  [E78.5] Hyperlipidemia, unspecified hyperlipidemia type          ED Disposition Condition    Admit Stable                Jeremiah Raymond MD  11/07/24 1404

## 2024-11-08 VITALS
HEART RATE: 80 BPM | TEMPERATURE: 98 F | SYSTOLIC BLOOD PRESSURE: 136 MMHG | DIASTOLIC BLOOD PRESSURE: 77 MMHG | OXYGEN SATURATION: 94 % | RESPIRATION RATE: 19 BRPM | BODY MASS INDEX: 27.41 KG/M2 | WEIGHT: 175 LBS

## 2024-11-08 PROBLEM — I21.3 ST ELEVATION MYOCARDIAL INFARCTION (STEMI): Status: ACTIVE | Noted: 2024-11-08

## 2024-11-08 LAB
ALBUMIN SERPL-MCNC: 3.4 G/DL (ref 3.4–4.8)
ALBUMIN/GLOB SERPL: 1.5 RATIO (ref 1.1–2)
ALP SERPL-CCNC: 93 UNIT/L (ref 40–150)
ALT SERPL-CCNC: 22 UNIT/L (ref 0–55)
ANION GAP SERPL CALC-SCNC: 8 MEQ/L
AST SERPL-CCNC: 41 UNIT/L (ref 5–34)
BASOPHILS # BLD AUTO: 0.03 X10(3)/MCL
BASOPHILS NFR BLD AUTO: 0.3 %
BILIRUB SERPL-MCNC: 0.4 MG/DL
BUN SERPL-MCNC: 19.7 MG/DL (ref 8.4–25.7)
CALCIUM SERPL-MCNC: 8.3 MG/DL (ref 8.8–10)
CHLORIDE SERPL-SCNC: 112 MMOL/L (ref 98–107)
CO2 SERPL-SCNC: 23 MMOL/L (ref 23–31)
CREAT SERPL-MCNC: 0.88 MG/DL (ref 0.72–1.25)
CREAT/UREA NIT SERPL: 22
EOSINOPHIL # BLD AUTO: 0.12 X10(3)/MCL (ref 0–0.9)
EOSINOPHIL NFR BLD AUTO: 1.3 %
ERYTHROCYTE [DISTWIDTH] IN BLOOD BY AUTOMATED COUNT: 14.6 % (ref 11.5–17)
GFR SERPLBLD CREATININE-BSD FMLA CKD-EPI: >60 ML/MIN/1.73/M2
GLOBULIN SER-MCNC: 2.3 GM/DL (ref 2.4–3.5)
GLUCOSE SERPL-MCNC: 85 MG/DL (ref 82–115)
HCT VFR BLD AUTO: 38.8 % (ref 42–52)
HGB BLD-MCNC: 13.2 G/DL (ref 14–18)
IMM GRANULOCYTES # BLD AUTO: 0.03 X10(3)/MCL (ref 0–0.04)
IMM GRANULOCYTES NFR BLD AUTO: 0.3 %
LYMPHOCYTES # BLD AUTO: 2.45 X10(3)/MCL (ref 0.6–4.6)
LYMPHOCYTES NFR BLD AUTO: 26.2 %
MAGNESIUM SERPL-MCNC: 1.8 MG/DL (ref 1.6–2.6)
MCH RBC QN AUTO: 31 PG (ref 27–31)
MCHC RBC AUTO-ENTMCNC: 34 G/DL (ref 33–36)
MCV RBC AUTO: 91.1 FL (ref 80–94)
MONOCYTES # BLD AUTO: 0.84 X10(3)/MCL (ref 0.1–1.3)
MONOCYTES NFR BLD AUTO: 9 %
NEUTROPHILS # BLD AUTO: 5.89 X10(3)/MCL (ref 2.1–9.2)
NEUTROPHILS NFR BLD AUTO: 62.9 %
NRBC BLD AUTO-RTO: 0 %
OHS QRS DURATION: 162 MS
OHS QRS DURATION: 170 MS
OHS QRS DURATION: 176 MS
OHS QTC CALCULATION: 445 MS
OHS QTC CALCULATION: 498 MS
OHS QTC CALCULATION: 538 MS
PLATELET # BLD AUTO: 209 X10(3)/MCL (ref 130–400)
PMV BLD AUTO: 9.8 FL (ref 7.4–10.4)
POTASSIUM SERPL-SCNC: 3.7 MMOL/L (ref 3.5–5.1)
PROT SERPL-MCNC: 5.7 GM/DL (ref 5.8–7.6)
RBC # BLD AUTO: 4.26 X10(6)/MCL (ref 4.7–6.1)
SODIUM SERPL-SCNC: 143 MMOL/L (ref 136–145)
TROPONIN I SERPL-MCNC: 4.65 NG/ML (ref 0–0.04)
TROPONIN I SERPL-MCNC: 5.3 NG/ML (ref 0–0.04)
WBC # BLD AUTO: 9.36 X10(3)/MCL (ref 4.5–11.5)

## 2024-11-08 PROCEDURE — 84484 ASSAY OF TROPONIN QUANT: CPT | Performed by: NURSE PRACTITIONER

## 2024-11-08 PROCEDURE — 83735 ASSAY OF MAGNESIUM: CPT

## 2024-11-08 PROCEDURE — 25000003 PHARM REV CODE 250: Performed by: STUDENT IN AN ORGANIZED HEALTH CARE EDUCATION/TRAINING PROGRAM

## 2024-11-08 PROCEDURE — 93010 ELECTROCARDIOGRAM REPORT: CPT | Mod: ,,, | Performed by: INTERNAL MEDICINE

## 2024-11-08 PROCEDURE — 84484 ASSAY OF TROPONIN QUANT: CPT

## 2024-11-08 PROCEDURE — 80053 COMPREHEN METABOLIC PANEL: CPT

## 2024-11-08 PROCEDURE — 25000003 PHARM REV CODE 250

## 2024-11-08 PROCEDURE — 36415 COLL VENOUS BLD VENIPUNCTURE: CPT

## 2024-11-08 PROCEDURE — 85025 COMPLETE CBC W/AUTO DIFF WBC: CPT

## 2024-11-08 PROCEDURE — 63600175 PHARM REV CODE 636 W HCPCS

## 2024-11-08 PROCEDURE — 93005 ELECTROCARDIOGRAM TRACING: CPT

## 2024-11-08 RX ORDER — CLOPIDOGREL BISULFATE 75 MG/1
75 TABLET ORAL DAILY
Qty: 30 TABLET | Refills: 11 | Status: SHIPPED | OUTPATIENT
Start: 2024-11-09 | End: 2025-11-09

## 2024-11-08 RX ORDER — MAGNESIUM SULFATE HEPTAHYDRATE 40 MG/ML
2 INJECTION, SOLUTION INTRAVENOUS ONCE
Status: COMPLETED | OUTPATIENT
Start: 2024-11-08 | End: 2024-11-08

## 2024-11-08 RX ORDER — NITROGLYCERIN 0.4 MG/1
0.4 TABLET SUBLINGUAL EVERY 5 MIN PRN
Qty: 25 TABLET | Refills: 2 | Status: SHIPPED | OUTPATIENT
Start: 2024-11-08 | End: 2025-11-08

## 2024-11-08 RX ORDER — POTASSIUM CHLORIDE 20 MEQ/1
40 TABLET, EXTENDED RELEASE ORAL ONCE
Status: COMPLETED | OUTPATIENT
Start: 2024-11-08 | End: 2024-11-08

## 2024-11-08 RX ADMIN — POTASSIUM CHLORIDE 40 MEQ: 1500 TABLET, EXTENDED RELEASE ORAL at 08:11

## 2024-11-08 RX ADMIN — CLOPIDOGREL BISULFATE 75 MG: 75 TABLET ORAL at 08:11

## 2024-11-08 RX ADMIN — MAGNESIUM SULFATE HEPTAHYDRATE 2 G: 40 INJECTION, SOLUTION INTRAVENOUS at 08:11

## 2024-11-08 RX ADMIN — ASPIRIN 81 MG: 81 TABLET, COATED ORAL at 08:11

## 2024-11-08 NOTE — DISCHARGE SUMMARY
"[facility] - [facility] Medical Telemetry    Cardiology  Discharge Summary      Patient Name: Deshaun Lang Jr.  MRN: [id number]  Admission Date: 11/7/2024  Hospital Length of Stay: 1 days  Discharge Date and Time:  11/08/2024 11:01 AM  Attending Physician: Wilfredo Posada MD  Discharging Provider: JOSE Parikh  Primary Care Physician: Lenard Miranda MD    HPI/Hospital Course: Mr. Lang is an 79 year old male who is known to [facility], Dr. Wei. He presents to the ER with complaints of crushing left sided chest pain that radiates down his left arm that began approximately an hour ago. He reports that he woke up around 8:30 am this morning and had some mild chest discomfort, but it worsened prior to arrival. He reports associated symptoms of SOB but denies palpitations, nausea, vomiting, fever, or chills. An EKG was obtained and demonstrated ST elevation in leads 2, 3, & AVF with concern for inferior wall MI. Code STEMI was activated. He reports that he took his Eliquis this morning. He was loaded w/ ASA in the ER. He will be taken to the cath lab emergently for LHC.     11.8.24: NAD. VSS. No complaints of CP/SOB/Palps. "I feel great. I need to go home today" H&H 13.2/28.8     PMH: DAPHNEY, HLD, Persistent Afib, HTN, RA  PSH: ankle surgery, shoulder surgery, knee surgery  Family History: Denies  Social History: Former tobacco use. Denies alcohol or illicit drug use.      Previous Cardiac Diagnostics:   ECHO (11.7.24):   Left Ventricle: The left ventricle is normal in size. Normal wall thickness. Regional wall motion abnormalities present. Mild hypokinesis of basal inferior septal wall. There is low normal systolic function with a visually estimated ejection fraction of 50 - 55%.  Right Ventricle: Normal right ventricular cavity size. Systolic function is normal.  Mitral Valve: There is mild regurgitation. IVC/SVC: Elevated venous pressure at 15 mmHg.     ETT (6.17.24):  Stress EKG is " non-diagnostic.   Maximal exercise treadmill test (MPHR : 86 %).  The functional capacity is poor (4.6 METs).  The heart rate recovery is normal.   The study quality is good.      MPI (4.3.24):  This is a normal perfusion study, no perfusion defects noted. There is no evidence of ischemia.   This scan is suggestive of low risk for future cardiovascular events.   The left ventricular cavity is noted to be normal on the stress studies. The stress left ventricular ejection fraction was calculated to be 53% and left ventricular global function is normal. The rest left ventricular cavity is noted to be normal. The rest left ventricular ejection fraction was calculated to be 39% and rest left ventricular global function is moderately reduced.   When compared to the resting ejection fraction (39%), the stress ejection fraction (53%) has increased.   The study quality is good.   There was a rise in myocardial blood flow between rest and stress.  Global myocardial blood flow reserve was 2.80.  Normal global coronary flow reserve is suggestive of low coronary event risk.     Carotid US (7.5.23):  The study quality is average.   1-39% stenosis in the proximal right internal carotid artery based on Bluth Criteria.   1-39% stenosis in the proximal left internal carotid artery based on Bluth Criteria.   The right vertebral artery is poorly visualized.   Antegrade left vertebral artery flow.   Antegrade right vertebral artery flow.     Procedure(s) (LRB):  Left heart cath (N/A)   Consults:   Final Active Diagnoses:    Diagnosis Date Noted POA    PRINCIPAL PROBLEM:  ST elevation myocardial infarction (STEMI) [I21.3] 11/08/2024 Yes      Problems Resolved During this Admission:     Discharged Condition: stable  Review of Systems   Cardiovascular:  Negative for chest pain, dyspnea on exertion, leg swelling and palpitations.   Respiratory:  Negative for shortness of breath.    All other systems reviewed and are negative.    Physical  Exam  Vitals and nursing note reviewed.   HENT:      Head: Normocephalic.      Nose: Nose normal.      Mouth/Throat:      Mouth: Mucous membranes are dry.   Eyes:      Extraocular Movements: Extraocular movements intact.   Cardiovascular:      Rate and Rhythm: Normal rate and regular rhythm.      Pulses: Normal pulses.   Abdominal:      Palpations: Abdomen is soft.   Musculoskeletal:         General: Normal range of motion.      Cervical back: Normal range of motion.   Skin:     General: Skin is warm.      Comments: R Wrist Soft/Flat, Non-Tender, No Sign of Bleed/Infection. +2 BLE Palpable Radial Pulses     Neurological:      Mental Status: He is alert and oriented to person, place, and time.   Psychiatric:         Mood and Affect: Mood normal.         Behavior: Behavior normal.       Disposition: Home or Self Care  Follow Up:   Follow-up Information       Magdaleno Wei MD Follow up.    Specialty: Cardiology  Why: 7-10 days  Contact information:  [address] [place] [id number]  [phone]                           Patient Instructions:      Diet Cardiac     Lifting restrictions   Order Comments: Do not lift anything greater than 5 pounds     No driving until:   Order Comments: No driving for 48 Hours     Activity as tolerated     Shower on day dressing removed (No bath)   Order Comments: Showers only for 1 week     Medications:  Reconciled Home Medications:      Medication List        START taking these medications      clopidogreL 75 mg tablet  Commonly known as: PLAVIX  Take 1 tablet (75 mg total) by mouth once daily.  Start taking on: November 9, 2024     nitroGLYCERIN 0.4 MG SL tablet  Commonly known as: NITROSTAT  Place 1 tablet (0.4 mg total) under the tongue every 5 (five) minutes as needed for Chest pain.     omega-3 fatty acids-fish oil 340-1,000 mg Cap  Take 1 capsule by mouth once daily.            CONTINUE taking these medications      amlodipine-benazepril 10-20mg 10-20 mg per capsule  Commonly  known as: LOTREL  TAKE ONE CAPSULE BY MOUTH ONCE DAILY     apixaban 5 mg Tab  Commonly known as: ELIQUIS  Take 1 tablet (5 mg total) by mouth 2 (two) times daily.     calcium citrate-vitamin D3 315-200 mg 315 mg-5 mcg (200 unit) per tablet  Commonly known as: CITRACAL+D  Take 1 tablet by mouth 2 (two) times daily.     colestipoL 1 gram Tab  Commonly known as: COLESTID  TAKE 1 TABLET BY MOUTH TWICE A DAY WITH A FULL GLASS OF WATER     diltiaZEM 240 MG 24 hr capsule  Commonly known as: CARDIZEM CD  Take 240 mg by mouth once daily.     eszopiclone 2 MG Tab  Commonly known as: LUNESTA  TAKE ONE TABLET BY MOUTH EVERY NIGHT AT BEDTIME     finasteride 5 mg tablet  Commonly known as: PROSCAR  Take 1 tablet (5 mg total) by mouth once daily.     hydroxychloroquine 200 mg tablet  Commonly known as: PLAQUENIL  Take 200 mg by mouth 2 (two) times daily.     ibuprofen 800 MG tablet  Commonly known as: ADVIL,MOTRIN  Take 1 tablet (800 mg total) by mouth every 6 (six) hours as needed for Pain (take with food or milk for mild-moderate pain).     predniSONE 5 MG tablet  Commonly known as: DELTASONE  once daily.     tamsulosin 0.4 mg Cap  Commonly known as: FLOMAX  TAKE ONE CAPSULE BY MOUTH TWICE DAILY     traMADoL 50 mg tablet  Commonly known as: ULTRAM  TAKE ONE TABLET EVERY 6 HOURS AS NEEDED FOR PAIN.     vitamin D 1000 units Tab  Commonly known as: VITAMIN D3  Take 1,000 Units by mouth once daily.     zinc gluconate 50 mg tablet  Take 50 mg by mouth once daily.            Impression:  STEMI (Inferior STEMI)  CAD    - [facility] (11.7.24): PCI of RCA; official report pending   Ef 50-55% on ECHO (11.7.24)  Persistent AF - Currently CVR (Had period of SVR)    - CHADsVASc - 3 Points - 3.2% Stroke Risk per Year     - On Eliquis outpatient   Anemia (Mild)  Elevated LFT  VHD    - Mild MR   HTN  HLD  RA  No Hx of GI Bleed     Plan:   Discharge Home  NO ASA to Avoid Triple Therapy  NO BB secondary to AFIB SVR at times; consider starting in  outpatient setting   No Statin Secondary to Allergy; will Start Fish Oil  Start Protonix 40 mg oral Daily for GI Protection  SL Nitro PRN for Chest Pain   Follow-up with Dr. Wei in 7-10 days       IMPORTANT Antiplatelet Medication Instructions:  The patient, Deshaun Lang , was instructed by Cuca Mayers on the importance of Antiplatelet Medication(s) and he verbalizes understanding.   He will continue taking his present antiplatelet Plavix as prescribed.   His new antiplatelet Palvix was sent to the [facility]  pharmacy.  **WARNING:  Never stop **PLAVIX, EFFIENT, or BRILINTA** without first speaking to a [facility] provider** (even if another physician or surgeon insists it must be stopped for a procedure)    Time spent on the discharge of patient: 36 minutes    JOSE Parikh  Cardiology  [facility] - [facility] Tele[facility]

## 2024-11-08 NOTE — PLAN OF CARE
11/08/24 1025   Discharge Assessment   Assessment Type Discharge Planning Assessment   Confirmed/corrected address, phone number and insurance Yes   Confirmed Demographics Correct on Facesheet   Source of Information patient   When was your last doctors appointment? 09/02/24  (Dr Lee)   Does patient/caregiver understand observation status Yes   Communicated JOSH with patient/caregiver Yes   Reason For Admission chest pain,MI   People in Home spouse   Facility Arrived From: home   Do you expect to return to your current living situation? Yes   Do you have help at home or someone to help you manage your care at home? Yes   Who are your caregiver(s) and their phone number(s)? fmly   Prior to hospitilization cognitive status: Alert/Oriented   Current cognitive status: Alert/Oriented   Walking or Climbing Stairs Difficulty yes   Walking or Climbing Stairs ambulation difficulty, requires equipment   Mobility Management has prev used rw when he had knee surgery   Dressing/Bathing Difficulty no   Home Layout Able to live on 1st floor   Equipment Currently Used at Home none   Readmission within 30 days? No   Patient currently being followed by outpatient case management? No   Do you currently have service(s) that help you manage your care at home? No   Do you take prescription medications? Yes   Do you have any problems affording any of your prescribed medications? No   Is the patient taking medications as prescribed? yes   Who is going to help you get home at discharge? fmly   How do you get to doctors appointments? car, drives self   Are you on dialysis? No   Do you take coumadin? No   Discharge Plan A Home   Discharge Plan B Home   DME Needed Upon Discharge  none   Discharge Plan discussed with: Patient;Spouse/sig other   Transition of Care Barriers None   Housing Stability   In the last 12 months, was there a time when you were not able to pay the mortgage or rent on time? N   At any time in the past 12 months,  were you homeless or living in a shelter (including now)? N   Transportation Needs   Has the lack of transportation kept you from medical appointments, meetings, work or from getting things needed for daily living? No   Food Insecurity   Within the past 12 months, you worried that your food would run out before you got the money to buy more. Never true   Within the past 12 months, the food you bought just didn't last and you didn't have money to get more. Never true   Utilities   In the past 12 months has the electric, gas, oil, or water company threatened to shut off services in your home? No     Pt lives with spouse. He is independent , drives, He does not have HH and not currently using rw but has rw, wc from previous knee surgeries. No needs noted at this time.

## 2024-11-08 NOTE — PLAN OF CARE
11/08/24 1131   Final Note   Assessment Type Final Discharge Note   Anticipated Discharge Disposition Home   Post-Acute Status   Discharge Delays None known at this time     Pt will dc to home . No needs noted for cm .

## 2024-11-11 ENCOUNTER — PATIENT OUTREACH (OUTPATIENT)
Dept: ADMINISTRATIVE | Facility: CLINIC | Age: 79
End: 2024-11-11
Payer: MEDICARE

## 2024-11-11 NOTE — PROGRESS NOTES
C3 nurse attempted to contact Deshaun Lang Jr.  for a TCC post hospital discharge follow up call. No answer. Left voicemail with callback information. The patient does not have a scheduled HOSFU appointment. Message sent to PCP staff for assistance with scheduling visit with patient.

## 2024-11-12 ENCOUNTER — TELEPHONE (OUTPATIENT)
Dept: INTERNAL MEDICINE | Facility: CLINIC | Age: 79
End: 2024-11-12
Payer: MEDICARE

## 2024-11-12 NOTE — TELEPHONE ENCOUNTER
----- Message from Lauri Chan sent at 11/8/2024 11:51 AM CST -----    ----- Message -----  From: Yonatan Doherty  Sent: 11/8/2024  11:17 AM CST  To: Mick Weinberg Staff    .Who Called: [facility]    Caller is requesting a Hospital F/U Appointment     Discharge Location:[facility]  Discharge Date: 11/08/24      Preferred Method of Contact: Phone Call  Patient's Preferred Phone Number on File: [phone]   Best Call Back Number, if different:  Additional Information:

## 2024-11-12 NOTE — PROGRESS NOTES
C3 nurse spoke to  Deshaun Lang Jr.  for a TCC post hospital discharge follow up call. The patient does not have a scheduled HOSFU appointment with PCP. Message sent to PCP staff for assistance with scheduling visit with patient. Patient declined scheduling assistance at this time. Patient has scheduled appt with Magdaleno Wei MD (Cardiology) on 11/20/24 @ 9 am

## 2024-11-13 NOTE — PROGRESS NOTES
"Family and/or Caretaker present at visit?  No.  Diagnostic tests reviewed/disposition: No diagnosic tests pending after this hospitalization.  Disease/illness education: given  Home health/community services discussion/referrals: Patient does not have home health established from hospital visit.  They do not need home health.  If needed, we will set up home health for the patient.   Establishment or re-establishment of referral orders for community resources: No other necessary community resources.   Discussion with other health care providers:  Discussed with Dr Wei .  I reviewed and reconciled the medications from hospital discharge.      Internal Medicine    Patient ID: [id number]     Chief Complaint: Hospital Follow Up      HPI:     Deshaun Lang Jr. is a 79 y.o. male here today for a hospital follow up.      Patient Name: Deshaun Lang Jr.  MRN: [id number]  Admission Date: 11/7/2024  Hospital Length of Stay: 1 days  Discharge Date and Time:  11/08/2024 11:01 AM  Attending Physician: Wilfredo Posada MD  Discharging Provider: JOSE Parikh  Primary Care Physician: Lenard Miranda MD     HPI/Hospital Course: Mr. Lang is an 79 year old male who is known to [facility], Dr. eWi. He presents to the ER with complaints of crushing left sided chest pain that radiates down his left arm that began approximately an hour ago. He reports that he woke up around 8:30 am this morning and had some mild chest discomfort, but it worsened prior to arrival. He reports associated symptoms of SOB but denies palpitations, nausea, vomiting, fever, or chills. An EKG was obtained and demonstrated ST elevation in leads 2, 3, & AVF with concern for inferior wall MI. Code STEMI was activated. He reports that he took his Eliquis this morning. He was loaded w/ ASA in the ER. He will be taken to the cath lab emergently for LHC.      11.8.24: NAD. VSS. No complaints of CP/SOB/Palps. "I feel great. I need to go " "home today" H&H 13.2/28.8    Todays information  He tells me that he has not taken his Plavix since his discharge because he likes to take ibuprofen daily.  I advised him that his Plavix is imperative  Case discussed with his primary cardiologist Dr. Wei who advises patient to take 300 mg of Plavix today and 75 mg daily thereafter.  He had muscle pain on high intesnsity statins, he does not have an allergy.  Will start pravachol 10mg today and set up Leqvio ASAP; we will uptitrate pravachol next week if he tolerates.     Past Medical History:   Diagnosis Date    Carpal tunnel syndrome     Cataracts, bilateral     HLD (hyperlipidemia)     HTN (hypertension)     Paroxysmal atrial fibrillation     Rheumatoid arthritis, unspecified     Unspecified osteoarthritis, unspecified site         Past Surgical History:   Procedure Laterality Date    APPENDECTOMY      CARPAL TUNNEL RELEASE      CATARACT EXTRACTION      COLON SURGERY      EYE SURGERY      FORAMINOTOMY Left 2023    C3-4 anterior foraminotomy.  Dr. Chapman    JOINT REPLACEMENT  Both knees      LEFT HEART CATHETERIZATION N/A 2024    Procedure: Left heart cath;  Surgeon: Asa Glasgow Jr., MD;  Location: [facility] CATH LAB;  Service: Cardiology;  Laterality: N/A;    NEUROPLASTY  2012    TONSILLECTOMY  As a child    TOTAL KNEE ARTHROPLASTY  2017    TRANSURETHRAL RESECTION OF PROSTATE  2017        Social History     Tobacco Use    Smoking status: Former     Types: Cigars     Start date: 6/15/1962     Quit date: 6/15/1972     Years since quittin.4    Smokeless tobacco: Never   Substance and Sexual Activity    Alcohol use: Not Currently    Drug use: Never    Sexual activity: Not Currently        Current Outpatient Medications   Medication Instructions    amlodipine-benazepril 10-20mg (LOTREL) 10-20 mg per capsule 1 capsule, Oral    apixaban (ELIQUIS) 5 mg, Oral, 2 times daily    calcium citrate-vitamin D3 315-200 mg " "(CITRACAL+D) 315 mg-5 mcg (200 unit) per tablet 1 tablet, 2 times daily    clopidogreL (PLAVIX) 75 mg, Oral, Daily    colestipoL (COLESTID) 1 gram Tab TAKE 1 TABLET BY MOUTH TWICE A DAY WITH A FULL GLASS OF WATER    diltiaZEM (CARDIZEM CD) 240 mg, Daily    eszopiclone (LUNESTA) 2 mg, Oral, Nightly, at bedtime.    finasteride (PROSCAR) 5 mg, Oral, Daily    hydroxychloroquine (PLAQUENIL) 200 mg, 2 times daily    nitroGLYCERIN (NITROSTAT) 0.4 mg, Sublingual, Every 5 min PRN    pravastatin (PRAVACHOL) 10 mg, Oral, Daily    predniSONE (DELTASONE) 5 MG tablet Daily    tamsulosin (FLOMAX) 0.4 mg Cap TAKE ONE CAPSULE BY MOUTH TWICE DAILY    traMADoL (ULTRAM) 50 mg tablet TAKE ONE TABLET EVERY 6 HOURS AS NEEDED FOR PAIN.    vitamin D (VITAMIN D3) 1,000 Units, Daily    zinc gluconate 50 mg, Daily       Review of patient's allergies indicates:   Allergen Reactions    Penicillins Other (See Comments)     Unknown what type of reaction      Sulfasalazine Other (See Comments)        Patient Care Team:  Lenard Miranda MD as PCP - General (Internal Medicine)  Gerardo Obando MD as Consulting Physician (Urology)  Gely Herman MD as Consulting Physician (Rheumatology)  Magdaleno Wei MD as Consulting Physician (Cardiology)     Subjective:     Review of Systems    12 point review of systems conducted, negative except as stated in the history of present illness. See HPI for details.    Objective:     Visit Vitals  /66 (BP Location: Left arm, Patient Position: Sitting)   Pulse 64   Temp 97.6 °F (36.4 °C) (Temporal)   Resp 16   Ht 5' 7" (1.702 m)   Wt 79.8 kg (176 lb)   SpO2 99%   BMI 27.57 kg/m²       Physical Exam  Constitutional:       Appearance: Normal appearance.   HENT:      Head: Normocephalic and atraumatic.   Eyes:      Extraocular Movements: Extraocular movements intact.      Pupils: Pupils are equal, round, and reactive to light.   Cardiovascular:      Rate and Rhythm: Normal rate and regular rhythm. "   Pulmonary:      Effort: Pulmonary effort is normal.      Breath sounds: Normal breath sounds.   Abdominal:      General: Bowel sounds are normal.      Palpations: Abdomen is soft.   Musculoskeletal:         General: Normal range of motion.   Skin:     General: Skin is warm and dry.   Neurological:      General: No focal deficit present.      Mental Status: He is alert.   Psychiatric:         Mood and Affect: Mood normal.         Labs Reviewed:     Chemistry:  Lab Results   Component Value Date     11/08/2024    K 3.7 11/08/2024    BUN 19.7 11/08/2024    CREATININE 0.88 11/08/2024    EGFRNORACEVR >60 11/08/2024    GLUCOSE 85 11/08/2024    CALCIUM 8.3 (L) 11/08/2024    ALKPHOS 93 11/08/2024    LABPROT 5.7 (L) 11/08/2024    ALBUMIN 3.4 11/08/2024    BILIDIR 0.3 02/08/2022    IBILI 0.30 02/08/2022    AST 41 (H) 11/08/2024    ALT 22 11/08/2024    MG 1.80 11/08/2024    LZDRNBTD79LG 52.7 08/10/2023    TSH 2.066 12/27/2023    PSA 0.61 08/10/2023        Lab Results   Component Value Date    HGBA1C 5.8 11/07/2024        Hematology:  Lab Results   Component Value Date    WBC 9.36 11/08/2024    HGB 13.2 (L) 11/08/2024    HCT 38.8 (L) 11/08/2024     11/08/2024       Lipid Panel:  Lab Results   Component Value Date    CHOL 193 11/07/2024    HDL 51 11/07/2024    .00 11/07/2024    TRIG 66 11/07/2024    TOTALCHOLEST 4 11/07/2024        Urine:  Lab Results   Component Value Date    APPEARANCEUA Clear 08/10/2023    SGUA 1.015 08/10/2023    PROTEINUA Negative 08/10/2023    KETONESUA Negative 08/10/2023    LEUKOCYTESUR Negative 08/10/2023    RBCUA <5 08/10/2023    WBCUA <5 08/10/2023    BACTERIA None Seen 08/10/2023    HYALINECASTS Moderate 02/08/2022        Assessment:       ICD-10-CM ICD-9-CM   1. Hospital discharge follow-up  Z09 V67.59   2. ASCVD (arteriosclerotic cardiovascular disease)  I25.10 429.2     440.9   3. Myocardial infarction, unspecified MI type, unspecified artery  I21.9 410.90        Plan:     1.  Hospital discharge follow-up  Assessment & Plan:  He tells me that he has not taken his Plavix since his discharge because he likes to take ibuprofen daily.  I advised him that his Plavix is imperative  Case discussed with his primary cardiologist Dr. Wei who advises patient to take 300 mg of Plavix today and 75 mg daily thereafter.  He had muscle pain on high intesnsity statins, he does not have an allergy.  Will start pravachol 10mg today and set up Leqvio ASAP; we will uptitrate pravachol next week if he tolerates.         2. ASCVD (arteriosclerotic cardiovascular disease)    3. Myocardial infarction, unspecified MI type, unspecified artery    Other orders  -     pravastatin (PRAVACHOL) 10 MG tablet; Take 1 tablet (10 mg total) by mouth once daily.  Dispense: 90 tablet; Refill: 3  -     inclisiran 284 mg/1.5 mL subcutaneous injection 284 mg  -     inclisiran 284 mg/1.5 mL subcutaneous injection 284 mg  -     acetaminophen tablet 650 mg  -     diphenhydrAMINE (BENADRYL) 50 mg in 0.9% NaCl 50 mL IVPB  -     methylPREDNISolone sodium succinate injection 125 mg  -     ondansetron injection 8 mg  -     ketorolac injection 15 mg  -     EPINEPHrine (EPIPEN) 0.3 mg/0.3 mL pen injection 0.3 mg  -     diphenhydrAMINE injection 50 mg  -     methylPREDNISolone sodium succinate injection 125 mg         Follow up in about 4 weeks (around 12/13/2024) for with labs prior to visit- [facility]. In addition to their scheduled follow up, the patient has also been instructed to follow up on as needed basis.     No future appointments.       Lenard Miranda MD

## 2024-11-15 ENCOUNTER — OFFICE VISIT (OUTPATIENT)
Dept: INTERNAL MEDICINE | Facility: CLINIC | Age: 79
End: 2024-11-15
Payer: MEDICARE

## 2024-11-15 VITALS
HEART RATE: 64 BPM | DIASTOLIC BLOOD PRESSURE: 66 MMHG | WEIGHT: 176 LBS | HEIGHT: 67 IN | OXYGEN SATURATION: 99 % | TEMPERATURE: 98 F | RESPIRATION RATE: 16 BRPM | BODY MASS INDEX: 27.62 KG/M2 | SYSTOLIC BLOOD PRESSURE: 120 MMHG

## 2024-11-15 DIAGNOSIS — I25.10 ASCVD (ARTERIOSCLEROTIC CARDIOVASCULAR DISEASE): ICD-10-CM

## 2024-11-15 DIAGNOSIS — Z09 HOSPITAL DISCHARGE FOLLOW-UP: Primary | ICD-10-CM

## 2024-11-15 DIAGNOSIS — Z23 NEED FOR VACCINATION: ICD-10-CM

## 2024-11-15 DIAGNOSIS — I21.9 MYOCARDIAL INFARCTION, UNSPECIFIED MI TYPE, UNSPECIFIED ARTERY: ICD-10-CM

## 2024-11-15 PROBLEM — Z00.00 MEDICARE ANNUAL WELLNESS VISIT, SUBSEQUENT: Status: RESOLVED | Noted: 2024-08-20 | Resolved: 2024-11-15

## 2024-11-15 RX ORDER — ACETAMINOPHEN 325 MG/1
650 TABLET ORAL EVERY 6 HOURS PRN
OUTPATIENT
Start: 2024-12-01

## 2024-11-15 RX ORDER — KETOROLAC TROMETHAMINE 30 MG/ML
15 INJECTION, SOLUTION INTRAMUSCULAR; INTRAVENOUS ONCE AS NEEDED
OUTPATIENT
Start: 2024-12-01

## 2024-11-15 RX ORDER — DIPHENHYDRAMINE HYDROCHLORIDE 50 MG/ML
50 INJECTION INTRAMUSCULAR; INTRAVENOUS ONCE AS NEEDED
OUTPATIENT
Start: 2024-12-01

## 2024-11-15 RX ORDER — EPINEPHRINE 0.3 MG/.3ML
0.3 INJECTION SUBCUTANEOUS ONCE AS NEEDED
OUTPATIENT
Start: 2024-12-01

## 2024-11-15 RX ORDER — ONDANSETRON HYDROCHLORIDE 2 MG/ML
8 INJECTION, SOLUTION INTRAVENOUS ONCE AS NEEDED
OUTPATIENT
Start: 2024-12-01

## 2024-11-15 RX ORDER — PRAVASTATIN SODIUM 10 MG/1
10 TABLET ORAL DAILY
Qty: 90 TABLET | Refills: 3 | Status: SHIPPED | OUTPATIENT
Start: 2024-11-15 | End: 2025-11-15

## 2024-11-15 RX ORDER — METHYLPREDNISOLONE SOD SUCC 125 MG
125 VIAL (EA) INJECTION ONCE
OUTPATIENT
Start: 2024-12-01 | End: 2024-12-01

## 2024-11-15 RX ORDER — METHYLPREDNISOLONE SOD SUCC 125 MG
125 VIAL (EA) INJECTION
OUTPATIENT
Start: 2024-12-01

## 2024-11-15 NOTE — ASSESSMENT & PLAN NOTE
He tells me that he has not taken his Plavix since his discharge because he likes to take ibuprofen daily.  I advised him that his Plavix is imperative  Case discussed with his primary cardiologist Dr. Wei who advises patient to take 300 mg of Plavix today and 75 mg daily thereafter.  He had muscle pain on high intesnsity statins, he does not have an allergy.  Will start pravachol 10mg today and set up Leqvio ASAP; we will uptitrate pravachol next week if he tolerates.

## 2024-11-22 DIAGNOSIS — I10 PRIMARY HYPERTENSION: ICD-10-CM

## 2024-11-22 DIAGNOSIS — E78.2 MIXED HYPERLIPIDEMIA: ICD-10-CM

## 2024-11-22 DIAGNOSIS — M19.90 OSTEOARTHRITIS, UNSPECIFIED OSTEOARTHRITIS TYPE, UNSPECIFIED SITE: ICD-10-CM

## 2024-11-22 RX ORDER — BENAZEPRIL HYDROCHLORIDE 20 MG/1
20 TABLET ORAL DAILY
Qty: 90 TABLET | Refills: 3 | Status: SHIPPED | OUTPATIENT
Start: 2024-11-22 | End: 2025-11-22

## 2024-11-22 RX ORDER — TRAMADOL HYDROCHLORIDE 50 MG/1
TABLET ORAL
Qty: 60 TABLET | Refills: 0 | Status: SHIPPED | OUTPATIENT
Start: 2024-11-22

## 2024-11-22 RX ORDER — AMLODIPINE AND BENAZEPRIL HYDROCHLORIDE 10; 20 MG/1; MG/1
1 CAPSULE ORAL
Qty: 30 CAPSULE | Refills: 3 | OUTPATIENT
Start: 2024-11-22

## 2024-12-04 ENCOUNTER — INFUSION (OUTPATIENT)
Dept: INFUSION THERAPY | Facility: HOSPITAL | Age: 79
End: 2024-12-04
Attending: INTERNAL MEDICINE
Payer: MEDICARE

## 2024-12-04 VITALS
SYSTOLIC BLOOD PRESSURE: 126 MMHG | OXYGEN SATURATION: 98 % | HEART RATE: 78 BPM | DIASTOLIC BLOOD PRESSURE: 63 MMHG | RESPIRATION RATE: 18 BRPM

## 2024-12-04 DIAGNOSIS — I25.10 ASCVD (ARTERIOSCLEROTIC CARDIOVASCULAR DISEASE): Primary | ICD-10-CM

## 2024-12-04 PROCEDURE — 63600175 PHARM REV CODE 636 W HCPCS: Mod: JZ,TB | Performed by: INTERNAL MEDICINE

## 2024-12-04 PROCEDURE — 96372 THER/PROPH/DIAG INJ SC/IM: CPT

## 2024-12-04 RX ORDER — KETOROLAC TROMETHAMINE 30 MG/ML
15 INJECTION, SOLUTION INTRAMUSCULAR; INTRAVENOUS ONCE AS NEEDED
OUTPATIENT
Start: 2025-01-01

## 2024-12-04 RX ORDER — DIPHENHYDRAMINE HYDROCHLORIDE 50 MG/ML
50 INJECTION INTRAMUSCULAR; INTRAVENOUS ONCE AS NEEDED
OUTPATIENT
Start: 2025-01-01

## 2024-12-04 RX ORDER — KETOROLAC TROMETHAMINE 30 MG/ML
15 INJECTION, SOLUTION INTRAMUSCULAR; INTRAVENOUS ONCE AS NEEDED
Status: DISCONTINUED | OUTPATIENT
Start: 2024-12-04 | End: 2024-12-04 | Stop reason: HOSPADM

## 2024-12-04 RX ORDER — ONDANSETRON HYDROCHLORIDE 2 MG/ML
8 INJECTION, SOLUTION INTRAVENOUS ONCE AS NEEDED
Status: DISCONTINUED | OUTPATIENT
Start: 2024-12-04 | End: 2024-12-04 | Stop reason: HOSPADM

## 2024-12-04 RX ORDER — METHYLPREDNISOLONE SOD SUCC 125 MG
125 VIAL (EA) INJECTION
Status: DISCONTINUED | OUTPATIENT
Start: 2024-12-04 | End: 2024-12-04 | Stop reason: HOSPADM

## 2024-12-04 RX ORDER — EPINEPHRINE 0.3 MG/.3ML
0.3 INJECTION SUBCUTANEOUS ONCE AS NEEDED
OUTPATIENT
Start: 2025-01-01

## 2024-12-04 RX ORDER — DIPHENHYDRAMINE HYDROCHLORIDE 50 MG/ML
50 INJECTION INTRAMUSCULAR; INTRAVENOUS ONCE AS NEEDED
Status: DISCONTINUED | OUTPATIENT
Start: 2024-12-04 | End: 2024-12-04 | Stop reason: HOSPADM

## 2024-12-04 RX ORDER — METHYLPREDNISOLONE SOD SUCC 125 MG
125 VIAL (EA) INJECTION ONCE
Status: DISCONTINUED | OUTPATIENT
Start: 2024-12-04 | End: 2024-12-04 | Stop reason: HOSPADM

## 2024-12-04 RX ORDER — METHYLPREDNISOLONE SOD SUCC 125 MG
125 VIAL (EA) INJECTION ONCE
OUTPATIENT
Start: 2025-01-01 | End: 2025-01-01

## 2024-12-04 RX ORDER — METHYLPREDNISOLONE SOD SUCC 125 MG
125 VIAL (EA) INJECTION
OUTPATIENT
Start: 2025-01-01

## 2024-12-04 RX ORDER — ACETAMINOPHEN 325 MG/1
650 TABLET ORAL EVERY 6 HOURS PRN
OUTPATIENT
Start: 2025-01-01

## 2024-12-04 RX ORDER — ACETAMINOPHEN 325 MG/1
650 TABLET ORAL EVERY 6 HOURS PRN
Status: DISCONTINUED | OUTPATIENT
Start: 2024-12-04 | End: 2024-12-04 | Stop reason: HOSPADM

## 2024-12-04 RX ORDER — EPINEPHRINE 0.3 MG/.3ML
0.3 INJECTION SUBCUTANEOUS ONCE AS NEEDED
Status: DISCONTINUED | OUTPATIENT
Start: 2024-12-04 | End: 2024-12-04 | Stop reason: HOSPADM

## 2024-12-04 RX ORDER — ONDANSETRON HYDROCHLORIDE 2 MG/ML
8 INJECTION, SOLUTION INTRAVENOUS ONCE AS NEEDED
OUTPATIENT
Start: 2025-01-01

## 2024-12-04 RX ADMIN — INCLISIRAN 284 MG: 284 INJECTION, SOLUTION SUBCUTANEOUS at 02:12

## 2024-12-04 NOTE — NURSING
"Patient received Leqvio, tolerated well. No adverse reaction noted. Patient left 5 minutes post injection stating " I feel fine and can't wait any longer, I have some where to be." Instructed to call provider with any issues or concerns. Voiced understanding.   "

## 2024-12-05 DIAGNOSIS — G47.09 OTHER INSOMNIA: ICD-10-CM

## 2024-12-05 RX ORDER — ESZOPICLONE 2 MG/1
2 TABLET, FILM COATED ORAL NIGHTLY
Qty: 30 TABLET | Refills: 5 | Status: SHIPPED | OUTPATIENT
Start: 2024-12-05

## 2024-12-06 ENCOUNTER — TELEPHONE (OUTPATIENT)
Dept: INTERNAL MEDICINE | Facility: CLINIC | Age: 79
End: 2024-12-06
Payer: MEDICARE

## 2024-12-06 NOTE — TELEPHONE ENCOUNTER
Pt called stating that he can not take ibuprofen for is RA and you told him to try the extra strength tylenol but it is not working. He is having trouble using his hands and wondering if he could up his tramadol or if you could prescribe something else he can take? Please advise    Attending Only

## 2024-12-06 NOTE — TELEPHONE ENCOUNTER
----- Message from aJck sent at 12/6/2024 10:15 AM CST -----  Who Called: Deshaun Lang Jr.    Caller is requesting assistance/information from provider's office.    Symptoms (please be specific):    How long has patient had these symptoms:    List of preferred pharmacies on file (remove unneeded): [unfilled]  If different, enter pharmacy into here including location and phone number:       Preferred Method of Contact: Phone Call  Patient's Preferred Phone Number on File: [phone]   Best Call Back Number, if different:  Additional Information: Pt is requesting a cb in regards to his medication list.

## 2024-12-10 ENCOUNTER — TELEPHONE (OUTPATIENT)
Dept: INTERNAL MEDICINE | Facility: CLINIC | Age: 79
End: 2024-12-10
Payer: MEDICARE

## 2024-12-10 NOTE — TELEPHONE ENCOUNTER
----- Message from Lenard Miranda MD sent at 12/10/2024  1:08 PM CST -----  Yes for now until we get that LDL a little bit lower  ----- Message -----  From: Dustin Krueger MA  Sent: 12/10/2024   1:01 PM CST  To: Lenard Miranda MD    Does pt need to take pravastatin 10 mg while on the Leqvio?  ----- Message -----  From: Jack Villanueva  Sent: 12/10/2024   9:38 AM CST  To: Mick Weinberg Staff    Who Called: Deshaun Lang Jr.    Caller is requesting assistance/information from provider's office.    Symptoms (please be specific):    How long has patient had these symptoms:    List of preferred pharmacies on file (remove unneeded): [unfilled]  If different, enter pharmacy into here including location and phone number:       Preferred Method of Contact: Phone Call  Patient's Preferred Phone Number on File: [phone]   Best Call Back Number, if different:  Additional Information: Pt is requesting a cb in regards to having trouble using hands and would like to know if he has to continue taking cholesterol medication now that he's taking injections.

## 2024-12-12 DIAGNOSIS — M19.90 OSTEOARTHRITIS, UNSPECIFIED OSTEOARTHRITIS TYPE, UNSPECIFIED SITE: ICD-10-CM

## 2024-12-12 RX ORDER — TRAMADOL HYDROCHLORIDE 50 MG/1
50 TABLET ORAL 3 TIMES DAILY PRN
Qty: 90 TABLET | Refills: 0 | Status: SHIPPED | OUTPATIENT
Start: 2024-12-12

## 2024-12-12 NOTE — TELEPHONE ENCOUNTER
----- Message from Radha sent at 12/12/2024  3:27 PM CST -----  Regarding: advice  Who Called: Stevenson    Caller is requesting assistance/information from provider's office.    Symptoms (please be specific):    How long has patient had these symptoms:    List of preferred pharmacies on file (remove unneeded): [unfilled]  If different, enter pharmacy into here including location and phone number:       Preferred Method of Contact: Phone Call  Patient's Preferred Phone Number on File: [phone]   Best Call Back Number, if different:    Additional Information: stated that the rx for traMADoL (ULTRAM) 50 mg tabletwas unable to be filled by [facility]. Stated that she is needing to have meds sent to [facility]. Please advise

## 2024-12-23 DIAGNOSIS — I10 PRIMARY HYPERTENSION: ICD-10-CM

## 2024-12-23 DIAGNOSIS — M19.90 OSTEOARTHRITIS, UNSPECIFIED OSTEOARTHRITIS TYPE, UNSPECIFIED SITE: ICD-10-CM

## 2024-12-23 DIAGNOSIS — E78.2 MIXED HYPERLIPIDEMIA: ICD-10-CM

## 2024-12-23 RX ORDER — AMLODIPINE AND BENAZEPRIL HYDROCHLORIDE 10; 20 MG/1; MG/1
1 CAPSULE ORAL
Qty: 30 CAPSULE | Refills: 3 | OUTPATIENT
Start: 2024-12-23

## 2024-12-23 NOTE — TELEPHONE ENCOUNTER
Spoke to pt's wife. Pt's wife verbally confirmed understanding. She also noted that the Tramadol 50 mg TID was unable to fill at [facility]. I called [facility] and they stated that they are no longer able to fill monthly pain medications, they can only fill short 7 day periods for controlled substances. I called pt's wife back and she would like RX sent to [facility].

## 2024-12-23 NOTE — TELEPHONE ENCOUNTER
Spoke to pt's wife who stated they were never told the amlodipine-benazepril was DC. Pt has been taking the medication and would like refills.

## 2024-12-23 NOTE — TELEPHONE ENCOUNTER
Lenard Miranda MD [place], [place], [place]  Caller: Unspecified (Today,  4:22 PM)  They 100% told it was discontinued please see my last progress note.  Cardizem AKA diltiazem also is a calcium channel blocker he should not be taking that and Norvasc

## 2024-12-24 RX ORDER — TRAMADOL HYDROCHLORIDE 50 MG/1
50 TABLET ORAL 3 TIMES DAILY PRN
Qty: 90 TABLET | Refills: 0 | Status: SHIPPED | OUTPATIENT
Start: 2024-12-24 | End: 2024-12-26 | Stop reason: SDUPTHER

## 2024-12-26 DIAGNOSIS — M19.90 OSTEOARTHRITIS, UNSPECIFIED OSTEOARTHRITIS TYPE, UNSPECIFIED SITE: ICD-10-CM

## 2024-12-26 RX ORDER — TRAMADOL HYDROCHLORIDE 50 MG/1
50 TABLET ORAL 3 TIMES DAILY PRN
Qty: 90 TABLET | Refills: 0 | Status: SHIPPED | OUTPATIENT
Start: 2024-12-26

## 2024-12-26 NOTE — TELEPHONE ENCOUNTER
Spoke to pt's wife who stated that they were unable to fill RX for Tramadol at [facility] because they need RX to come from the doctor's office. She also noted that pt will run out of some prescriptions in January while they are in [place] for wife's sx, so they need rx's sent to [facility] for early refills. Pt's wife did not have list with her, but noted that [facility] does have the list. I called [facility] who stated that all refills will go through tomorrow with insurance, but the Lunesta needs to be okayed for early refill. Okay to send Tramadol to [facility] and give okay for Lunesta early fill with [facility]?

## 2024-12-26 NOTE — TELEPHONE ENCOUNTER
RX sent to pharmacy on file. Spoke to Delroy from [facility] and they are going to fill Lunesta early.

## 2024-12-26 NOTE — TELEPHONE ENCOUNTER
----- Message from Jack sent at 12/26/2024  9:51 AM CST -----  Who Called: Karolyn Lang (spouse)    Caller is requesting assistance/information from provider's office.    Symptoms (please be specific):    How long has patient had these symptoms:    List of preferred pharmacies on file (remove unneeded): [unfilled]  If different, enter pharmacy into here including location and phone number:       Preferred Method of Contact: Phone Call  Patient's Preferred Phone Number on File: [phone]  Best Call Back Number, if different:  Additional Information: Karolyn is requesting a cb with questions regarding pt's medication.

## 2025-01-14 ENCOUNTER — TELEPHONE (OUTPATIENT)
Dept: INTERNAL MEDICINE | Facility: CLINIC | Age: 80
End: 2025-01-14
Payer: MEDICARE

## 2025-01-14 DIAGNOSIS — I10 HYPERTENSION, UNSPECIFIED TYPE: ICD-10-CM

## 2025-01-14 DIAGNOSIS — E78.5 HYPERLIPIDEMIA, UNSPECIFIED HYPERLIPIDEMIA TYPE: ICD-10-CM

## 2025-01-14 NOTE — TELEPHONE ENCOUNTER
----- Message from Darian sent at 1/14/2025  8:46 AM CST -----  Who Called: Deshaun Lang Jr.    Patient is returning phone call    Who Left Message for Patient:  Does the patient know what this is regarding?:      Preferred Method of Contact: Phone Call  Patient's Preferred Phone Number on File: [phone]   Best Call Back Number, if different:  Additional Information: pt wife misbah called to speak with nurse current pharmacy will not fill Eliquis and wants it sent to  [facility] [address] pls advise

## 2025-01-25 DIAGNOSIS — M19.90 OSTEOARTHRITIS, UNSPECIFIED OSTEOARTHRITIS TYPE, UNSPECIFIED SITE: ICD-10-CM

## 2025-01-25 DIAGNOSIS — E78.5 HYPERLIPIDEMIA, UNSPECIFIED HYPERLIPIDEMIA TYPE: ICD-10-CM

## 2025-01-25 DIAGNOSIS — I10 HYPERTENSION, UNSPECIFIED TYPE: ICD-10-CM

## 2025-01-27 ENCOUNTER — TELEPHONE (OUTPATIENT)
Dept: INTERNAL MEDICINE | Facility: CLINIC | Age: 80
End: 2025-01-27
Payer: MEDICARE

## 2025-01-27 RX ORDER — TRAMADOL HYDROCHLORIDE 50 MG/1
50 TABLET ORAL EVERY 8 HOURS PRN
Qty: 90 TABLET | Refills: 0 | Status: SHIPPED | OUTPATIENT
Start: 2025-01-27

## 2025-01-27 RX ORDER — APIXABAN 5 MG/1
5 TABLET, FILM COATED ORAL 2 TIMES DAILY
Qty: 180 TABLET | Refills: 0 | OUTPATIENT
Start: 2025-01-27

## 2025-01-27 NOTE — TELEPHONE ENCOUNTER
----- Message from Javy sent at 1/27/2025  4:02 PM CST -----  .Who Called: Deshaun Lang Jr.    Caller is requesting assistance/information from provider's office.    Symptoms (please be specific): n/a   How long has patient had these symptoms:  n/a  List of preferred pharmacies on file (remove unneeded): [unfilled]  If different, enter pharmacy into here including location and phone number: n/a      Preferred Method of Contact: Phone Call    Patient's Preferred Phone Number on File: [phone]     Best Call Back Number, if different:    Additional Information: Requesting a cb from nurse. Pt stated he has questions on what medications to take. Pt did not discuss further. Please advise, thank you.

## 2025-02-24 DIAGNOSIS — M19.90 OSTEOARTHRITIS, UNSPECIFIED OSTEOARTHRITIS TYPE, UNSPECIFIED SITE: ICD-10-CM

## 2025-02-24 RX ORDER — TRAMADOL HYDROCHLORIDE 50 MG/1
50 TABLET ORAL EVERY 8 HOURS PRN
Qty: 90 TABLET | Refills: 0 | Status: SHIPPED | OUTPATIENT
Start: 2025-02-24

## 2025-03-03 ENCOUNTER — TELEPHONE (OUTPATIENT)
Dept: INTERNAL MEDICINE | Facility: CLINIC | Age: 80
End: 2025-03-03
Payer: MEDICARE

## 2025-03-03 NOTE — TELEPHONE ENCOUNTER
Spoke to pt who stated that he has a cyst underneath his eye. Pt wanted t see if he could get it lanced. I let [facility] to [facility] because they are equipped to tequila if needed, or can at the very least exam the bump and treat soon being that clinic is done for today 3/3/25 and it is closed for 3/4/25. Pt confirmed understanding and will go to [facility].

## 2025-03-03 NOTE — TELEPHONE ENCOUNTER
----- Message from Radha sent at 3/3/2025  3:32 PM CST -----  Who Called: Deshaun Lang Jr.Caller is requesting assistance/information from provider's office.Symptoms (please be specific):  How long has patient had these symptoms:  List of preferred pharmacies on file (remove unneeded): [unfilled]If different, enter pharmacy into here including location and phone number: Preferred Method of Contact: Phone CallPatient's Preferred Phone Number on File: [phone] Best Call Back Number, if different:Additional Information: stated that he has a cyst like bump under left eye. Stated that it is giving him a great deal of pain and is requesting a call back from the nurse to discuss. Please advise

## 2025-03-05 ENCOUNTER — APPOINTMENT (OUTPATIENT)
Dept: LAB | Facility: HOSPITAL | Age: 80
End: 2025-03-05
Attending: DERMATOLOGY
Payer: MEDICARE

## 2025-03-05 DIAGNOSIS — L01.01 NON-BULLOUS IMPETIGO: ICD-10-CM

## 2025-03-05 DIAGNOSIS — Z79.899 NEED FOR PROPHYLACTIC CHEMOTHERAPY: ICD-10-CM

## 2025-03-05 DIAGNOSIS — L72.0 EPITHELIAL INCLUSION CYST: Primary | ICD-10-CM

## 2025-03-05 LAB
BASOPHILS # BLD AUTO: 0.02 X10(3)/MCL
BASOPHILS NFR BLD AUTO: 0.2 %
EOSINOPHIL # BLD AUTO: 0.01 X10(3)/MCL (ref 0–0.9)
EOSINOPHIL NFR BLD AUTO: 0.1 %
ERYTHROCYTE [DISTWIDTH] IN BLOOD BY AUTOMATED COUNT: 13.8 % (ref 11.5–17)
HCT VFR BLD AUTO: 39.7 % (ref 42–52)
HGB BLD-MCNC: 13.2 G/DL (ref 14–18)
IMM GRANULOCYTES # BLD AUTO: 0.05 X10(3)/MCL (ref 0–0.04)
IMM GRANULOCYTES NFR BLD AUTO: 0.4 %
LYMPHOCYTES # BLD AUTO: 1.42 X10(3)/MCL (ref 0.6–4.6)
LYMPHOCYTES NFR BLD AUTO: 11 %
MCH RBC QN AUTO: 31.4 PG (ref 27–31)
MCHC RBC AUTO-ENTMCNC: 33.2 G/DL (ref 33–36)
MCV RBC AUTO: 94.5 FL (ref 80–94)
MONOCYTES # BLD AUTO: 0.95 X10(3)/MCL (ref 0.1–1.3)
MONOCYTES NFR BLD AUTO: 7.4 %
NEUTROPHILS # BLD AUTO: 10.45 X10(3)/MCL (ref 2.1–9.2)
NEUTROPHILS NFR BLD AUTO: 80.9 %
NRBC BLD AUTO-RTO: 0 %
PLATELET # BLD AUTO: 217 X10(3)/MCL (ref 130–400)
PMV BLD AUTO: 9.6 FL (ref 7.4–10.4)
RBC # BLD AUTO: 4.2 X10(6)/MCL (ref 4.7–6.1)
WBC # BLD AUTO: 12.9 X10(3)/MCL (ref 4.5–11.5)

## 2025-03-05 PROCEDURE — 36415 COLL VENOUS BLD VENIPUNCTURE: CPT

## 2025-03-05 PROCEDURE — 85025 COMPLETE CBC W/AUTO DIFF WBC: CPT

## 2025-03-06 ENCOUNTER — TELEPHONE (OUTPATIENT)
Dept: INFUSION THERAPY | Facility: HOSPITAL | Age: 80
End: 2025-03-06
Payer: MEDICARE

## 2025-03-07 ENCOUNTER — LAB REQUISITION (OUTPATIENT)
Dept: LAB | Facility: HOSPITAL | Age: 80
End: 2025-03-07
Payer: MEDICARE

## 2025-03-07 DIAGNOSIS — L08.0 PYODERMA: ICD-10-CM

## 2025-03-07 DIAGNOSIS — L72.0 EPIDERMAL CYST: ICD-10-CM

## 2025-03-07 PROCEDURE — 87077 CULTURE AEROBIC IDENTIFY: CPT | Performed by: DERMATOLOGY

## 2025-03-10 ENCOUNTER — INFUSION (OUTPATIENT)
Dept: INFUSION THERAPY | Facility: HOSPITAL | Age: 80
End: 2025-03-10
Attending: INTERNAL MEDICINE
Payer: MEDICARE

## 2025-03-10 VITALS
WEIGHT: 176 LBS | HEIGHT: 67 IN | SYSTOLIC BLOOD PRESSURE: 133 MMHG | RESPIRATION RATE: 18 BRPM | DIASTOLIC BLOOD PRESSURE: 71 MMHG | BODY MASS INDEX: 27.62 KG/M2 | HEART RATE: 68 BPM

## 2025-03-10 DIAGNOSIS — I25.10 ASCVD (ARTERIOSCLEROTIC CARDIOVASCULAR DISEASE): Primary | ICD-10-CM

## 2025-03-10 LAB — BACTERIA WND CULT: NORMAL

## 2025-03-10 PROCEDURE — 63600175 PHARM REV CODE 636 W HCPCS: Mod: JZ,TB | Performed by: INTERNAL MEDICINE

## 2025-03-10 PROCEDURE — 96372 THER/PROPH/DIAG INJ SC/IM: CPT

## 2025-03-10 RX ORDER — ACETAMINOPHEN 325 MG/1
650 TABLET ORAL EVERY 6 HOURS PRN
OUTPATIENT
Start: 2025-06-01

## 2025-03-10 RX ORDER — EPINEPHRINE 0.3 MG/.3ML
0.3 INJECTION SUBCUTANEOUS ONCE AS NEEDED
Status: DISCONTINUED | OUTPATIENT
Start: 2025-03-10 | End: 2025-03-10 | Stop reason: HOSPADM

## 2025-03-10 RX ORDER — METHYLPREDNISOLONE SOD SUCC 125 MG
125 VIAL (EA) INJECTION ONCE
Status: DISCONTINUED | OUTPATIENT
Start: 2025-03-10 | End: 2025-03-10 | Stop reason: HOSPADM

## 2025-03-10 RX ORDER — KETOROLAC TROMETHAMINE 30 MG/ML
15 INJECTION, SOLUTION INTRAMUSCULAR; INTRAVENOUS ONCE AS NEEDED
Status: DISCONTINUED | OUTPATIENT
Start: 2025-03-10 | End: 2025-03-10 | Stop reason: HOSPADM

## 2025-03-10 RX ORDER — KETOROLAC TROMETHAMINE 30 MG/ML
15 INJECTION, SOLUTION INTRAMUSCULAR; INTRAVENOUS ONCE AS NEEDED
OUTPATIENT
Start: 2025-06-01

## 2025-03-10 RX ORDER — ONDANSETRON HYDROCHLORIDE 2 MG/ML
8 INJECTION, SOLUTION INTRAVENOUS ONCE AS NEEDED
OUTPATIENT
Start: 2025-06-01

## 2025-03-10 RX ORDER — DIPHENHYDRAMINE HYDROCHLORIDE 50 MG/ML
50 INJECTION, SOLUTION INTRAMUSCULAR; INTRAVENOUS ONCE AS NEEDED
Status: DISCONTINUED | OUTPATIENT
Start: 2025-03-10 | End: 2025-03-10 | Stop reason: HOSPADM

## 2025-03-10 RX ORDER — DIPHENHYDRAMINE HYDROCHLORIDE 50 MG/ML
50 INJECTION, SOLUTION INTRAMUSCULAR; INTRAVENOUS ONCE AS NEEDED
OUTPATIENT
Start: 2025-06-01

## 2025-03-10 RX ORDER — METHYLPREDNISOLONE SOD SUCC 125 MG
125 VIAL (EA) INJECTION ONCE
OUTPATIENT
Start: 2025-06-01 | End: 2025-06-01

## 2025-03-10 RX ORDER — METHYLPREDNISOLONE SOD SUCC 125 MG
125 VIAL (EA) INJECTION
Status: DISCONTINUED | OUTPATIENT
Start: 2025-03-10 | End: 2025-03-10 | Stop reason: HOSPADM

## 2025-03-10 RX ORDER — METHYLPREDNISOLONE SOD SUCC 125 MG
125 VIAL (EA) INJECTION
OUTPATIENT
Start: 2025-06-01

## 2025-03-10 RX ORDER — ACETAMINOPHEN 325 MG/1
650 TABLET ORAL EVERY 6 HOURS PRN
Status: DISCONTINUED | OUTPATIENT
Start: 2025-03-10 | End: 2025-03-10 | Stop reason: HOSPADM

## 2025-03-10 RX ORDER — EPINEPHRINE 0.3 MG/.3ML
0.3 INJECTION SUBCUTANEOUS ONCE AS NEEDED
OUTPATIENT
Start: 2025-06-01

## 2025-03-10 RX ORDER — ONDANSETRON HYDROCHLORIDE 2 MG/ML
8 INJECTION, SOLUTION INTRAVENOUS ONCE AS NEEDED
Status: DISCONTINUED | OUTPATIENT
Start: 2025-03-10 | End: 2025-03-10 | Stop reason: HOSPADM

## 2025-03-10 RX ADMIN — INCLISIRAN 284 MG: 284 INJECTION, SOLUTION SUBCUTANEOUS at 02:03

## 2025-03-21 DIAGNOSIS — M19.90 OSTEOARTHRITIS, UNSPECIFIED OSTEOARTHRITIS TYPE, UNSPECIFIED SITE: ICD-10-CM

## 2025-03-24 RX ORDER — TRAMADOL HYDROCHLORIDE 50 MG/1
50 TABLET ORAL EVERY 8 HOURS PRN
Qty: 90 TABLET | Refills: 0 | Status: SHIPPED | OUTPATIENT
Start: 2025-03-24

## 2025-04-04 ENCOUNTER — TELEPHONE (OUTPATIENT)
Dept: INTERNAL MEDICINE | Facility: CLINIC | Age: 80
End: 2025-04-04
Payer: MEDICARE

## 2025-04-04 DIAGNOSIS — N40.0 BENIGN PROSTATIC HYPERPLASIA, UNSPECIFIED WHETHER LOWER URINARY TRACT SYMPTOMS PRESENT: ICD-10-CM

## 2025-04-04 RX ORDER — TAMSULOSIN HYDROCHLORIDE 0.4 MG/1
1 CAPSULE ORAL 2 TIMES DAILY
Qty: 120 CAPSULE | Refills: 3 | Status: SHIPPED | OUTPATIENT
Start: 2025-04-04

## 2025-04-17 DIAGNOSIS — E78.5 HYPERLIPIDEMIA, UNSPECIFIED HYPERLIPIDEMIA TYPE: ICD-10-CM

## 2025-04-17 DIAGNOSIS — I10 HYPERTENSION, UNSPECIFIED TYPE: ICD-10-CM

## 2025-04-17 RX ORDER — APIXABAN 5 MG/1
5 TABLET, FILM COATED ORAL 2 TIMES DAILY
Qty: 180 TABLET | Refills: 0 | Status: SHIPPED | OUTPATIENT
Start: 2025-04-17

## 2025-04-23 DIAGNOSIS — M19.90 OSTEOARTHRITIS, UNSPECIFIED OSTEOARTHRITIS TYPE, UNSPECIFIED SITE: ICD-10-CM

## 2025-04-23 RX ORDER — TRAMADOL HYDROCHLORIDE 50 MG/1
50 TABLET ORAL EVERY 8 HOURS PRN
Qty: 90 TABLET | Refills: 0 | Status: SHIPPED | OUTPATIENT
Start: 2025-04-23

## 2025-05-26 DIAGNOSIS — M19.90 OSTEOARTHRITIS, UNSPECIFIED OSTEOARTHRITIS TYPE, UNSPECIFIED SITE: ICD-10-CM

## 2025-05-26 RX ORDER — TRAMADOL HYDROCHLORIDE 50 MG/1
50 TABLET, FILM COATED ORAL EVERY 8 HOURS PRN
Qty: 90 TABLET | Refills: 0 | Status: SHIPPED | OUTPATIENT
Start: 2025-05-26

## 2025-05-26 NOTE — TELEPHONE ENCOUNTER
Copied from CRM #[id number]. Topic: Medications - Medication Question  >> May 26, 2025  8:57 AM Javy wrote:  .Who Called: Deshaun Lang Jr.    Caller is requesting assistance/information from provider's office.    Symptoms (please be specific): N/A   How long has patient had these symptoms:  N/A    List of preferred pharmacies on file (remove unneeded): [facility]/pharmacy #[id number] - [place] - [address]  [address] [address]  Phone: [phone] Fax: [phone]    Preferred Method of Contact: Phone Call    Patient's Preferred Phone Number on File: [phone]     Best Call Back Number, if different:    Additional Information: Pt called to f/u on his refill request for his traMADoL (ULTRAM) 50 mg tablet stated he requested last week. Also stated he ran out of medication Saturday. Pt is requesting medication be sent to above pharmacy.Please advise, thank you.    LOV: 11/15/25  NOV: None scheduled  Last fill: 4/24/25 (30 day supply)

## 2025-05-29 DIAGNOSIS — G47.09 OTHER INSOMNIA: ICD-10-CM

## 2025-05-29 RX ORDER — ESZOPICLONE 2 MG/1
2 TABLET, FILM COATED ORAL NIGHTLY
Qty: 30 TABLET | Refills: 5 | Status: SHIPPED | OUTPATIENT
Start: 2025-05-29

## 2025-06-24 DIAGNOSIS — M19.90 OSTEOARTHRITIS, UNSPECIFIED OSTEOARTHRITIS TYPE, UNSPECIFIED SITE: ICD-10-CM

## 2025-06-24 RX ORDER — TRAMADOL HYDROCHLORIDE 50 MG/1
50 TABLET, FILM COATED ORAL EVERY 8 HOURS PRN
Qty: 90 TABLET | Refills: 0 | Status: SHIPPED | OUTPATIENT
Start: 2025-06-24

## 2025-06-24 NOTE — TELEPHONE ENCOUNTER
Copied from CRM #[id number]. Topic: Medications - Medication Refill  >> Jun 24, 2025  9:35 AM Donna wrote:  Who Called: Deshaun Lang Jr.    Refill or New Rx:Refill  RX Name and Strength:traMADoL (ULTRAM) 50 mg tablet  How is the patient currently taking it? (ex. 1XDay): Take 1 tablet (50 mg total) by mouth every 8 (eight) hours as needed for Pain. - Oral  Is this a 30 day or 90 day RX:90  Local or Mail Order:local  List of preferred pharmacies on file (remove unneeded): [facility] #[id number] - [place], [place] - [address] [[id number]]  Ordering Provider: Lenard Miranda MD      Preferred Method of Contact: Phone Call  Patient's Preferred Phone Number on File: [phone]   Best Call Back Number, if different:  Additional Information:

## 2025-07-10 ENCOUNTER — TELEPHONE (OUTPATIENT)
Dept: INTERNAL MEDICINE | Facility: CLINIC | Age: 80
End: 2025-07-10
Payer: MEDICARE

## 2025-07-10 DIAGNOSIS — E78.5 HYPERLIPIDEMIA, UNSPECIFIED HYPERLIPIDEMIA TYPE: ICD-10-CM

## 2025-07-10 DIAGNOSIS — I10 HYPERTENSION, UNSPECIFIED TYPE: ICD-10-CM

## 2025-07-23 DIAGNOSIS — M19.90 OSTEOARTHRITIS, UNSPECIFIED OSTEOARTHRITIS TYPE, UNSPECIFIED SITE: ICD-10-CM

## 2025-07-23 RX ORDER — TRAMADOL HYDROCHLORIDE 50 MG/1
50 TABLET, FILM COATED ORAL EVERY 8 HOURS PRN
Qty: 90 TABLET | Refills: 0 | Status: SHIPPED | OUTPATIENT
Start: 2025-07-23

## 2025-07-23 NOTE — TELEPHONE ENCOUNTER
Copied from CRM #[id number]. Topic: Medications - Medication Refill  >> Jul 23, 2025  9:47 AM Donna wrote:  Who Called: Deshaun Lang Jr.    Refill or New Rx:Refill  RX Name and Strength:traMADoL (ULTRAM) 50 mg tablet  How is the patient currently taking it? (ex. 1XDay):Take 1 tablet (50 mg total) by mouth every 8 (eight) hours as needed for Pain. - Oral  Is this a 30 day or 90 day RX:90  Local or Mail Order:local  List of preferred pharmacies on file (remove unneeded): [facility] - [place], [place] - [address] [place] [address]      Ordering Provider: Lenard Miranda MD      Preferred Method of Contact: Phone Call  Patient's Preferred Phone Number on File: [phone]   Best Call Back Number, if different:  Additional Information:

## 2025-08-18 ENCOUNTER — TELEPHONE (OUTPATIENT)
Dept: INTERNAL MEDICINE | Facility: CLINIC | Age: 80
End: 2025-08-18
Payer: MEDICARE

## 2025-08-18 DIAGNOSIS — E78.2 MIXED HYPERLIPIDEMIA: ICD-10-CM

## 2025-08-18 DIAGNOSIS — E55.9 VITAMIN D DEFICIENCY: ICD-10-CM

## 2025-08-18 DIAGNOSIS — I10 PRIMARY HYPERTENSION: ICD-10-CM

## 2025-08-18 DIAGNOSIS — Z00.00 MEDICARE ANNUAL WELLNESS VISIT, SUBSEQUENT: Primary | ICD-10-CM

## 2025-08-18 DIAGNOSIS — R73.09 ELEVATED GLUCOSE: ICD-10-CM

## 2025-08-21 DIAGNOSIS — M05.20 RHEUMATOID ARTERITIS: ICD-10-CM

## 2025-08-21 DIAGNOSIS — I10 PRIMARY HYPERTENSION: ICD-10-CM

## 2025-08-21 DIAGNOSIS — E78.2 MIXED HYPERLIPIDEMIA: Primary | ICD-10-CM

## 2025-08-21 RX ORDER — TRAMADOL HYDROCHLORIDE 50 MG/1
50 TABLET, FILM COATED ORAL EVERY 12 HOURS PRN
Qty: 90 TABLET | Refills: 0 | Status: SHIPPED | OUTPATIENT
Start: 2025-08-21

## 2025-08-21 RX ORDER — PRAVASTATIN SODIUM 10 MG/1
10 TABLET ORAL
Qty: 90 TABLET | Refills: 3 | Status: SHIPPED | OUTPATIENT
Start: 2025-08-21

## 2025-08-21 RX ORDER — BENAZEPRIL HYDROCHLORIDE 20 MG/1
20 TABLET ORAL
Qty: 90 TABLET | Refills: 3 | Status: SHIPPED | OUTPATIENT
Start: 2025-08-21

## 2025-08-25 ENCOUNTER — TELEPHONE (OUTPATIENT)
Dept: INTERNAL MEDICINE | Facility: CLINIC | Age: 80
End: 2025-08-25
Payer: MEDICARE

## 2025-08-25 DIAGNOSIS — M05.20 RHEUMATOID ARTERITIS: ICD-10-CM

## 2025-08-26 ENCOUNTER — LAB VISIT (OUTPATIENT)
Dept: LAB | Facility: HOSPITAL | Age: 80
End: 2025-08-26
Attending: INTERNAL MEDICINE
Payer: MEDICARE

## 2025-08-26 RX ORDER — TRAMADOL HYDROCHLORIDE 50 MG/1
50 TABLET, FILM COATED ORAL EVERY 8 HOURS PRN
Qty: 90 TABLET | Refills: 0 | Status: SHIPPED | OUTPATIENT
Start: 2025-08-26

## 2025-08-28 ENCOUNTER — OFFICE VISIT (OUTPATIENT)
Dept: INTERNAL MEDICINE | Facility: CLINIC | Age: 80
End: 2025-08-28
Payer: MEDICARE

## 2025-08-28 VITALS
BODY MASS INDEX: 26.06 KG/M2 | WEIGHT: 166 LBS | HEIGHT: 67 IN | RESPIRATION RATE: 16 BRPM | HEART RATE: 96 BPM | TEMPERATURE: 98 F | OXYGEN SATURATION: 98 %

## 2025-08-28 DIAGNOSIS — Z79.899 DRUG-INDUCED IMMUNODEFICIENCY: ICD-10-CM

## 2025-08-28 DIAGNOSIS — R35.1 BENIGN PROSTATIC HYPERPLASIA WITH NOCTURIA: ICD-10-CM

## 2025-08-28 DIAGNOSIS — Z00.00 MEDICARE ANNUAL WELLNESS VISIT, SUBSEQUENT: Primary | ICD-10-CM

## 2025-08-28 DIAGNOSIS — I25.10 ASCVD (ARTERIOSCLEROTIC CARDIOVASCULAR DISEASE): ICD-10-CM

## 2025-08-28 DIAGNOSIS — N40.1 BENIGN PROSTATIC HYPERPLASIA WITH NOCTURIA: ICD-10-CM

## 2025-08-28 DIAGNOSIS — E78.49 OTHER HYPERLIPIDEMIA: ICD-10-CM

## 2025-08-28 DIAGNOSIS — D84.821 DRUG-INDUCED IMMUNODEFICIENCY: ICD-10-CM

## 2025-08-28 DIAGNOSIS — I48.0 PAROXYSMAL ATRIAL FIBRILLATION: ICD-10-CM

## 2025-08-28 DIAGNOSIS — M06.9 RHEUMATOID ARTHRITIS INVOLVING HAND, UNSPECIFIED LATERALITY, UNSPECIFIED WHETHER RHEUMATOID FACTOR PRESENT: ICD-10-CM

## 2025-08-28 DIAGNOSIS — I10 PRIMARY HYPERTENSION: ICD-10-CM

## 2025-08-28 PROBLEM — I21.9 MYOCARDIAL INFARCTION: Status: RESOLVED | Noted: 2024-11-15 | Resolved: 2025-08-28

## 2025-08-28 PROBLEM — J84.9 INTERSTITIAL PULMONARY DISEASE, UNSPECIFIED: Status: RESOLVED | Noted: 2024-02-14 | Resolved: 2025-08-28

## 2025-08-28 PROBLEM — N20.0 NEPHROLITHIASIS: Status: RESOLVED | Noted: 2023-05-25 | Resolved: 2025-08-28

## 2025-08-28 PROBLEM — G31.84 MILD COGNITIVE IMPAIRMENT: Status: RESOLVED | Noted: 2023-03-29 | Resolved: 2025-08-28

## 2025-08-28 PROBLEM — I21.3 ST ELEVATION MYOCARDIAL INFARCTION (STEMI): Status: RESOLVED | Noted: 2024-11-08 | Resolved: 2025-08-28

## 2025-08-28 PROBLEM — Z09 HOSPITAL DISCHARGE FOLLOW-UP: Status: RESOLVED | Noted: 2024-11-15 | Resolved: 2025-08-28

## 2025-08-28 PROBLEM — I70.0 AORTIC ATHEROSCLEROSIS: Status: RESOLVED | Noted: 2024-02-14 | Resolved: 2025-08-28

## 2025-08-28 PROBLEM — M51.379 DEGENERATIVE DISC DISEASE AT L5-S1 LEVEL: Status: RESOLVED | Noted: 2022-07-12 | Resolved: 2025-08-28

## 2025-09-05 DIAGNOSIS — E78.2 MIXED HYPERLIPIDEMIA: ICD-10-CM

## 2025-09-05 RX ORDER — COLESTIPOL HYDROCHLORIDE 1 G/1
TABLET ORAL
Qty: 180 TABLET | Refills: 3 | Status: SHIPPED | OUTPATIENT
Start: 2025-09-05

## (undated) DEVICE — CONTRAST ISOVUE 370 500ML MULT

## (undated) DEVICE — ADHESIVE DERMABOND ADVANCED

## (undated) DEVICE — SHEATH INTRODUCER 5FR 10CM

## (undated) DEVICE — COVER HD BACK TABLE 6FT

## (undated) DEVICE — GLOVE 7.5 PROTEXIS PI MICRO

## (undated) DEVICE — SUT VICRYL PLUS 3-0 SH 18IN

## (undated) DEVICE — ELECTRODE PATIENT RETURN DISP

## (undated) DEVICE — CATH EMERGE MR 20 X 3.00

## (undated) DEVICE — Device

## (undated) DEVICE — SPONGE NEURO 1/4X1/4

## (undated) DEVICE — NDL QUINCKE SPINAL YEL 20GX3IN

## (undated) DEVICE — KIT GLIDESHEATH NIT 6FR 10CM

## (undated) DEVICE — KIT MANIFOLD LOW PRESS TUBING

## (undated) DEVICE — TUBING SILICON CLR 3/16IN 10FT

## (undated) DEVICE — POSITIONER HEAD ADULT

## (undated) DEVICE — GOWN SMARTSLEEVE AAMI LVL4 LG

## (undated) DEVICE — DRAPE OPMI STERILE

## (undated) DEVICE — KIT SURGICAL TURNOVER

## (undated) DEVICE — DRAPE STERI INSTRUMENT 1018

## (undated) DEVICE — ELECTRODE BLADE INSULATED 1 IN

## (undated) DEVICE — SPNG CHERRY DISECT XRAY DTECT

## (undated) DEVICE — POSITIONER HEEL FOAM CONVOLTD

## (undated) DEVICE — DRAPE TOP 53X102IN

## (undated) DEVICE — VALVE CONTROL COPILOT

## (undated) DEVICE — DRAPE ORTH SPLIT 77X108IN

## (undated) DEVICE — CATH JACKY RADIAL 5FR 100CM

## (undated) DEVICE — DEVICE BASIXCOMPAK INFL 20ML

## (undated) DEVICE — GUIDE LAUNCHER 6FR JR 4.0

## (undated) DEVICE — CATH IMPULSE PIGTAIL 5FR 125CM

## (undated) DEVICE — COVER PROBE US 5.5X58L NON LTX

## (undated) DEVICE — CATH IMPULSE FL4 5FR 100CM

## (undated) DEVICE — DRAPE C-ARM COVER EZ 36X28IN

## (undated) DEVICE — COVER LIGHT HANDLE RIGID GRN

## (undated) DEVICE — GAUZE SPONGE 4X4 12PLY

## (undated) DEVICE — SOL NACL IRR 1000ML BTL

## (undated) DEVICE — SOL .9NACL PF 100 ML

## (undated) DEVICE — PAD DEFIB CADENCE ADULT R2

## (undated) DEVICE — TRAY CATH FOL SIL URIMTR 16FR

## (undated) DEVICE — MARKER WRITESITE SKIN CHLRAPRP

## (undated) DEVICE — CATH EAGLE EYE PLATINUM

## (undated) DEVICE — GUIDE LAUNCHER 6FR EBU 3.5

## (undated) DEVICE — GLOVE PROTEXIS BLUE LATEX 7.5

## (undated) DEVICE — CATH OPTITORQUE RADIAL 5FR

## (undated) DEVICE — PACK OR CLEAN UP COMBO SIZE 2

## (undated) DEVICE — CANNULA DUAL CO2/O2 NASAL 7FT

## (undated) DEVICE — KIT HAND CONTROL HIGH PRESSUR

## (undated) DEVICE — SUT STRATAFIX 4-0 30CM PS-2

## (undated) DEVICE — STOPCOCK 3-WAY

## (undated) DEVICE — CATH IMPULSE 5F 100CM FR4

## (undated) DEVICE — GUIDEWIRE INQWIRE SE 3MM JTIP

## (undated) DEVICE — TUBING IRR BIPOLAR CORD 12FT

## (undated) DEVICE — KIT SURGIFLO HEMOSTATIC MATRIX

## (undated) DEVICE — TUBING HP AIRLSS ROT ADPT 30IN

## (undated) DEVICE — GUIDEWIRE OMNI STR TIP 185CM

## (undated) DEVICE — DURAPREP SURG SCRUB 26ML

## (undated) DEVICE — NDL HYPO 22GX1 1/2 SYR 10ML LL

## (undated) DEVICE — GUIDEWIRE RUNTHROUGH EF 180CM

## (undated) DEVICE — BAND TR COMP DEVICE REG 24CM